# Patient Record
Sex: MALE | Race: WHITE | NOT HISPANIC OR LATINO | Employment: OTHER | URBAN - NONMETROPOLITAN AREA
[De-identification: names, ages, dates, MRNs, and addresses within clinical notes are randomized per-mention and may not be internally consistent; named-entity substitution may affect disease eponyms.]

---

## 2019-03-06 ENCOUNTER — LAB (OUTPATIENT)
Dept: LAB | Facility: HOSPITAL | Age: 64
End: 2019-03-06

## 2019-03-06 ENCOUNTER — OFFICE VISIT (OUTPATIENT)
Dept: ORTHOPEDIC SURGERY | Facility: CLINIC | Age: 64
End: 2019-03-06

## 2019-03-06 VITALS — WEIGHT: 293 LBS | HEIGHT: 65 IN | BODY MASS INDEX: 48.82 KG/M2

## 2019-03-06 DIAGNOSIS — Z96.659 PAINFUL TOTAL KNEE REPLACEMENT, INITIAL ENCOUNTER (HCC): ICD-10-CM

## 2019-03-06 DIAGNOSIS — M00.9 PYOGENIC ARTHRITIS OF RIGHT KNEE JOINT, DUE TO UNSPECIFIED ORGANISM (HCC): ICD-10-CM

## 2019-03-06 DIAGNOSIS — M25.561 RIGHT KNEE PAIN, UNSPECIFIED CHRONICITY: Primary | ICD-10-CM

## 2019-03-06 DIAGNOSIS — T84.84XA PAINFUL TOTAL KNEE REPLACEMENT, INITIAL ENCOUNTER (HCC): ICD-10-CM

## 2019-03-06 DIAGNOSIS — M25.561 RIGHT KNEE PAIN, UNSPECIFIED CHRONICITY: ICD-10-CM

## 2019-03-06 LAB
BASOPHILS # BLD AUTO: 0.01 10*3/MM3 (ref 0–0.2)
BASOPHILS NFR BLD AUTO: 0.1 % (ref 0–1.5)
CRP SERPL-MCNC: 5.3 MG/DL (ref 0–1)
DEPRECATED RDW RBC AUTO: 43.7 FL (ref 37–54)
EOSINOPHIL # BLD AUTO: 0.09 10*3/MM3 (ref 0–0.4)
EOSINOPHIL NFR BLD AUTO: 1.2 % (ref 0.3–6.2)
ERYTHROCYTE [DISTWIDTH] IN BLOOD BY AUTOMATED COUNT: 13.7 % (ref 12.3–15.4)
ERYTHROCYTE [SEDIMENTATION RATE] IN BLOOD: 35 MM/HR (ref 0–15)
HCT VFR BLD AUTO: 43.4 % (ref 37.5–51)
HGB BLD-MCNC: 13.9 G/DL (ref 13–17.7)
IMM GRANULOCYTES # BLD AUTO: 0.03 10*3/MM3 (ref 0–0.05)
IMM GRANULOCYTES NFR BLD AUTO: 0.4 % (ref 0–0.5)
LYMPHOCYTES # BLD AUTO: 1.59 10*3/MM3 (ref 0.7–3.1)
LYMPHOCYTES NFR BLD AUTO: 21.8 % (ref 19.6–45.3)
MCH RBC QN AUTO: 27.9 PG (ref 26.6–33)
MCHC RBC AUTO-ENTMCNC: 32 G/DL (ref 31.5–35.7)
MCV RBC AUTO: 87.1 FL (ref 79–97)
MONOCYTES # BLD AUTO: 0.62 10*3/MM3 (ref 0.1–0.9)
MONOCYTES NFR BLD AUTO: 8.5 % (ref 5–12)
NEUTROPHILS # BLD AUTO: 4.97 10*3/MM3 (ref 1.4–7)
NEUTROPHILS NFR BLD AUTO: 68 % (ref 42.7–76)
NRBC BLD AUTO-RTO: 0 /100 WBC (ref 0–0)
PLATELET # BLD AUTO: 175 10*3/MM3 (ref 140–450)
PMV BLD AUTO: 9.7 FL (ref 6–12)
RBC # BLD AUTO: 4.98 10*6/MM3 (ref 4.14–5.8)
WBC NRBC COR # BLD: 7.31 10*3/MM3 (ref 3.4–10.8)

## 2019-03-06 PROCEDURE — 86140 C-REACTIVE PROTEIN: CPT

## 2019-03-06 PROCEDURE — 87205 SMEAR GRAM STAIN: CPT

## 2019-03-06 PROCEDURE — 20610 DRAIN/INJ JOINT/BURSA W/O US: CPT | Performed by: ORTHOPAEDIC SURGERY

## 2019-03-06 PROCEDURE — 36415 COLL VENOUS BLD VENIPUNCTURE: CPT

## 2019-03-06 PROCEDURE — 85025 COMPLETE CBC W/AUTO DIFF WBC: CPT

## 2019-03-06 PROCEDURE — 87070 CULTURE OTHR SPECIMN AEROBIC: CPT

## 2019-03-06 PROCEDURE — 85651 RBC SED RATE NONAUTOMATED: CPT

## 2019-03-06 PROCEDURE — 99203 OFFICE O/P NEW LOW 30 MIN: CPT | Performed by: ORTHOPAEDIC SURGERY

## 2019-03-06 RX ORDER — SULFAMETHOXAZOLE AND TRIMETHOPRIM 800; 160 MG/1; MG/1
1 TABLET ORAL 2 TIMES DAILY
COMMUNITY
End: 2019-06-07

## 2019-03-06 RX ORDER — BUSPIRONE HYDROCHLORIDE 5 MG/1
5 TABLET ORAL EVERY 12 HOURS SCHEDULED
COMMUNITY
End: 2019-11-01

## 2019-03-06 RX ORDER — AMPICILLIN 500 MG/1
500 CAPSULE ORAL 4 TIMES DAILY
COMMUNITY
End: 2019-06-07

## 2019-03-06 RX ORDER — CARVEDILOL 3.12 MG/1
3.12 TABLET ORAL EVERY 12 HOURS SCHEDULED
COMMUNITY
End: 2019-11-01

## 2019-03-06 RX ORDER — DICLOFENAC SODIUM 75 MG/1
TABLET, DELAYED RELEASE ORAL
COMMUNITY
End: 2019-06-07

## 2019-03-06 RX ORDER — CYCLOBENZAPRINE HCL 10 MG
10 TABLET ORAL 3 TIMES DAILY PRN
Status: ON HOLD | COMMUNITY
End: 2019-08-08

## 2019-03-06 RX ORDER — TRAMADOL HYDROCHLORIDE 50 MG/1
TABLET ORAL
COMMUNITY
End: 2019-06-07

## 2019-03-06 NOTE — PROGRESS NOTES
Michael Sorto is a 63 y.o. male   Primary provider:  Rossy Arias APRN       Chief Complaint   Patient presents with   • Right Knee - Pain       HISTORY OF PRESENT ILLNESS: Patient is here today for right knee pain. Patient brought disc with xrays. Patient states that his knee pain is 5/10. Patient has had infection in his right knee for the last 8 months. He is currently taking antibiotics for the infection. Patient is seeing wound care currently for his knee.   His knee was replaced in 2011 on the right side.  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  Shortly after his initial operation 2011 he developed a problem with his knee He tells me and he had an operation done by Dr. Rubin to repair the kneecap.  At that operation he had an infection and he tells me he did not have components removed was on IV antibiotics to the vein for quite some time.  He tells me that after this he did well until July of last year.  At that time he saw Dr. Frey at [facility].  He tells me shortly after that he got into legal issues and was in penitentiary and was unable to have treatment.  He tells me he was placed on steroid while in penitentiary for treatment.  He saw his doctor in [place] that referred him here for treatment is going to be treated closer to home.  He has not had any current treatment but tells me he is on Augmentin at this point.  According to his chart he is on Bactrim.  He is in a wheelchair now he tells me that he has been walking on it does have a fair amount of pain.  He has not had any recent chills or fever    History of Present Illness     CONCURRENT MEDICAL HISTORY:    No past medical history on file.    No Known Allergies      Current Outpatient Medications:   •  ampicillin (PRINCIPEN) 500 MG capsule, Take 500 mg by mouth 4 (Four) Times a Day., Disp: , Rfl:   •  busPIRone (BUSPAR) 5 MG tablet, Every 12 (Twelve) Hours., Disp: , Rfl:   •  carvedilol (COREG) 3.125 MG  tablet, Every 12 (Twelve) Hours., Disp: , Rfl:   •  cyclobenzaprine (FLEXERIL) 10 MG tablet, Take 10 mg by mouth 3 (Three) Times a Day As Needed for Muscle Spasms., Disp: , Rfl:   •  diclofenac (VOLTAREN) 75 MG EC tablet, diclofenac sodium 75 mg tablet,delayed release  TAKE ONE TABLET BY MOUTH TWICE A DAY, Disp: , Rfl:   •  sulfamethoxazole-trimethoprim (BACTRIM DS,SEPTRA DS) 800-160 MG per tablet, Take 1 tablet by mouth 2 (Two) Times a Day., Disp: , Rfl:   •  traMADol (ULTRAM) 50 MG tablet, tramadol 50 mg tablet  TAKE ONE TABLET BY MOUTH EVERY 6 HOURS AS NEEDED, Disp: , Rfl:     No past surgical history on file.    No family history on file.    Social History     Socioeconomic History   • Marital status: Single     Spouse name: Not on file   • Number of children: Not on file   • Years of education: Not on file   • Highest education level: Not on file   Social Needs   • Financial resource strain: Not on file   • Food insecurity - worry: Not on file   • Food insecurity - inability: Not on file   • Transportation needs - medical: Not on file   • Transportation needs - non-medical: Not on file   Occupational History   • Not on file   Tobacco Use   • Smoking status: Never Smoker   Substance and Sexual Activity   • Alcohol use: Not on file   • Drug use: Not on file   • Sexual activity: Not on file   Other Topics Concern   • Not on file   Social History Narrative   • Not on file        Review of Systems   Constitutional: Positive for activity change. Negative for chills and fever.   HENT: Negative for facial swelling.    Eyes: Negative for photophobia.   Respiratory: Negative for apnea and shortness of breath.    Cardiovascular: Negative for chest pain and leg swelling.   Gastrointestinal: Negative for abdominal pain, nausea and vomiting.   Genitourinary: Negative for dysuria.   Musculoskeletal: Positive for arthralgias, gait problem and joint swelling.   Skin: Negative for color change and rash.   Neurological: Positive  "for weakness. Negative for seizures and syncope.   Psychiatric/Behavioral: Negative for behavioral problems and dysphoric mood.         PHYSICAL EXAMINATION:       Ht 165.1 cm (65\")   Wt 133 kg (293 lb)   BMI 48.76 kg/m²     Physical Exam   Constitutional: He is oriented to person, place, and time. He appears well-developed. No distress.   HENT:   Head: Normocephalic and atraumatic.   Eyes: Right eye exhibits no discharge. Left eye exhibits no discharge.   Neck: Neck supple. No tracheal deviation present.   Pulmonary/Chest: Effort normal.   Musculoskeletal: He exhibits edema, tenderness and deformity.   Neurological: He is alert and oriented to person, place, and time.   Skin: Skin is warm and dry. He is not diaphoretic. No erythema.   Psychiatric: He has a normal mood and affect.       GAIT:     []  Normal  []  Antalgic    Assistive device: []  None  []  Walker     []  Crutches  []  Cane     [x]  Wheelchair  []  Stretcher    Ortho Exam  Below-knee amputation on the left.  Marked swelling with 2 mildly draining sinus tracts over a markedly swollen right knee.  The wound is well-healed.  It is warm there appears to be a little lateral subluxation of the tibia on the femur.  There is mild tenderness here.  Cannot not straighten the knee out past about 40 degrees.  We will flex down to about 90 degrees at the most.  Some venous stasis changes over the calf with 2+ edema.  The calf itself is nontender.  It appears neurovascular intact.    I reviewed x-rays he brings with him of the knee that shows a markedly loosened tibial component where the stem is posteriorly displaced with marked anterior slope where it has been moved there is fracture of the anterior tibial cortex as well as posterior perforation of the stem.  There is marked lucency around it likewise there is lucency around the femoral component.  The AP shows marked destruction radiolucency around the femoral component which is shifted on the femur and there " is subluxation laterally of the tibia on the femur with all prosthetic is loose.  There is a very diminutive patella with some fragmentation I do not see definite component here.    No results found.    Large Joint Arthrocentesis: R knee  Date/Time: 3/6/2019 10:45 AM  Consent given by: patient  Site marked: site marked  Timeout: Immediately prior to procedure a time out was called to verify the correct patient, procedure, equipment, support staff and site/side marked as required   Supporting Documentation  Indications: pain   Procedure Details  Location: knee - R knee  Preparation: Patient was prepped and draped in the usual sterile fashion  Needle size: 22 G  Approach: anteromedial  Medication group details: aspiration only no lidocain or kenalog used.  Aspirate amount: 1 mL  Patient tolerance: patient tolerated the procedure well with no immediate complications          ASSESSMENT:    Diagnoses and all orders for this visit:    Right knee pain, unspecified chronicity  -     CBC & Differential; Future  -     Sedimentation Rate; Future  -     C-reactive Protein; Future  -     Large Joint Arthrocentesis: R knee  -     Cancel: Wound Culture - Wound, Knee, Right; Future  -     Wound Culture - Wound, Knee, Right  -     Wound Culture - Wound, Knee, Right; Future    Pyogenic arthritis of right knee joint, due to unspecified organism (CMS/[facility])    Painful total knee replacement, initial encounter (CMS/[facility])    Other orders  -     traMADol (ULTRAM) 50 MG tablet; tramadol 50 mg tablet   TAKE ONE TABLET BY MOUTH EVERY 6 HOURS AS NEEDED  -     carvedilol (COREG) 3.125 MG tablet; Every 12 (Twelve) Hours.  -     diclofenac (VOLTAREN) 75 MG EC tablet; diclofenac sodium 75 mg tablet,delayed release   TAKE ONE TABLET BY MOUTH TWICE A DAY  -     busPIRone (BUSPAR) 5 MG tablet; Every 12 (Twelve) Hours.  -     cyclobenzaprine (FLEXERIL) 10 MG tablet; Take 10 mg by mouth 3 (Three) Times a Day As Needed for Muscle Spasms.  -      ampicillin (PRINCIPEN) 500 MG capsule; Take 500 mg by mouth 4 (Four) Times a Day.  -     sulfamethoxazole-trimethoprim (BACTRIM DS,SEPTRA DS) 800-160 MG per tablet; Take 1 tablet by mouth 2 (Two) Times a Day.          PLAN spent quite a bit of time talking the patient and family member today.  After which I looked on care everywhere and ran through some of the notes at [facility] from actually the end of June 2018.  He did have an aspiration of 10 cc of fluid and according to the notes of that it did not find a culture it grew a Streptococcus and a enterococcus.  Plans were made at that point to try to treat this.  He was told at that point about the potential for above-knee amputation especially with the history of prior extensor mechanism dysfunction.  The patient says he did well after that.  There is also record on here for the patient has had a stroke in the past and hypertensive disorder as well as depressive disorder.  The patient really denies any medical problems chronically with the exception of a heart issue.  He had a valve replaced which I am not sure of the history but this may have been for infection.  There is also records on his previous chart where he had a cardiac event during surgery which is certainly of some concern.  Attempts today an aspiration in 2 different areas with a 1-1/2 inch 18-gauge needle grew very little fluid.  He has 2 sinus tracts.  He is apparently been in wound management which I think will do nothing for that.  Were going to send him for lab work as above and talk about this again.  We will likely stop his antibiotics prior to that and attempt to get a good organism.  I think this is will be a very difficult procedure that is fraught with multiple complications.  I think in terms of the big operation he probably had to be evaluated by cardiology prior to this.  Off of antibiotics with cultures at the time of surgery with hardware removal cement spacers that a knee  immobilizer.  We will also inspect the extensor mechanism at that point.  I think our goals here would be to get his knee straight, eradicate the infection, and get him back to being a household ambulator.  I think will be very difficult for him to get all of his motion back.  There is a very real possibility that he will end up with an above-knee amputation I have explained to them very carefully today.  We will discuss this further with see him back.  I did send a culture off we will go over his studies.  He had a sed rate of54 and a C-reactive protein level at 84 while at [place] last summer.  It is also apparent from reading the report of the x-rays that that is progressed as well over the past 8 months as there is no mention of the pathologic fractures we certainly saw today's films that were done at [facility].  No Follow-up on file.      This document has been electronically signed by Rufino Lu MD on March 6, 2019 12:24 PM

## 2019-03-18 ENCOUNTER — OFFICE VISIT (OUTPATIENT)
Dept: ORTHOPEDIC SURGERY | Facility: CLINIC | Age: 64
End: 2019-03-18

## 2019-03-18 VITALS — HEIGHT: 65 IN | WEIGHT: 293 LBS | BODY MASS INDEX: 48.82 KG/M2

## 2019-03-18 DIAGNOSIS — Z96.659 PAINFUL TOTAL KNEE REPLACEMENT, INITIAL ENCOUNTER (HCC): Primary | ICD-10-CM

## 2019-03-18 DIAGNOSIS — T84.84XA PAINFUL TOTAL KNEE REPLACEMENT, INITIAL ENCOUNTER (HCC): Primary | ICD-10-CM

## 2019-03-18 PROCEDURE — 99213 OFFICE O/P EST LOW 20 MIN: CPT | Performed by: ORTHOPAEDIC SURGERY

## 2019-03-18 NOTE — PROGRESS NOTES
"Michael Sorto is a 63 y.o. male returns for     Chief Complaint   Patient presents with   • Right Knee - Follow-up       HISTORY OF PRESENT ILLNESS: Patient is here today for recheck of right knee. Patient states that he is not currently having any knee pain today.  Does have swelling and tenderness the culture we did 10 days ago really did not grow anything.  We looked at the old records that grew a enterococcus as well as a strep type germ.  And we got another fax from an outlying source that grew a culture likely from his treating the sinus tracts.       CONCURRENT MEDICAL HISTORY:    No past medical history on file.    No Known Allergies      Current Outpatient Medications:   •  ampicillin (PRINCIPEN) 500 MG capsule, Take 500 mg by mouth 4 (Four) Times a Day., Disp: , Rfl:   •  busPIRone (BUSPAR) 5 MG tablet, Every 12 (Twelve) Hours., Disp: , Rfl:   •  carvedilol (COREG) 3.125 MG tablet, Every 12 (Twelve) Hours., Disp: , Rfl:   •  cyclobenzaprine (FLEXERIL) 10 MG tablet, Take 10 mg by mouth 3 (Three) Times a Day As Needed for Muscle Spasms., Disp: , Rfl:   •  diclofenac (VOLTAREN) 75 MG EC tablet, diclofenac sodium 75 mg tablet,delayed release  TAKE ONE TABLET BY MOUTH TWICE A DAY, Disp: , Rfl:   •  sulfamethoxazole-trimethoprim (BACTRIM DS,SEPTRA DS) 800-160 MG per tablet, Take 1 tablet by mouth 2 (Two) Times a Day., Disp: , Rfl:   •  traMADol (ULTRAM) 50 MG tablet, tramadol 50 mg tablet  TAKE ONE TABLET BY MOUTH EVERY 6 HOURS AS NEEDED, Disp: , Rfl:     No past surgical history on file.        ROS: Review of systems has been updated as of today's date.  All other systems are negative except as noted previously.    PHYSICAL EXAMINATION:       Ht 165.1 cm (65\")   Wt 133 kg (293 lb)   BMI 48.76 kg/m²     Physical Exam   Constitutional: He is oriented to person, place, and time. He appears well-developed.   HENT:   Head: Normocephalic and atraumatic.   Eyes: EOM are normal. Pupils are equal, round, and reactive " to light.   Neck: Neck supple. No tracheal deviation present.   Pulmonary/Chest: Effort normal.   Musculoskeletal: Normal range of motion. He exhibits no edema or deformity.   Neurological: He is alert and oriented to person, place, and time.   Skin: Skin is warm and dry. No erythema.   Psychiatric: He has a normal mood and affect.       GAIT:     [x]  Normal  []  Antalgic    Assistive device: []  None  []  Walker     []  Crutches  []  Cane     []  Wheelchair  []  Stretcher    Ortho Exam  Range of motion is unchanged.  The knee is swollen wounds as before.  Neurologically unchanged.    No results found.          ASSESSMENT:    Diagnoses and all orders for this visit:    Painful total knee replacement, initial encounter (CMS/[facility])          PLAN I spent about 15-20 minutes talking with the patient his family regarding his options.  In reading through the notes he did apparently code during surgery.  He says this was because of his valve and that was replaced but has not seen a cardiologist in a while.  He will need to see the cardiology for medical clearance from a Cardiologic standpoint.  Also see Rossy Arias who takes care of his medical issues.  He will need to be cleared by that he has a very large road ahead in terms of options.  I think phase 1 will be removal of the hardware followed by cultures and a cement spacer we will keep him off antibiotics.  He has the issue is extensor mechanism and certainly 1 of the things I discussed with him today just likely did not Zak is above-knee amputation.  Certainly that is an option at this point as well as a potential outcome that may happen regardless of this treatment.  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  Given that the benefit of the doubt we can remove culture put him on IV antibiotics.  This may require nursing home placement.   After that replant certainly may fail and worried about his extensor mechanism.  Certainly agree with the scan as well as other things.  Very real risks and benefits of this operation including but not limited to bleeding, blood clot, amputation, recurrent infection, fracture which she already has, need for further surgery which will be a necessity, medical anesthetic complications, neurovascular injury, etc. this is been discussed very frankly with him he wants to proceed his plan which is very risky operation for a orthopedic perspective which is complicated by his multiple comorbidities  appt with Rossy Arias on 3/25/2019 @ 10:15am and Dr. Nguyễn on 3/28/2019 @ 1:20pm for surgery clearance.       No Follow-up on file.      This document has been electronically signed by Rufino Lu MD on March 18, 2019 10:42 AM

## 2019-03-20 LAB
BACTERIA SPEC AEROBE CULT: NORMAL
GRAM STN SPEC: NORMAL
GRAM STN SPEC: NORMAL

## 2019-03-26 ENCOUNTER — TELEPHONE (OUTPATIENT)
Dept: ORTHOPEDIC SURGERY | Facility: CLINIC | Age: 64
End: 2019-03-26

## 2019-03-26 NOTE — TELEPHONE ENCOUNTER
DR BARBA.  THIS PATIENT HAS AN APPOINTMENT FOR CARDIAC CLEARANCE FOR SURGERY 04/30/19 WITH [facility]..  THEY ARE ASKING IF YOU CAN SEND AN ORDER FOR AN ECHO COMPLETE?  HE HAS HAD AN AORTIC VALVE REPLACEMENT AND THEY NEED TO DO THE ECHO BEFORE THE APPOINTMENT TO CHECK ON THE CLEARANCE.    PHONE # [phone]  FAX # [phone]

## 2019-04-01 ENCOUNTER — TELEPHONE (OUTPATIENT)
Dept: ORTHOPEDIC SURGERY | Facility: CLINIC | Age: 64
End: 2019-04-01

## 2019-04-01 NOTE — TELEPHONE ENCOUNTER
We will call them and let them know that this is not something we order and that they will have to place the order.

## 2019-04-11 ENCOUNTER — OUTSIDE FACILITY SERVICE (OUTPATIENT)
Dept: CARDIOLOGY | Facility: CLINIC | Age: 64
End: 2019-04-11

## 2019-04-11 PROCEDURE — 93306 TTE W/DOPPLER COMPLETE: CPT | Performed by: INTERNAL MEDICINE

## 2019-05-10 ENCOUNTER — TELEPHONE (OUTPATIENT)
Dept: ORTHOPEDIC SURGERY | Facility: CLINIC | Age: 64
End: 2019-05-10

## 2019-05-10 ENCOUNTER — PREP FOR SURGERY (OUTPATIENT)
Dept: OTHER | Facility: HOSPITAL | Age: 64
End: 2019-05-10

## 2019-05-10 DIAGNOSIS — M00.9 PYOGENIC ARTHRITIS OF RIGHT KNEE JOINT, DUE TO UNSPECIFIED ORGANISM (HCC): Primary | ICD-10-CM

## 2019-05-10 RX ORDER — BUPIVACAINE HCL/0.9 % NACL/PF 0.1 %
2 PLASTIC BAG, INJECTION (ML) EPIDURAL ONCE
Status: CANCELLED | OUTPATIENT
Start: 2019-06-18 | End: 2019-05-10

## 2019-05-10 NOTE — TELEPHONE ENCOUNTER
Called patient and let him know once the surgery is approved we will contact him to get it scheduled.

## 2019-05-13 ENCOUNTER — TELEPHONE (OUTPATIENT)
Dept: ORTHOPEDIC SURGERY | Facility: CLINIC | Age: 64
End: 2019-05-13

## 2019-06-07 ENCOUNTER — APPOINTMENT (OUTPATIENT)
Dept: PREADMISSION TESTING | Facility: HOSPITAL | Age: 64
End: 2019-06-07

## 2019-06-07 ENCOUNTER — OFFICE VISIT (OUTPATIENT)
Dept: ORTHOPEDIC SURGERY | Facility: CLINIC | Age: 64
End: 2019-06-07

## 2019-06-07 ENCOUNTER — TELEPHONE (OUTPATIENT)
Dept: ORTHOPEDIC SURGERY | Facility: CLINIC | Age: 64
End: 2019-06-07

## 2019-06-07 VITALS — WEIGHT: 295 LBS | BODY MASS INDEX: 49.15 KG/M2 | HEIGHT: 65 IN

## 2019-06-07 VITALS
WEIGHT: 295 LBS | DIASTOLIC BLOOD PRESSURE: 99 MMHG | HEART RATE: 73 BPM | SYSTOLIC BLOOD PRESSURE: 170 MMHG | OXYGEN SATURATION: 98 % | HEIGHT: 65 IN | BODY MASS INDEX: 49.15 KG/M2 | RESPIRATION RATE: 16 BRPM

## 2019-06-07 DIAGNOSIS — G89.29 CHRONIC PAIN OF RIGHT KNEE: Primary | ICD-10-CM

## 2019-06-07 DIAGNOSIS — M00.9 PYOGENIC ARTHRITIS OF RIGHT KNEE JOINT, DUE TO UNSPECIFIED ORGANISM (HCC): ICD-10-CM

## 2019-06-07 DIAGNOSIS — M25.561 CHRONIC PAIN OF RIGHT KNEE: Primary | ICD-10-CM

## 2019-06-07 DIAGNOSIS — Z96.659 PAINFUL TOTAL KNEE REPLACEMENT, INITIAL ENCOUNTER (HCC): ICD-10-CM

## 2019-06-07 DIAGNOSIS — T84.84XA PAINFUL TOTAL KNEE REPLACEMENT, INITIAL ENCOUNTER (HCC): ICD-10-CM

## 2019-06-07 LAB
ABO GROUP BLD: NORMAL
BLD GP AB SCN SERPL QL: NEGATIVE
DEPRECATED RDW RBC AUTO: 41.6 FL (ref 37–54)
ERYTHROCYTE [DISTWIDTH] IN BLOOD BY AUTOMATED COUNT: 13.2 % (ref 12.3–15.4)
HCT VFR BLD AUTO: 44.1 % (ref 37.5–51)
HGB BLD-MCNC: 14.3 G/DL (ref 13–17.7)
Lab: NORMAL
MCH RBC QN AUTO: 28.1 PG (ref 26.6–33)
MCHC RBC AUTO-ENTMCNC: 32.4 G/DL (ref 31.5–35.7)
MCV RBC AUTO: 86.8 FL (ref 79–97)
PLATELET # BLD AUTO: 143 10*3/MM3 (ref 140–450)
PMV BLD AUTO: 9.5 FL (ref 6–12)
RBC # BLD AUTO: 5.08 10*6/MM3 (ref 4.14–5.8)
RH BLD: POSITIVE
T&S EXPIRATION DATE: NORMAL
WBC NRBC COR # BLD: 6.78 10*3/MM3 (ref 3.4–10.8)

## 2019-06-07 PROCEDURE — 86850 RBC ANTIBODY SCREEN: CPT | Performed by: ANESTHESIOLOGY

## 2019-06-07 PROCEDURE — 36415 COLL VENOUS BLD VENIPUNCTURE: CPT

## 2019-06-07 PROCEDURE — 85027 COMPLETE CBC AUTOMATED: CPT | Performed by: ANESTHESIOLOGY

## 2019-06-07 PROCEDURE — 86901 BLOOD TYPING SEROLOGIC RH(D): CPT | Performed by: ANESTHESIOLOGY

## 2019-06-07 PROCEDURE — 93010 ELECTROCARDIOGRAM REPORT: CPT | Performed by: INTERNAL MEDICINE

## 2019-06-07 PROCEDURE — 86900 BLOOD TYPING SEROLOGIC ABO: CPT | Performed by: ANESTHESIOLOGY

## 2019-06-07 PROCEDURE — 99214 OFFICE O/P EST MOD 30 MIN: CPT | Performed by: NURSE PRACTITIONER

## 2019-06-07 PROCEDURE — 93005 ELECTROCARDIOGRAM TRACING: CPT

## 2019-06-07 RX ORDER — TRAMADOL HYDROCHLORIDE 50 MG/1
50 TABLET ORAL EVERY 6 HOURS PRN
COMMUNITY

## 2019-06-07 RX ORDER — SODIUM CHLORIDE, SODIUM GLUCONATE, SODIUM ACETATE, POTASSIUM CHLORIDE, AND MAGNESIUM CHLORIDE 526; 502; 368; 37; 30 MG/100ML; MG/100ML; MG/100ML; MG/100ML; MG/100ML
1000 INJECTION, SOLUTION INTRAVENOUS CONTINUOUS
Status: CANCELLED | OUTPATIENT
Start: 2019-06-18

## 2019-06-07 RX ORDER — DICLOFENAC SODIUM 75 MG/1
75 TABLET, DELAYED RELEASE ORAL 2 TIMES DAILY
COMMUNITY
End: 2019-08-10 | Stop reason: HOSPADM

## 2019-06-07 NOTE — PROGRESS NOTES
"Michael Sorto is a 64 y.o. male returns for     Chief Complaint   Patient presents with   • Right Knee - Pre-op Exam       HISTORY OF PRESENT ILLNESS: Patient presents to office for follow-up of chronic right knee pain and preoperative exam.  Patient is scheduled for removal of his right knee implant with insertion of spacer and antibiotic cement per Dr. Lu on 6/18/2019.  Since last office visit, patient has been evaluated by his cardiologist, Dr. Danielle Nguyễn, with [facility] on 4/30/2019 and cleared for surgery.  Patient also had a new echocardiogram performed at that time.  Patient is wearing his brace to the right knee for added support.  Patient has a history of left BKA and is wearing his left leg prosthesis.  Patient states his mobility has been quite limited in the past year and he does not walk much due to the pain in his right knee.  Patient is ready to proceed with surgical treatment as planned and previously discussed in detail with Dr. Lu in an effort to improve his chronic right knee pain, mobility and overall quality of life.     CONCURRENT MEDICAL HISTORY:    The following portions of the patient's history were reviewed and updated as appropriate: allergies, current medications, past family history, past medical history, past social history, past surgical history and problem list.     ROS  No fevers or chills.  No chest pain or shortness of air.  No GI or  disturbances. Right knee pain.     PHYSICAL EXAMINATION:       Ht 165.1 cm (65\")   Wt 134 kg (295 lb)   BMI 49.09 kg/m²     Physical Exam   Constitutional: He is oriented to person, place, and time. Vital signs are normal. He appears well-developed and well-nourished. He is cooperative. He does not appear ill. No distress.   HENT:   Head: Normocephalic.   Cardiovascular: Regular rhythm and normal heart sounds.   Pulmonary/Chest: Effort normal and breath sounds normal. No respiratory distress.   Abdominal: Soft. " He exhibits no distension.   Musculoskeletal: He exhibits edema (Right knee), tenderness (Right knee) and deformity (Left BKA (chronic)).        Right knee: He exhibits effusion.   Neurological: He is alert and oriented to person, place, and time. GCS eye subscore is 4. GCS verbal subscore is 5. GCS motor subscore is 6.   Skin: Skin is warm, dry and intact. Capillary refill takes less than 2 seconds. No erythema.   Psychiatric: He has a normal mood and affect. His speech is normal and behavior is normal. Judgment and thought content normal. Cognition and memory are normal.   Vitals reviewed.      GAIT:     []  Normal  []  Antalgic    Assistive device: []  None  []  Walker     []  Crutches  []  Cane     [x]  Wheelchair  []  Stretcher    Right Knee Exam     Tenderness   Right knee tenderness location: Diffuse.    Range of Motion   Extension: 20   Flexion: 90     Other   Erythema: absent  Scars: present (well-healed surgical scars to anterior knee)  Sensation: normal  Pulse: present  Swelling: moderate  Effusion: effusion present    Comments:  Pain and significant limitations with range of motion.  Very limited extension of the knee joint.  Patient is able to flex the knee to about 90 degrees.  Patient has some well-healed surgical scars overlying the anterior knee.  There are 2 chronic wounds noted that appear to be sinus tracts.  No active drainage at this time.  No purulence.  No surrounding erythema.  No signs of cellulitis.  Patient states the wound is drain intermittently.  Moderate swelling/effusion present.  Moderate, +2 edema noted in the lower leg, which appears to be chronic.  There are chronic vascular changes noted in the lower leg also.  Calf is soft and nontender.      Left Knee Exam     Other   Erythema: absent    Comments:  Left BKA with prosthesis in place.               ASSESSMENT:    Diagnoses and all orders for this visit:    Chronic pain of right knee    Painful total knee replacement, initial  encounter (CMS/[facility])    Pyogenic arthritis of right knee joint, due to unspecified organism (CMS/[facility])    PLAN    The patient is here today for updated history and physical and preoperative examination for his upcoming knee surgery with Dr. Lu.  He has no new complaints or concerns noted.  He denies any change in his medical conditions since last office visit.  No recent illness.  Patient has been evaluated by his cardiologist and had a new echocardiogram since his last office visit. He has been cleared for surgery.  Copies of the the cardiology note and echo report are scanned into the chart.  The patient is high risk for surgery and decision making is more complex due to his multiple comorbid medical conditions including previous aortic valve replacement done in 2011, hypertension and previous CVA.  The patient also has an ankle monitor in place to the right lower extremity (ankle) as he is on house arrest.  He is instructed that he will need this moved to a different location on his body prior to his surgery.  Patient states he plans to call and have this taken care of.  Patient is also instructed to stop his Voltaren 5 days prior to surgery.  Patient verbalized understanding.    The patient voiced understanding of the risks, benefits, and alternative forms of treatment that were discussed and the patient consents to proceed with surgery.  All risks, benefits and alternatives were discussed.  Risks including but not exclusive to anesthetic complications, including death, MI, CVA, infection, bleeding DVT, fracture, residual pain and need for future surgery.    This discussion was previously held with the patient by Rufino Lu MD and all questions were answered.  The patient expresses no new questions or concerns today and is ready to proceed.    Scheduled for removal of right knee implant with insertion of spacer and antibiotic cement per Dr. Lu on 6/18/2019.    Return for follow up  postoperatively.        This document has been electronically signed by BRAN Lu on June 11, 2019 12:50 PM      BRAN Lu

## 2019-06-07 NOTE — DISCHARGE INSTRUCTIONS
[facility]  Pre-op Information and Guidelines    You will be called after 2 p.m. the day before your surgery (Friday for Monday surgery) and notified of your time for arrival and approximate surgery time.  If you have not received a call by 4P.M., please contact Same Day Surgery at [phone] of if outside [facility] call [phone].    Please Follow these Important Safety Guidelines:    • The morning of your procedure, take only the medications listed below with   A sip of water:_____________________________________________       __BUSPAR, CARVEDILOL, TRAMADOL, ROLAID___    • DO NOT eat or drink anything after 12:00 midnight the night before surgery  Specific instructions concerning drinking clear liquids will be discussed during  the pre-surgery instruction call the day before your surgery.    • If you take a blood thinner (ex. Plavix, Coumadin, aspirin), ask your doctor when to stop it before surgery  STOP DATE: _________________    • Only 2 visitors are allowed in patient rooms at a time  Your visitors will be asked to wait in the lobby until the admission process is complete with the exception of a parent with a child and patients in need of special assistance.    • YOU CANNOT DRIVE YOURSELF HOME  You must be accompanied by someone who will be responsible for driving you home after surgery and for your care at home.    • DO NOT chew gum, use breath mints, hard candy, or smoke the day of surgery  • DO NOT drink alcohol for at least 24 hours before your surgery  • DO NOT wear any jewelry and remove all body piercing before coming to the hospital  • DO NOT wear make-up to the hospital  • If you are having surgery on an extremity (arm/leg/foot) remove nail polish/artificial nails on the surgical side  • Clothing, glasses, contacts, dentures, and hairpieces must be removed before surgery  • Bathe the night before or the morning of your surgery and do not use powders/lotions on  skin.

## 2019-06-07 NOTE — TELEPHONE ENCOUNTER
Need to speak with patient in regards to surgery and his ankle monitor that needs to be removed before surgery.

## 2019-06-17 ENCOUNTER — ANESTHESIA EVENT (OUTPATIENT)
Dept: PERIOP | Facility: HOSPITAL | Age: 64
End: 2019-06-17

## 2019-06-17 DIAGNOSIS — M25.561 RIGHT KNEE PAIN, UNSPECIFIED CHRONICITY: ICD-10-CM

## 2019-06-17 DIAGNOSIS — Z96.659 PAINFUL TOTAL KNEE REPLACEMENT, INITIAL ENCOUNTER (HCC): ICD-10-CM

## 2019-06-17 DIAGNOSIS — T84.84XA PAINFUL TOTAL KNEE REPLACEMENT, INITIAL ENCOUNTER (HCC): ICD-10-CM

## 2019-06-17 DIAGNOSIS — M25.561 CHRONIC PAIN OF RIGHT KNEE: Primary | ICD-10-CM

## 2019-06-17 DIAGNOSIS — G89.29 CHRONIC PAIN OF RIGHT KNEE: Primary | ICD-10-CM

## 2019-06-17 DIAGNOSIS — M00.9 PYOGENIC ARTHRITIS OF RIGHT KNEE JOINT, DUE TO UNSPECIFIED ORGANISM (HCC): ICD-10-CM

## 2019-06-18 ENCOUNTER — HOSPITAL ENCOUNTER (INPATIENT)
Facility: HOSPITAL | Age: 64
LOS: 10 days | Discharge: NURSING, REHAB OR LONG-TERM CARE | End: 2019-06-28
Attending: ORTHOPAEDIC SURGERY | Admitting: ORTHOPAEDIC SURGERY

## 2019-06-18 ENCOUNTER — APPOINTMENT (OUTPATIENT)
Dept: GENERAL RADIOLOGY | Facility: HOSPITAL | Age: 64
End: 2019-06-18

## 2019-06-18 ENCOUNTER — ANESTHESIA (OUTPATIENT)
Dept: PERIOP | Facility: HOSPITAL | Age: 64
End: 2019-06-18

## 2019-06-18 DIAGNOSIS — Z78.9 IMPAIRED MOBILITY AND ADLS: ICD-10-CM

## 2019-06-18 DIAGNOSIS — M00.9 PYOGENIC ARTHRITIS OF RIGHT KNEE JOINT, DUE TO UNSPECIFIED ORGANISM (HCC): ICD-10-CM

## 2019-06-18 DIAGNOSIS — Z74.09 IMPAIRED PHYSICAL MOBILITY: ICD-10-CM

## 2019-06-18 DIAGNOSIS — Z74.09 IMPAIRED MOBILITY AND ADLS: ICD-10-CM

## 2019-06-18 PROCEDURE — 87186 SC STD MICRODIL/AGAR DIL: CPT | Performed by: ORTHOPAEDIC SURGERY

## 2019-06-18 PROCEDURE — 25010000002 PROPOFOL 10 MG/ML EMULSION: Performed by: NURSE ANESTHETIST, CERTIFIED REGISTERED

## 2019-06-18 PROCEDURE — 88305 TISSUE EXAM BY PATHOLOGIST: CPT | Performed by: PATHOLOGY

## 2019-06-18 PROCEDURE — 25010000002 ONDANSETRON PER 1 MG: Performed by: NURSE ANESTHETIST, CERTIFIED REGISTERED

## 2019-06-18 PROCEDURE — 88304 TISSUE EXAM BY PATHOLOGIST: CPT | Performed by: ORTHOPAEDIC SURGERY

## 2019-06-18 PROCEDURE — 88311 DECALCIFY TISSUE: CPT | Performed by: ORTHOPAEDIC SURGERY

## 2019-06-18 PROCEDURE — 87077 CULTURE AEROBIC IDENTIFY: CPT | Performed by: ORTHOPAEDIC SURGERY

## 2019-06-18 PROCEDURE — 0SRC0EZ REPLACEMENT OF RIGHT KNEE JOINT WITH ARTICULATING SPACER, OPEN APPROACH: ICD-10-PCS | Performed by: ORTHOPAEDIC SURGERY

## 2019-06-18 PROCEDURE — 73560 X-RAY EXAM OF KNEE 1 OR 2: CPT

## 2019-06-18 PROCEDURE — 25010000002 VANCOMYCIN 1 G RECONSTITUTED SOLUTION: Performed by: ORTHOPAEDIC SURGERY

## 2019-06-18 PROCEDURE — A9270 NON-COVERED ITEM OR SERVICE: HCPCS | Performed by: ORTHOPAEDIC SURGERY

## 2019-06-18 PROCEDURE — 25010000002 FENTANYL CITRATE (PF) 100 MCG/2ML SOLUTION: Performed by: NURSE ANESTHETIST, CERTIFIED REGISTERED

## 2019-06-18 PROCEDURE — 88300 SURGICAL PATH GROSS: CPT | Performed by: ORTHOPAEDIC SURGERY

## 2019-06-18 PROCEDURE — 87070 CULTURE OTHR SPECIMN AEROBIC: CPT | Performed by: ORTHOPAEDIC SURGERY

## 2019-06-18 PROCEDURE — 63710000001 OXYCODONE 10 MG TABLET EXTENDED-RELEASE 12 HOUR: Performed by: ORTHOPAEDIC SURGERY

## 2019-06-18 PROCEDURE — 88305 TISSUE EXAM BY PATHOLOGIST: CPT | Performed by: ORTHOPAEDIC SURGERY

## 2019-06-18 PROCEDURE — 87176 TISSUE HOMOGENIZATION CULTR: CPT | Performed by: ORTHOPAEDIC SURGERY

## 2019-06-18 PROCEDURE — 25010000002 CEFAZOLIN PER 500 MG: Performed by: ORTHOPAEDIC SURGERY

## 2019-06-18 PROCEDURE — 63710000001 SENNA-DOCUSATE 8.6-50 MG TABLET: Performed by: ORTHOPAEDIC SURGERY

## 2019-06-18 PROCEDURE — 94760 N-INVAS EAR/PLS OXIMETRY 1: CPT

## 2019-06-18 PROCEDURE — 87205 SMEAR GRAM STAIN: CPT | Performed by: ORTHOPAEDIC SURGERY

## 2019-06-18 PROCEDURE — 63710000001 BUSPIRONE 5 MG TABLET: Performed by: ORTHOPAEDIC SURGERY

## 2019-06-18 PROCEDURE — 25010000002 SUCCINYLCHOLINE PER 20 MG: Performed by: NURSE ANESTHETIST, CERTIFIED REGISTERED

## 2019-06-18 PROCEDURE — 25010000002 HYDROMORPHONE 1 MG/ML SOLUTION: Performed by: NURSE ANESTHETIST, CERTIFIED REGISTERED

## 2019-06-18 PROCEDURE — 25010000002 MIDAZOLAM PER 1 MG: Performed by: NURSE ANESTHETIST, CERTIFIED REGISTERED

## 2019-06-18 PROCEDURE — C1713 ANCHOR/SCREW BN/BN,TIS/BN: HCPCS | Performed by: ORTHOPAEDIC SURGERY

## 2019-06-18 PROCEDURE — 94799 UNLISTED PULMONARY SVC/PX: CPT

## 2019-06-18 PROCEDURE — 25010000002 NEOSTIGMINE 4 MG/4ML SOLUTION PREFILLED SYRINGE: Performed by: NURSE ANESTHETIST, CERTIFIED REGISTERED

## 2019-06-18 PROCEDURE — 25010000002 PHENYLEPHRINE PER 1 ML: Performed by: NURSE ANESTHETIST, CERTIFIED REGISTERED

## 2019-06-18 PROCEDURE — 63710000001 FAMOTIDINE 40 MG TABLET: Performed by: ORTHOPAEDIC SURGERY

## 2019-06-18 PROCEDURE — 0SPC0JZ REMOVAL OF SYNTHETIC SUBSTITUTE FROM RIGHT KNEE JOINT, OPEN APPROACH: ICD-10-PCS | Performed by: ORTHOPAEDIC SURGERY

## 2019-06-18 PROCEDURE — 63710000001 CARVEDILOL 3.125 MG TABLET: Performed by: ORTHOPAEDIC SURGERY

## 2019-06-18 PROCEDURE — C1776 JOINT DEVICE (IMPLANTABLE): HCPCS | Performed by: ORTHOPAEDIC SURGERY

## 2019-06-18 PROCEDURE — 88311 DECALCIFY TISSUE: CPT | Performed by: PATHOLOGY

## 2019-06-18 PROCEDURE — 27488 REMOVAL OF KNEE PROSTHESIS: CPT | Performed by: ORTHOPAEDIC SURGERY

## 2019-06-18 PROCEDURE — 25010000002 DEXAMETHASONE PER 1 MG: Performed by: NURSE ANESTHETIST, CERTIFIED REGISTERED

## 2019-06-18 PROCEDURE — 63710000001 ACETAMINOPHEN 500 MG TABLET: Performed by: ORTHOPAEDIC SURGERY

## 2019-06-18 PROCEDURE — 25010000002 GENTAMICIN PER 80 MG: Performed by: ORTHOPAEDIC SURGERY

## 2019-06-18 DEVICE — MEDIUM 44A/P X 67M/L FEMORAL SPACER MOLD
Type: IMPLANTABLE DEVICE | Site: KNEE | Status: FUNCTIONAL
Brand: KASM

## 2019-06-18 DEVICE — MEDIUM 45A/P X 70M/L TIBIAL SPACER MOLD
Type: IMPLANTABLE DEVICE | Site: KNEE | Status: FUNCTIONAL
Brand: KASM

## 2019-06-18 DEVICE — SMARTSET GMV HIGH PERFORMANCE GENTAMICIN MEDIUM VISCOSITY BONE CEMENT 40G
Type: IMPLANTABLE DEVICE | Site: KNEE | Status: FUNCTIONAL
Brand: SMARTSET

## 2019-06-18 DEVICE — STIMULAN® RAPID CURE PROVIDED STERILE FOR SINGLE PATIENT USE. STIMULAN® RAPID CURE CONTAINS CALCIUM SULFATE POWDER AND MIXING SOLUTION IN PRE-MEASURED QUANTITIES SO THAT WHEN MIXED TOGETHER IN A STERILE MIXING BOWL, THE RESULTANT PASTE IS TO BE DIGITALLY PACKED INTO OPEN BONE VOID/GAP TO SET INSITU OR PLACED INTO THE MOULD PROVIDED, THE MIXTURE SETS TO FORM BEADS. THE BIODEGRADABLE, RADIOPAQUE BEADS ARE RESORBED IN APPROXIMATELY 30 – 60 DAYS WHEN USED IN ACCORDANCE WITH THE DEVICE LABELLING. STIMULAN® RAPID CURE IS MANUFACTURED FROM SYNTHETIC IMPLANT GRADE CALCIUM SULFATE DIHYDRATE(CASO4.2H2O) THAT RESORBS AND IS REPLACED WITH BONE DURING THE HEALING PROCESS. ALSO, AS THE BONE VOID FILLER BEADS ARE BIODEGRADABLE AND BIOCOMPATIBLE, THEY MAY BE USED AT AN INFECTED SITE.
Type: IMPLANTABLE DEVICE | Site: KNEE | Status: FUNCTIONAL
Brand: STIMULAN® RAPID CURE

## 2019-06-18 RX ORDER — 0.9 % SODIUM CHLORIDE 0.9 %
VIAL (ML) INJECTION AS NEEDED
Status: DISCONTINUED | OUTPATIENT
Start: 2019-06-18 | End: 2019-06-28 | Stop reason: HOSPADM

## 2019-06-18 RX ORDER — SODIUM CHLORIDE 9 MG/ML
75 INJECTION, SOLUTION INTRAVENOUS CONTINUOUS
Status: DISCONTINUED | OUTPATIENT
Start: 2019-06-18 | End: 2019-06-28 | Stop reason: HOSPADM

## 2019-06-18 RX ORDER — OXYCODONE HCL 10 MG/1
10 TABLET, FILM COATED, EXTENDED RELEASE ORAL EVERY 12 HOURS SCHEDULED
Status: COMPLETED | OUTPATIENT
Start: 2019-06-18 | End: 2019-06-23

## 2019-06-18 RX ORDER — ONDANSETRON 2 MG/ML
4 INJECTION INTRAMUSCULAR; INTRAVENOUS ONCE AS NEEDED
Status: DISCONTINUED | OUTPATIENT
Start: 2019-06-18 | End: 2019-06-18 | Stop reason: HOSPADM

## 2019-06-18 RX ORDER — BUPIVACAINE HCL/0.9 % NACL/PF 0.1 %
2 PLASTIC BAG, INJECTION (ML) EPIDURAL ONCE
Status: COMPLETED | OUTPATIENT
Start: 2019-06-18 | End: 2019-06-18

## 2019-06-18 RX ORDER — ONDANSETRON 4 MG/1
4 TABLET, FILM COATED ORAL EVERY 6 HOURS PRN
Status: DISCONTINUED | OUTPATIENT
Start: 2019-06-18 | End: 2019-06-28 | Stop reason: HOSPADM

## 2019-06-18 RX ORDER — BUSPIRONE HYDROCHLORIDE 5 MG/1
5 TABLET ORAL EVERY 12 HOURS SCHEDULED
Status: DISCONTINUED | OUTPATIENT
Start: 2019-06-18 | End: 2019-06-28 | Stop reason: HOSPADM

## 2019-06-18 RX ORDER — SODIUM CHLORIDE 0.9 % (FLUSH) 0.9 %
3-10 SYRINGE (ML) INJECTION AS NEEDED
Status: DISCONTINUED | OUTPATIENT
Start: 2019-06-18 | End: 2019-06-28 | Stop reason: HOSPADM

## 2019-06-18 RX ORDER — ACETAMINOPHEN 500 MG
1000 TABLET ORAL 4 TIMES DAILY
Status: DISCONTINUED | OUTPATIENT
Start: 2019-06-18 | End: 2019-06-28 | Stop reason: HOSPADM

## 2019-06-18 RX ORDER — PROPOFOL 10 MG/ML
VIAL (ML) INTRAVENOUS AS NEEDED
Status: DISCONTINUED | OUTPATIENT
Start: 2019-06-18 | End: 2019-06-18 | Stop reason: SURG

## 2019-06-18 RX ORDER — PROMETHAZINE HYDROCHLORIDE 25 MG/1
25 TABLET ORAL ONCE AS NEEDED
Status: DISCONTINUED | OUTPATIENT
Start: 2019-06-18 | End: 2019-06-18 | Stop reason: HOSPADM

## 2019-06-18 RX ORDER — SENNA AND DOCUSATE SODIUM 50; 8.6 MG/1; MG/1
2 TABLET, FILM COATED ORAL NIGHTLY
Status: DISCONTINUED | OUTPATIENT
Start: 2019-06-18 | End: 2019-06-28 | Stop reason: HOSPADM

## 2019-06-18 RX ORDER — EPHEDRINE SULFATE 50 MG/ML
INJECTION, SOLUTION INTRAVENOUS AS NEEDED
Status: DISCONTINUED | OUTPATIENT
Start: 2019-06-18 | End: 2019-06-18 | Stop reason: SURG

## 2019-06-18 RX ORDER — PROMETHAZINE HYDROCHLORIDE 25 MG/ML
12.5 INJECTION, SOLUTION INTRAMUSCULAR; INTRAVENOUS ONCE AS NEEDED
Status: DISCONTINUED | OUTPATIENT
Start: 2019-06-18 | End: 2019-06-18 | Stop reason: HOSPADM

## 2019-06-18 RX ORDER — ONDANSETRON 2 MG/ML
INJECTION INTRAMUSCULAR; INTRAVENOUS AS NEEDED
Status: DISCONTINUED | OUTPATIENT
Start: 2019-06-18 | End: 2019-06-18 | Stop reason: SURG

## 2019-06-18 RX ORDER — DEXAMETHASONE SODIUM PHOSPHATE 4 MG/ML
INJECTION, SOLUTION INTRA-ARTICULAR; INTRALESIONAL; INTRAMUSCULAR; INTRAVENOUS; SOFT TISSUE AS NEEDED
Status: DISCONTINUED | OUTPATIENT
Start: 2019-06-18 | End: 2019-06-18 | Stop reason: SURG

## 2019-06-18 RX ORDER — ACETAMINOPHEN 325 MG/1
650 TABLET ORAL ONCE AS NEEDED
Status: DISCONTINUED | OUTPATIENT
Start: 2019-06-18 | End: 2019-06-18 | Stop reason: HOSPADM

## 2019-06-18 RX ORDER — FENTANYL CITRATE 50 UG/ML
INJECTION, SOLUTION INTRAMUSCULAR; INTRAVENOUS AS NEEDED
Status: DISCONTINUED | OUTPATIENT
Start: 2019-06-18 | End: 2019-06-18 | Stop reason: SURG

## 2019-06-18 RX ORDER — NALOXONE HCL 0.4 MG/ML
0.1 VIAL (ML) INJECTION
Status: DISCONTINUED | OUTPATIENT
Start: 2019-06-18 | End: 2019-06-22

## 2019-06-18 RX ORDER — VANCOMYCIN HYDROCHLORIDE 1 G/20ML
INJECTION, POWDER, LYOPHILIZED, FOR SOLUTION INTRAVENOUS AS NEEDED
Status: DISCONTINUED | OUTPATIENT
Start: 2019-06-18 | End: 2019-06-28 | Stop reason: HOSPADM

## 2019-06-18 RX ORDER — ROCURONIUM BROMIDE 10 MG/ML
INJECTION, SOLUTION INTRAVENOUS AS NEEDED
Status: DISCONTINUED | OUTPATIENT
Start: 2019-06-18 | End: 2019-06-18 | Stop reason: SURG

## 2019-06-18 RX ORDER — BUPIVACAINE HCL/0.9 % NACL/PF 0.1 %
2 PLASTIC BAG, INJECTION (ML) EPIDURAL EVERY 8 HOURS
Status: COMPLETED | OUTPATIENT
Start: 2019-06-18 | End: 2019-06-19

## 2019-06-18 RX ORDER — SODIUM CHLORIDE, SODIUM GLUCONATE, SODIUM ACETATE, POTASSIUM CHLORIDE, AND MAGNESIUM CHLORIDE 526; 502; 368; 37; 30 MG/100ML; MG/100ML; MG/100ML; MG/100ML; MG/100ML
1000 INJECTION, SOLUTION INTRAVENOUS CONTINUOUS
Status: ACTIVE | OUTPATIENT
Start: 2019-06-18 | End: 2019-06-20

## 2019-06-18 RX ORDER — PROMETHAZINE HYDROCHLORIDE 25 MG/1
25 SUPPOSITORY RECTAL ONCE AS NEEDED
Status: DISCONTINUED | OUTPATIENT
Start: 2019-06-18 | End: 2019-06-18 | Stop reason: HOSPADM

## 2019-06-18 RX ORDER — FERROUS SULFATE TAB EC 324 MG (65 MG FE EQUIVALENT) 324 (65 FE) MG
324 TABLET DELAYED RESPONSE ORAL
Status: DISCONTINUED | OUTPATIENT
Start: 2019-06-19 | End: 2019-06-28 | Stop reason: HOSPADM

## 2019-06-18 RX ORDER — KETAMINE HYDROCHLORIDE 100 MG/ML
INJECTION INTRAMUSCULAR; INTRAVENOUS AS NEEDED
Status: DISCONTINUED | OUTPATIENT
Start: 2019-06-18 | End: 2019-06-18 | Stop reason: SURG

## 2019-06-18 RX ORDER — ACETAMINOPHEN 650 MG/1
650 SUPPOSITORY RECTAL ONCE AS NEEDED
Status: DISCONTINUED | OUTPATIENT
Start: 2019-06-18 | End: 2019-06-18 | Stop reason: HOSPADM

## 2019-06-18 RX ORDER — NEOSTIGMINE METHYLSULFATE 4 MG/4 ML
SYRINGE (ML) INTRAVENOUS AS NEEDED
Status: DISCONTINUED | OUTPATIENT
Start: 2019-06-18 | End: 2019-06-18 | Stop reason: SURG

## 2019-06-18 RX ORDER — MIDAZOLAM HYDROCHLORIDE 1 MG/ML
INJECTION INTRAMUSCULAR; INTRAVENOUS AS NEEDED
Status: DISCONTINUED | OUTPATIENT
Start: 2019-06-18 | End: 2019-06-18 | Stop reason: SURG

## 2019-06-18 RX ORDER — ONDANSETRON 2 MG/ML
4 INJECTION INTRAMUSCULAR; INTRAVENOUS EVERY 6 HOURS PRN
Status: DISCONTINUED | OUTPATIENT
Start: 2019-06-18 | End: 2019-06-28 | Stop reason: HOSPADM

## 2019-06-18 RX ORDER — SUCCINYLCHOLINE CHLORIDE 20 MG/ML
INJECTION INTRAMUSCULAR; INTRAVENOUS AS NEEDED
Status: DISCONTINUED | OUTPATIENT
Start: 2019-06-18 | End: 2019-06-18 | Stop reason: SURG

## 2019-06-18 RX ORDER — CARVEDILOL 3.12 MG/1
3.12 TABLET ORAL EVERY 12 HOURS SCHEDULED
Status: DISCONTINUED | OUTPATIENT
Start: 2019-06-18 | End: 2019-06-28 | Stop reason: HOSPADM

## 2019-06-18 RX ORDER — FAMOTIDINE 40 MG/1
40 TABLET, FILM COATED ORAL DAILY
Status: DISCONTINUED | OUTPATIENT
Start: 2019-06-18 | End: 2019-06-28 | Stop reason: HOSPADM

## 2019-06-18 RX ORDER — DOCUSATE SODIUM 100 MG/1
100 CAPSULE, LIQUID FILLED ORAL 2 TIMES DAILY PRN
Status: DISCONTINUED | OUTPATIENT
Start: 2019-06-18 | End: 2019-06-28 | Stop reason: HOSPADM

## 2019-06-18 RX ORDER — CYCLOBENZAPRINE HCL 10 MG
10 TABLET ORAL 3 TIMES DAILY PRN
Status: DISCONTINUED | OUTPATIENT
Start: 2019-06-18 | End: 2019-06-28 | Stop reason: HOSPADM

## 2019-06-18 RX ORDER — DEXAMETHASONE SODIUM PHOSPHATE 4 MG/ML
8 INJECTION, SOLUTION INTRA-ARTICULAR; INTRALESIONAL; INTRAMUSCULAR; INTRAVENOUS; SOFT TISSUE ONCE AS NEEDED
Status: DISCONTINUED | OUTPATIENT
Start: 2019-06-18 | End: 2019-06-18 | Stop reason: SDUPTHER

## 2019-06-18 RX ORDER — TRAMADOL HYDROCHLORIDE 50 MG/1
50 TABLET ORAL EVERY 6 HOURS PRN
Status: DISCONTINUED | OUTPATIENT
Start: 2019-06-18 | End: 2019-06-18 | Stop reason: SDUPTHER

## 2019-06-18 RX ORDER — BACITRACIN 50000 [IU]/1
INJECTION, POWDER, FOR SOLUTION INTRAMUSCULAR AS NEEDED
Status: DISCONTINUED | OUTPATIENT
Start: 2019-06-18 | End: 2019-06-28 | Stop reason: HOSPADM

## 2019-06-18 RX ORDER — GENTAMICIN SULFATE 40 MG/ML
INJECTION, SOLUTION INTRAMUSCULAR; INTRAVENOUS AS NEEDED
Status: DISCONTINUED | OUTPATIENT
Start: 2019-06-18 | End: 2019-06-28 | Stop reason: HOSPADM

## 2019-06-18 RX ORDER — OXYCODONE HYDROCHLORIDE 5 MG/1
5 TABLET ORAL EVERY 4 HOURS PRN
Status: DISCONTINUED | OUTPATIENT
Start: 2019-06-18 | End: 2019-06-28 | Stop reason: HOSPADM

## 2019-06-18 RX ORDER — FLUMAZENIL 0.1 MG/ML
0.2 INJECTION INTRAVENOUS AS NEEDED
Status: DISCONTINUED | OUTPATIENT
Start: 2019-06-18 | End: 2019-06-18 | Stop reason: HOSPADM

## 2019-06-18 RX ORDER — NALOXONE HCL 0.4 MG/ML
0.4 VIAL (ML) INJECTION AS NEEDED
Status: DISCONTINUED | OUTPATIENT
Start: 2019-06-18 | End: 2019-06-18 | Stop reason: HOSPADM

## 2019-06-18 RX ORDER — EPHEDRINE SULFATE 50 MG/ML
5 INJECTION, SOLUTION INTRAVENOUS ONCE AS NEEDED
Status: DISCONTINUED | OUTPATIENT
Start: 2019-06-18 | End: 2019-06-18 | Stop reason: HOSPADM

## 2019-06-18 RX ORDER — SODIUM CHLORIDE 0.9 % (FLUSH) 0.9 %
3 SYRINGE (ML) INJECTION EVERY 12 HOURS SCHEDULED
Status: DISCONTINUED | OUTPATIENT
Start: 2019-06-18 | End: 2019-06-28 | Stop reason: HOSPADM

## 2019-06-18 RX ORDER — DIPHENHYDRAMINE HYDROCHLORIDE 50 MG/ML
12.5 INJECTION INTRAMUSCULAR; INTRAVENOUS
Status: DISCONTINUED | OUTPATIENT
Start: 2019-06-18 | End: 2019-06-18 | Stop reason: HOSPADM

## 2019-06-18 RX ORDER — LIDOCAINE HYDROCHLORIDE 20 MG/ML
INJECTION, SOLUTION INFILTRATION; PERINEURAL AS NEEDED
Status: DISCONTINUED | OUTPATIENT
Start: 2019-06-18 | End: 2019-06-18 | Stop reason: SURG

## 2019-06-18 RX ORDER — LABETALOL HYDROCHLORIDE 5 MG/ML
5 INJECTION, SOLUTION INTRAVENOUS
Status: DISCONTINUED | OUTPATIENT
Start: 2019-06-18 | End: 2019-06-18 | Stop reason: HOSPADM

## 2019-06-18 RX ADMIN — PROPOFOL 120 MG: 10 INJECTION, EMULSION INTRAVENOUS at 14:20

## 2019-06-18 RX ADMIN — KETAMINE HYDROCHLORIDE 10 MG: 100 INJECTION INTRAMUSCULAR; INTRAVENOUS at 14:40

## 2019-06-18 RX ADMIN — ROCURONIUM BROMIDE 15 MG: 10 INJECTION INTRAVENOUS at 15:55

## 2019-06-18 RX ADMIN — EPHEDRINE SULFATE 10 MG: 50 INJECTION INTRAVENOUS at 14:49

## 2019-06-18 RX ADMIN — LIDOCAINE HYDROCHLORIDE 100 MG: 20 INJECTION, SOLUTION INFILTRATION; PERINEURAL at 14:20

## 2019-06-18 RX ADMIN — VANCOMYCIN HYDROCHLORIDE 2250 MG: 1 INJECTION, POWDER, LYOPHILIZED, FOR SOLUTION INTRAVENOUS at 22:56

## 2019-06-18 RX ADMIN — CARVEDILOL 3.12 MG: 3.12 TABLET, FILM COATED ORAL at 22:03

## 2019-06-18 RX ADMIN — SODIUM CHLORIDE, SODIUM GLUCONATE, SODIUM ACETATE, POTASSIUM CHLORIDE, AND MAGNESIUM CHLORIDE: 526; 502; 368; 37; 30 INJECTION, SOLUTION INTRAVENOUS at 15:48

## 2019-06-18 RX ADMIN — PHENYLEPHRINE HYDROCHLORIDE 100 MCG: 10 INJECTION INTRAVENOUS at 16:58

## 2019-06-18 RX ADMIN — SENNOSIDES AND DOCUSATE SODIUM 2 TABLET: 8.6; 5 TABLET ORAL at 22:01

## 2019-06-18 RX ADMIN — MIDAZOLAM HYDROCHLORIDE 1 MG: 2 INJECTION, SOLUTION INTRAMUSCULAR; INTRAVENOUS at 14:18

## 2019-06-18 RX ADMIN — PHENYLEPHRINE HYDROCHLORIDE 100 MCG: 10 INJECTION INTRAVENOUS at 16:45

## 2019-06-18 RX ADMIN — BUSPIRONE HYDROCHLORIDE 5 MG: 5 TABLET ORAL at 22:02

## 2019-06-18 RX ADMIN — MIDAZOLAM HYDROCHLORIDE 1 MG: 2 INJECTION, SOLUTION INTRAMUSCULAR; INTRAVENOUS at 14:14

## 2019-06-18 RX ADMIN — FENTANYL CITRATE 50 MCG: 50 INJECTION, SOLUTION INTRAMUSCULAR; INTRAVENOUS at 14:57

## 2019-06-18 RX ADMIN — EPHEDRINE SULFATE 10 MG: 50 INJECTION INTRAVENOUS at 14:46

## 2019-06-18 RX ADMIN — SODIUM CHLORIDE 75 ML/HR: 900 INJECTION INTRAVENOUS at 21:59

## 2019-06-18 RX ADMIN — Medication 3 MG: at 17:03

## 2019-06-18 RX ADMIN — SODIUM CHLORIDE 2 G: 9 INJECTION, SOLUTION INTRAVENOUS at 22:00

## 2019-06-18 RX ADMIN — FENTANYL CITRATE 50 MCG: 50 INJECTION, SOLUTION INTRAMUSCULAR; INTRAVENOUS at 15:26

## 2019-06-18 RX ADMIN — KETAMINE HYDROCHLORIDE 25 MG: 100 INJECTION INTRAMUSCULAR; INTRAVENOUS at 14:20

## 2019-06-18 RX ADMIN — Medication 2 G: at 14:26

## 2019-06-18 RX ADMIN — FENTANYL CITRATE 50 MCG: 50 INJECTION, SOLUTION INTRAMUSCULAR; INTRAVENOUS at 14:20

## 2019-06-18 RX ADMIN — EPHEDRINE SULFATE 10 MG: 50 INJECTION INTRAVENOUS at 14:33

## 2019-06-18 RX ADMIN — SODIUM CHLORIDE, PRESERVATIVE FREE 3 ML: 5 INJECTION INTRAVENOUS at 22:03

## 2019-06-18 RX ADMIN — PHENYLEPHRINE HYDROCHLORIDE 100 MCG: 10 INJECTION INTRAVENOUS at 16:33

## 2019-06-18 RX ADMIN — SODIUM CHLORIDE, SODIUM GLUCONATE, SODIUM ACETATE, POTASSIUM CHLORIDE, AND MAGNESIUM CHLORIDE 1000 ML: 526; 502; 368; 37; 30 INJECTION, SOLUTION INTRAVENOUS at 10:00

## 2019-06-18 RX ADMIN — DEXAMETHASONE SODIUM PHOSPHATE 4 MG: 4 INJECTION, SOLUTION INTRAMUSCULAR; INTRAVENOUS at 14:36

## 2019-06-18 RX ADMIN — PROPOFOL 20 MG: 10 INJECTION, EMULSION INTRAVENOUS at 15:21

## 2019-06-18 RX ADMIN — PHENYLEPHRINE HYDROCHLORIDE 100 MCG: 10 INJECTION INTRAVENOUS at 16:12

## 2019-06-18 RX ADMIN — HYDROMORPHONE HYDROCHLORIDE 0.5 MG: 1 INJECTION, SOLUTION INTRAMUSCULAR; INTRAVENOUS; SUBCUTANEOUS at 18:01

## 2019-06-18 RX ADMIN — OXYCODONE HYDROCHLORIDE 10 MG: 10 TABLET, FILM COATED, EXTENDED RELEASE ORAL at 22:01

## 2019-06-18 RX ADMIN — ONDANSETRON 4 MG: 2 INJECTION INTRAMUSCULAR; INTRAVENOUS at 16:50

## 2019-06-18 RX ADMIN — KETAMINE HYDROCHLORIDE 10 MG: 100 INJECTION INTRAMUSCULAR; INTRAVENOUS at 15:15

## 2019-06-18 RX ADMIN — ROCURONIUM BROMIDE 50 MG: 10 INJECTION INTRAVENOUS at 14:51

## 2019-06-18 RX ADMIN — HYDROMORPHONE HYDROCHLORIDE 0.5 MG: 1 INJECTION, SOLUTION INTRAMUSCULAR; INTRAVENOUS; SUBCUTANEOUS at 17:45

## 2019-06-18 RX ADMIN — SUCCINYLCHOLINE CHLORIDE 200 MG: 20 INJECTION, SOLUTION INTRAMUSCULAR; INTRAVENOUS at 14:20

## 2019-06-18 RX ADMIN — ROCURONIUM BROMIDE 5 MG: 10 INJECTION INTRAVENOUS at 14:20

## 2019-06-18 RX ADMIN — PHENYLEPHRINE HYDROCHLORIDE 100 MCG: 10 INJECTION INTRAVENOUS at 16:43

## 2019-06-18 RX ADMIN — GLYCOPYRROLATE 0.4 MG: 0.2 INJECTION, SOLUTION INTRAMUSCULAR; INTRAVITREAL at 17:03

## 2019-06-18 RX ADMIN — ACETAMINOPHEN 1000 MG: 500 TABLET ORAL at 22:02

## 2019-06-18 RX ADMIN — FAMOTIDINE 40 MG: 40 TABLET ORAL at 22:01

## 2019-06-18 NOTE — ANESTHESIA POSTPROCEDURE EVALUATION
Patient: Michael Sorto    Procedure Summary     Date:  06/18/19 Room / Location:  [facility] OR [address] [place] [address] OR    Anesthesia Start:  1416 Anesthesia Stop:  1731    Procedure:  REMOVAL OF TOTAL KNEE COMPONENTS RIGHT KNEE WITH INSERTION OF CEMENT ANTIBIOTIC SPACER (Right Knee) Diagnosis:       Pyogenic arthritis of right knee joint, due to unspecified organism (CMS/HCC)      (Pyogenic arthritis of right knee joint, due to unspecified organism (CMS/HCC) [M00.9])    Surgeon:  Rufino Lu MD Provider:  Virgil Avalos MD    Anesthesia Type:  general ASA Status:  4          Anesthesia Type: general  Last vitals  BP   172/96 (06/18/19 0949)   Temp   97.5 °F (36.4 °C) (06/18/19 0933)   Pulse   89 (06/18/19 0933)   Resp   18 (06/18/19 0933)     SpO2   96 % (06/18/19 0933)     Post Anesthesia Care and Evaluation    Patient location during evaluation: PACU  Patient participation: complete - patient participated  Level of consciousness: awake and alert  Pain score: 0  Pain management: adequate  Airway patency: patent  Anesthetic complications: No anesthetic complications  PONV Status: none  Cardiovascular status: acceptable and hemodynamically stable  Respiratory status: acceptable, face mask and spontaneous ventilation  Hydration status: acceptable

## 2019-06-18 NOTE — ANESTHESIA PROCEDURE NOTES
Airway  Urgency: elective    Airway not difficult    General Information and Staff    Patient location during procedure: OR  CRNA: Jing Salas CRNA    Indications and Patient Condition  Indications for airway management: airway protection    Preoxygenated: yes  Mask difficulty assessment: 0 - not attempted    Final Airway Details  Final airway type: endotracheal airway      Successful airway: ETT  Cuffed: yes   Successful intubation technique: RSI and video laryngoscopy  Facilitating devices/methods: intubating stylet  Endotracheal tube insertion site: oral  Blade: Thuna  Blade size: 4  ETT size (mm): 7.5  Cormack-Lehane Classification: grade I - full view of glottis  Placement verified by: chest auscultation and capnometry   Cuff volume (mL): 10  Measured from: teeth  ETT to teeth (cm): 21  Number of attempts at approach: 1

## 2019-06-18 NOTE — ANESTHESIA PREPROCEDURE EVALUATION
Anesthesia Evaluation     no history of anesthetic complications:  NPO Solid Status: > 8 hours  NPO Liquid Status: > 2 hours           Airway   Mallampati: III  TM distance: >3 FB  Neck ROM: limited  Difficult intubation highly probable  Dental    (+) poor dentition        Pulmonary     breath sounds clear to auscultation  (-) COPD, asthma, sleep apnea, not a smoker    ROS comment: Seasonal allergies  Cardiovascular   Exercise tolerance: poor (<4 METS)    ECG reviewed  Patient on routine beta blocker and Beta blocker given within 24 hours of surgery  Rhythm: regular  Rate: normal    (+) hypertension poorly controlled, valvular problems/murmurs (Bovine Aortic valve),   (-) dysrhythmias, angina, cardiac stents, DVT, hyperlipidemia    ROS comment:   Normal sinus rhythm  Left axis deviation  Right bundle branch block  Abnormal ECG  No previous ECGs available  [facility]      Neuro/Psych  (+) CVA, psychiatric history Anxiety and Depression,     (-) seizures, TIA, headaches  GI/Hepatic/Renal/Endo    (+) morbid obesity, GERD well controlled,  renal disease (hx of stones) stones,   (-) hepatitis, liver disease, diabetes, hypothyroidism    Musculoskeletal     (+) arthralgias,   Abdominal   (+) obese,    Substance History - negative use  (-) alcohol use, drug use     OB/GYN          Other   (+) arthritis     (-) history of cancer      Phys Exam Other: House arrest band on left arm                Anesthesia Plan    ASA 4     general   ([facility] in room)  intravenous induction   Anesthetic plan, all risks, benefits, and alternatives have been provided, discussed and informed consent has been obtained with: patient.

## 2019-06-19 ENCOUNTER — APPOINTMENT (OUTPATIENT)
Dept: INTERVENTIONAL RADIOLOGY/VASCULAR | Facility: HOSPITAL | Age: 64
End: 2019-06-19

## 2019-06-19 ENCOUNTER — APPOINTMENT (OUTPATIENT)
Dept: GENERAL RADIOLOGY | Facility: HOSPITAL | Age: 64
End: 2019-06-19

## 2019-06-19 ENCOUNTER — APPOINTMENT (OUTPATIENT)
Dept: ULTRASOUND IMAGING | Facility: HOSPITAL | Age: 64
End: 2019-06-19

## 2019-06-19 LAB
ANION GAP SERPL CALCULATED.3IONS-SCNC: 12 MMOL/L
BUN BLD-MCNC: 19 MG/DL (ref 8–23)
BUN/CREAT SERPL: 16.7 (ref 7–25)
CALCIUM SPEC-SCNC: 8.4 MG/DL (ref 8.6–10.5)
CHLORIDE SERPL-SCNC: 101 MMOL/L (ref 98–107)
CO2 SERPL-SCNC: 25 MMOL/L (ref 22–29)
CREAT BLD-MCNC: 1.14 MG/DL (ref 0.76–1.27)
CRP SERPL-MCNC: 3.56 MG/DL (ref 0–0.5)
DEPRECATED RDW RBC AUTO: 43.2 FL (ref 37–54)
ERYTHROCYTE [DISTWIDTH] IN BLOOD BY AUTOMATED COUNT: 13.7 % (ref 12.3–15.4)
ERYTHROCYTE [SEDIMENTATION RATE] IN BLOOD: 28 MM/HR (ref 0–15)
GFR SERPL CREATININE-BSD FRML MDRD: 65 ML/MIN/1.73
GLUCOSE BLD-MCNC: 138 MG/DL (ref 65–99)
HCT VFR BLD AUTO: 38.5 % (ref 37.5–51)
HGB BLD-MCNC: 12.8 G/DL (ref 13–17.7)
MCH RBC QN AUTO: 28.9 PG (ref 26.6–33)
MCHC RBC AUTO-ENTMCNC: 33.2 G/DL (ref 31.5–35.7)
MCV RBC AUTO: 86.9 FL (ref 79–97)
PLATELET # BLD AUTO: 141 10*3/MM3 (ref 140–450)
PMV BLD AUTO: 10.2 FL (ref 6–12)
POTASSIUM BLD-SCNC: 4.6 MMOL/L (ref 3.5–5.2)
RBC # BLD AUTO: 4.43 10*6/MM3 (ref 4.14–5.8)
SODIUM BLD-SCNC: 138 MMOL/L (ref 136–145)
WBC NRBC COR # BLD: 9.72 10*3/MM3 (ref 3.4–10.8)

## 2019-06-19 PROCEDURE — A9270 NON-COVERED ITEM OR SERVICE: HCPCS | Performed by: ORTHOPAEDIC SURGERY

## 2019-06-19 PROCEDURE — 63710000001 OXYCODONE 5 MG TABLET: Performed by: ORTHOPAEDIC SURGERY

## 2019-06-19 PROCEDURE — 86140 C-REACTIVE PROTEIN: CPT | Performed by: ORTHOPAEDIC SURGERY

## 2019-06-19 PROCEDURE — 25010000002 CEFAZOLIN PER 500 MG: Performed by: ORTHOPAEDIC SURGERY

## 2019-06-19 PROCEDURE — 99024 POSTOP FOLLOW-UP VISIT: CPT | Performed by: ORTHOPAEDIC SURGERY

## 2019-06-19 PROCEDURE — 63710000001 FAMOTIDINE 40 MG TABLET: Performed by: ORTHOPAEDIC SURGERY

## 2019-06-19 PROCEDURE — 63710000001 BUSPIRONE 5 MG TABLET: Performed by: ORTHOPAEDIC SURGERY

## 2019-06-19 PROCEDURE — 97166 OT EVAL MOD COMPLEX 45 MIN: CPT

## 2019-06-19 PROCEDURE — 80048 BASIC METABOLIC PNL TOTAL CA: CPT | Performed by: ORTHOPAEDIC SURGERY

## 2019-06-19 PROCEDURE — 85651 RBC SED RATE NONAUTOMATED: CPT | Performed by: ORTHOPAEDIC SURGERY

## 2019-06-19 PROCEDURE — 63710000001 TAMSULOSIN 0.4 MG CAPSULE: Performed by: ORTHOPAEDIC SURGERY

## 2019-06-19 PROCEDURE — 63710000001 OXYCODONE 10 MG TABLET EXTENDED-RELEASE 12 HOUR: Performed by: ORTHOPAEDIC SURGERY

## 2019-06-19 PROCEDURE — C1751 CATH, INF, PER/CENT/MIDLINE: HCPCS

## 2019-06-19 PROCEDURE — 85027 COMPLETE CBC AUTOMATED: CPT | Performed by: ORTHOPAEDIC SURGERY

## 2019-06-19 PROCEDURE — 63710000001 ACETAMINOPHEN 500 MG TABLET: Performed by: ORTHOPAEDIC SURGERY

## 2019-06-19 PROCEDURE — 63710000001 FERROUS SULFATE 324 (65 FE) MG TABLET DELAYED-RELEASE: Performed by: ORTHOPAEDIC SURGERY

## 2019-06-19 PROCEDURE — 63710000001 ASPIRIN EC 325 MG TABLET DELAYED-RELEASE: Performed by: ORTHOPAEDIC SURGERY

## 2019-06-19 PROCEDURE — 63710000001 CARVEDILOL 3.125 MG TABLET: Performed by: ORTHOPAEDIC SURGERY

## 2019-06-19 PROCEDURE — 25010000002 VANCOMYCIN 5 G RECONSTITUTED SOLUTION: Performed by: ORTHOPAEDIC SURGERY

## 2019-06-19 PROCEDURE — 97162 PT EVAL MOD COMPLEX 30 MIN: CPT

## 2019-06-19 RX ORDER — TAMSULOSIN HYDROCHLORIDE 0.4 MG/1
0.4 CAPSULE ORAL DAILY
Status: DISCONTINUED | OUTPATIENT
Start: 2019-06-19 | End: 2019-06-28 | Stop reason: HOSPADM

## 2019-06-19 RX ADMIN — REGULAR STRENGTH 325 MG: 325 TABLET ORAL at 20:11

## 2019-06-19 RX ADMIN — TAMSULOSIN HYDROCHLORIDE 0.4 MG: 0.4 CAPSULE ORAL at 11:42

## 2019-06-19 RX ADMIN — FAMOTIDINE 40 MG: 40 TABLET ORAL at 11:30

## 2019-06-19 RX ADMIN — REGULAR STRENGTH 325 MG: 325 TABLET ORAL at 11:30

## 2019-06-19 RX ADMIN — OXYCODONE HYDROCHLORIDE 10 MG: 10 TABLET, FILM COATED, EXTENDED RELEASE ORAL at 20:11

## 2019-06-19 RX ADMIN — VANCOMYCIN HYDROCHLORIDE 1750 MG: 5 INJECTION, POWDER, LYOPHILIZED, FOR SOLUTION INTRAVENOUS at 23:59

## 2019-06-19 RX ADMIN — ACETAMINOPHEN 1000 MG: 500 TABLET ORAL at 17:45

## 2019-06-19 RX ADMIN — SODIUM CHLORIDE 75 ML/HR: 900 INJECTION INTRAVENOUS at 11:31

## 2019-06-19 RX ADMIN — SODIUM CHLORIDE 2 G: 9 INJECTION, SOLUTION INTRAVENOUS at 05:33

## 2019-06-19 RX ADMIN — OXYCODONE HYDROCHLORIDE 5 MG: 5 TABLET ORAL at 05:39

## 2019-06-19 RX ADMIN — CARVEDILOL 3.12 MG: 3.12 TABLET, FILM COATED ORAL at 20:10

## 2019-06-19 RX ADMIN — BUSPIRONE HYDROCHLORIDE 5 MG: 5 TABLET ORAL at 20:10

## 2019-06-19 RX ADMIN — BUSPIRONE HYDROCHLORIDE 5 MG: 5 TABLET ORAL at 11:30

## 2019-06-19 RX ADMIN — CARVEDILOL 3.12 MG: 3.12 TABLET, FILM COATED ORAL at 11:29

## 2019-06-19 RX ADMIN — FERROUS SULFATE TAB EC 324 MG (65 MG FE EQUIVALENT) 324 MG: 324 (65 FE) TABLET DELAYED RESPONSE at 11:30

## 2019-06-19 RX ADMIN — ACETAMINOPHEN 1000 MG: 500 TABLET ORAL at 20:11

## 2019-06-19 RX ADMIN — ACETAMINOPHEN 1000 MG: 500 TABLET ORAL at 11:29

## 2019-06-19 RX ADMIN — SODIUM CHLORIDE, PRESERVATIVE FREE 3 ML: 5 INJECTION INTRAVENOUS at 20:12

## 2019-06-19 RX ADMIN — SENNOSIDES AND DOCUSATE SODIUM 2 TABLET: 8.6; 5 TABLET ORAL at 20:11

## 2019-06-19 RX ADMIN — OXYCODONE HYDROCHLORIDE 10 MG: 10 TABLET, FILM COATED, EXTENDED RELEASE ORAL at 11:30

## 2019-06-19 RX ADMIN — SODIUM CHLORIDE, PRESERVATIVE FREE 10 ML: 5 INJECTION INTRAVENOUS at 11:33

## 2019-06-19 RX ADMIN — VANCOMYCIN HYDROCHLORIDE 1750 MG: 5 INJECTION, POWDER, LYOPHILIZED, FOR SOLUTION INTRAVENOUS at 11:41

## 2019-06-20 LAB
LAB AP CASE REPORT: NORMAL
PATH REPORT.FINAL DX SPEC: NORMAL
PATH REPORT.GROSS SPEC: NORMAL
VANCOMYCIN PEAK SERPL-MCNC: 40.7 MCG/ML (ref 20–40)
VANCOMYCIN TROUGH SERPL-MCNC: 29.6 MCG/ML (ref 5–20)

## 2019-06-20 PROCEDURE — 80202 ASSAY OF VANCOMYCIN: CPT | Performed by: ORTHOPAEDIC SURGERY

## 2019-06-20 PROCEDURE — 94760 N-INVAS EAR/PLS OXIMETRY 1: CPT

## 2019-06-20 PROCEDURE — 97110 THERAPEUTIC EXERCISES: CPT

## 2019-06-20 PROCEDURE — 25010000002 VANCOMYCIN 5 G RECONSTITUTED SOLUTION: Performed by: ORTHOPAEDIC SURGERY

## 2019-06-20 PROCEDURE — 97530 THERAPEUTIC ACTIVITIES: CPT

## 2019-06-20 RX ADMIN — ACETAMINOPHEN 1000 MG: 500 TABLET ORAL at 17:23

## 2019-06-20 RX ADMIN — CARVEDILOL 3.12 MG: 3.12 TABLET, FILM COATED ORAL at 21:44

## 2019-06-20 RX ADMIN — BUSPIRONE HYDROCHLORIDE 5 MG: 5 TABLET ORAL at 08:50

## 2019-06-20 RX ADMIN — SENNOSIDES AND DOCUSATE SODIUM 2 TABLET: 8.6; 5 TABLET ORAL at 21:44

## 2019-06-20 RX ADMIN — OXYCODONE HYDROCHLORIDE 5 MG: 5 TABLET ORAL at 04:52

## 2019-06-20 RX ADMIN — OXYCODONE HYDROCHLORIDE 10 MG: 10 TABLET, FILM COATED, EXTENDED RELEASE ORAL at 21:43

## 2019-06-20 RX ADMIN — BUSPIRONE HYDROCHLORIDE 5 MG: 5 TABLET ORAL at 21:43

## 2019-06-20 RX ADMIN — OXYCODONE HYDROCHLORIDE 10 MG: 10 TABLET, FILM COATED, EXTENDED RELEASE ORAL at 08:50

## 2019-06-20 RX ADMIN — REGULAR STRENGTH 325 MG: 325 TABLET ORAL at 21:44

## 2019-06-20 RX ADMIN — FERROUS SULFATE TAB EC 324 MG (65 MG FE EQUIVALENT) 324 MG: 324 (65 FE) TABLET DELAYED RESPONSE at 08:50

## 2019-06-20 RX ADMIN — ACETAMINOPHEN 1000 MG: 500 TABLET ORAL at 21:43

## 2019-06-20 RX ADMIN — CARVEDILOL 3.12 MG: 3.12 TABLET, FILM COATED ORAL at 08:50

## 2019-06-20 RX ADMIN — ACETAMINOPHEN 1000 MG: 500 TABLET ORAL at 08:50

## 2019-06-20 RX ADMIN — SODIUM CHLORIDE 75 ML/HR: 900 INJECTION INTRAVENOUS at 17:26

## 2019-06-20 RX ADMIN — TAMSULOSIN HYDROCHLORIDE 0.4 MG: 0.4 CAPSULE ORAL at 08:50

## 2019-06-20 RX ADMIN — REGULAR STRENGTH 325 MG: 325 TABLET ORAL at 08:50

## 2019-06-20 RX ADMIN — FAMOTIDINE 40 MG: 40 TABLET ORAL at 08:50

## 2019-06-20 RX ADMIN — VANCOMYCIN HYDROCHLORIDE 1250 MG: 5 INJECTION, POWDER, LYOPHILIZED, FOR SOLUTION INTRAVENOUS at 21:43

## 2019-06-20 RX ADMIN — ACETAMINOPHEN 1000 MG: 500 TABLET ORAL at 12:48

## 2019-06-21 LAB
BACTERIA SPEC AEROBE CULT: NORMAL
BACTERIA SPEC AEROBE CULT: NORMAL
GRAM STN SPEC: NORMAL

## 2019-06-21 PROCEDURE — 87205 SMEAR GRAM STAIN: CPT | Performed by: ORTHOPAEDIC SURGERY

## 2019-06-21 PROCEDURE — 99024 POSTOP FOLLOW-UP VISIT: CPT | Performed by: ORTHOPAEDIC SURGERY

## 2019-06-21 PROCEDURE — 97535 SELF CARE MNGMENT TRAINING: CPT

## 2019-06-21 PROCEDURE — 97110 THERAPEUTIC EXERCISES: CPT

## 2019-06-21 PROCEDURE — 25010000002 VANCOMYCIN 1 G RECONSTITUTED SOLUTION: Performed by: ORTHOPAEDIC SURGERY

## 2019-06-21 PROCEDURE — 25010000002 VANCOMYCIN 5 G RECONSTITUTED SOLUTION: Performed by: ORTHOPAEDIC SURGERY

## 2019-06-21 PROCEDURE — 87075 CULTR BACTERIA EXCEPT BLOOD: CPT | Performed by: ORTHOPAEDIC SURGERY

## 2019-06-21 RX ADMIN — REGULAR STRENGTH 325 MG: 325 TABLET ORAL at 08:56

## 2019-06-21 RX ADMIN — BUSPIRONE HYDROCHLORIDE 5 MG: 5 TABLET ORAL at 08:55

## 2019-06-21 RX ADMIN — SODIUM CHLORIDE 75 ML/HR: 900 INJECTION INTRAVENOUS at 08:56

## 2019-06-21 RX ADMIN — OXYCODONE HYDROCHLORIDE 10 MG: 10 TABLET, FILM COATED, EXTENDED RELEASE ORAL at 08:55

## 2019-06-21 RX ADMIN — FERROUS SULFATE TAB EC 324 MG (65 MG FE EQUIVALENT) 324 MG: 324 (65 FE) TABLET DELAYED RESPONSE at 08:55

## 2019-06-21 RX ADMIN — REGULAR STRENGTH 325 MG: 325 TABLET ORAL at 21:16

## 2019-06-21 RX ADMIN — ACETAMINOPHEN 1000 MG: 500 TABLET ORAL at 11:23

## 2019-06-21 RX ADMIN — FAMOTIDINE 40 MG: 40 TABLET ORAL at 08:55

## 2019-06-21 RX ADMIN — ACETAMINOPHEN 1000 MG: 500 TABLET ORAL at 08:55

## 2019-06-21 RX ADMIN — VANCOMYCIN HYDROCHLORIDE 1250 MG: 5 INJECTION, POWDER, LYOPHILIZED, FOR SOLUTION INTRAVENOUS at 11:02

## 2019-06-21 RX ADMIN — CARVEDILOL 3.12 MG: 3.12 TABLET, FILM COATED ORAL at 08:55

## 2019-06-21 RX ADMIN — TAMSULOSIN HYDROCHLORIDE 0.4 MG: 0.4 CAPSULE ORAL at 08:55

## 2019-06-21 RX ADMIN — VANCOMYCIN HYDROCHLORIDE 1250 MG: 5 INJECTION, POWDER, LYOPHILIZED, FOR SOLUTION INTRAVENOUS at 23:55

## 2019-06-21 RX ADMIN — SENNOSIDES AND DOCUSATE SODIUM 2 TABLET: 8.6; 5 TABLET ORAL at 20:53

## 2019-06-21 RX ADMIN — ACETAMINOPHEN 1000 MG: 500 TABLET ORAL at 17:40

## 2019-06-21 RX ADMIN — OXYCODONE HYDROCHLORIDE 10 MG: 10 TABLET, FILM COATED, EXTENDED RELEASE ORAL at 20:53

## 2019-06-21 RX ADMIN — CARVEDILOL 3.12 MG: 3.12 TABLET, FILM COATED ORAL at 20:54

## 2019-06-21 RX ADMIN — BUSPIRONE HYDROCHLORIDE 5 MG: 5 TABLET ORAL at 20:54

## 2019-06-22 LAB
ANION GAP SERPL CALCULATED.3IONS-SCNC: 9 MMOL/L
BACTERIA SPEC AEROBE CULT: ABNORMAL
BUN BLD-MCNC: 19 MG/DL (ref 8–23)
BUN/CREAT SERPL: 17.8 (ref 7–25)
CALCIUM SPEC-SCNC: 8.5 MG/DL (ref 8.6–10.5)
CHLORIDE SERPL-SCNC: 108 MMOL/L (ref 98–107)
CO2 SERPL-SCNC: 25 MMOL/L (ref 22–29)
CREAT BLD-MCNC: 1.07 MG/DL (ref 0.76–1.27)
GFR SERPL CREATININE-BSD FRML MDRD: 70 ML/MIN/1.73
GLUCOSE BLD-MCNC: 114 MG/DL (ref 65–99)
GRAM STN SPEC: ABNORMAL
GRAM STN SPEC: ABNORMAL
POTASSIUM BLD-SCNC: 4.7 MMOL/L (ref 3.5–5.2)
SODIUM BLD-SCNC: 142 MMOL/L (ref 136–145)
VANCOMYCIN PEAK SERPL-MCNC: 40 MCG/ML (ref 20–40)
VANCOMYCIN TROUGH SERPL-MCNC: 26.8 MCG/ML (ref 5–20)
WHOLE BLOOD HOLD SPECIMEN: NORMAL

## 2019-06-22 PROCEDURE — 97530 THERAPEUTIC ACTIVITIES: CPT | Performed by: PHYSICAL THERAPIST

## 2019-06-22 PROCEDURE — 80202 ASSAY OF VANCOMYCIN: CPT | Performed by: ORTHOPAEDIC SURGERY

## 2019-06-22 PROCEDURE — 80048 BASIC METABOLIC PNL TOTAL CA: CPT | Performed by: ORTHOPAEDIC SURGERY

## 2019-06-22 PROCEDURE — 97110 THERAPEUTIC EXERCISES: CPT

## 2019-06-22 PROCEDURE — 97110 THERAPEUTIC EXERCISES: CPT | Performed by: PHYSICAL THERAPIST

## 2019-06-22 PROCEDURE — 99024 POSTOP FOLLOW-UP VISIT: CPT | Performed by: ORTHOPAEDIC SURGERY

## 2019-06-22 PROCEDURE — 25010000002 AMPICILLIN PER 500 MG: Performed by: ORTHOPAEDIC SURGERY

## 2019-06-22 RX ADMIN — ACETAMINOPHEN 1000 MG: 500 TABLET ORAL at 21:25

## 2019-06-22 RX ADMIN — TAMSULOSIN HYDROCHLORIDE 0.4 MG: 0.4 CAPSULE ORAL at 09:04

## 2019-06-22 RX ADMIN — SENNOSIDES AND DOCUSATE SODIUM 2 TABLET: 8.6; 5 TABLET ORAL at 21:25

## 2019-06-22 RX ADMIN — OXYCODONE HYDROCHLORIDE 10 MG: 10 TABLET, FILM COATED, EXTENDED RELEASE ORAL at 09:04

## 2019-06-22 RX ADMIN — OXYCODONE HYDROCHLORIDE 10 MG: 10 TABLET, FILM COATED, EXTENDED RELEASE ORAL at 21:25

## 2019-06-22 RX ADMIN — BUSPIRONE HYDROCHLORIDE 5 MG: 5 TABLET ORAL at 21:25

## 2019-06-22 RX ADMIN — ACETAMINOPHEN 1000 MG: 500 TABLET ORAL at 11:09

## 2019-06-22 RX ADMIN — BUSPIRONE HYDROCHLORIDE 5 MG: 5 TABLET ORAL at 09:04

## 2019-06-22 RX ADMIN — CARVEDILOL 3.12 MG: 3.12 TABLET, FILM COATED ORAL at 09:04

## 2019-06-22 RX ADMIN — FAMOTIDINE 40 MG: 40 TABLET ORAL at 09:04

## 2019-06-22 RX ADMIN — FERROUS SULFATE TAB EC 324 MG (65 MG FE EQUIVALENT) 324 MG: 324 (65 FE) TABLET DELAYED RESPONSE at 09:04

## 2019-06-22 RX ADMIN — Medication: at 05:10

## 2019-06-22 RX ADMIN — REGULAR STRENGTH 325 MG: 325 TABLET ORAL at 21:25

## 2019-06-22 RX ADMIN — SODIUM CHLORIDE 75 ML/HR: 900 INJECTION INTRAVENOUS at 14:01

## 2019-06-22 RX ADMIN — REGULAR STRENGTH 325 MG: 325 TABLET ORAL at 09:04

## 2019-06-22 RX ADMIN — ACETAMINOPHEN 1000 MG: 500 TABLET ORAL at 09:04

## 2019-06-22 RX ADMIN — AMPICILLIN SODIUM 2 G: 2 INJECTION, POWDER, FOR SOLUTION INTRAVENOUS at 21:26

## 2019-06-22 RX ADMIN — AMPICILLIN SODIUM 2 G: 2 INJECTION, POWDER, FOR SOLUTION INTRAVENOUS at 11:09

## 2019-06-22 RX ADMIN — AMPICILLIN SODIUM 2 G: 2 INJECTION, POWDER, FOR SOLUTION INTRAVENOUS at 17:37

## 2019-06-22 RX ADMIN — ACETAMINOPHEN 1000 MG: 500 TABLET ORAL at 17:37

## 2019-06-22 RX ADMIN — CARVEDILOL 3.12 MG: 3.12 TABLET, FILM COATED ORAL at 21:26

## 2019-06-23 LAB
BACTERIA SPEC AEROBE CULT: ABNORMAL
GRAM STN SPEC: ABNORMAL
GRAM STN SPEC: ABNORMAL

## 2019-06-23 PROCEDURE — 25010000002 AMPICILLIN PER 500 MG: Performed by: ORTHOPAEDIC SURGERY

## 2019-06-23 PROCEDURE — 97110 THERAPEUTIC EXERCISES: CPT

## 2019-06-23 PROCEDURE — 99024 POSTOP FOLLOW-UP VISIT: CPT | Performed by: ORTHOPAEDIC SURGERY

## 2019-06-23 PROCEDURE — 97530 THERAPEUTIC ACTIVITIES: CPT

## 2019-06-23 RX ADMIN — REGULAR STRENGTH 325 MG: 325 TABLET ORAL at 09:08

## 2019-06-23 RX ADMIN — AMPICILLIN SODIUM 2 G: 2 INJECTION, POWDER, FOR SOLUTION INTRAVENOUS at 04:18

## 2019-06-23 RX ADMIN — ACETAMINOPHEN 1000 MG: 500 TABLET ORAL at 09:08

## 2019-06-23 RX ADMIN — TAMSULOSIN HYDROCHLORIDE 0.4 MG: 0.4 CAPSULE ORAL at 09:08

## 2019-06-23 RX ADMIN — AMPICILLIN SODIUM 2 G: 2 INJECTION, POWDER, FOR SOLUTION INTRAVENOUS at 20:44

## 2019-06-23 RX ADMIN — BUSPIRONE HYDROCHLORIDE 5 MG: 5 TABLET ORAL at 09:08

## 2019-06-23 RX ADMIN — ACETAMINOPHEN 1000 MG: 500 TABLET ORAL at 11:18

## 2019-06-23 RX ADMIN — CYCLOBENZAPRINE HYDROCHLORIDE 10 MG: 10 TABLET, FILM COATED ORAL at 20:44

## 2019-06-23 RX ADMIN — SODIUM CHLORIDE 75 ML/HR: 900 INJECTION INTRAVENOUS at 17:23

## 2019-06-23 RX ADMIN — AMPICILLIN SODIUM 2 G: 2 INJECTION, POWDER, FOR SOLUTION INTRAVENOUS at 17:23

## 2019-06-23 RX ADMIN — FAMOTIDINE 40 MG: 40 TABLET ORAL at 09:08

## 2019-06-23 RX ADMIN — SODIUM CHLORIDE 75 ML/HR: 900 INJECTION INTRAVENOUS at 05:27

## 2019-06-23 RX ADMIN — AMPICILLIN SODIUM 2 G: 2 INJECTION, POWDER, FOR SOLUTION INTRAVENOUS at 11:18

## 2019-06-23 RX ADMIN — CARVEDILOL 3.12 MG: 3.12 TABLET, FILM COATED ORAL at 20:43

## 2019-06-23 RX ADMIN — ACETAMINOPHEN 1000 MG: 500 TABLET ORAL at 18:02

## 2019-06-23 RX ADMIN — OXYCODONE HYDROCHLORIDE 10 MG: 10 TABLET, FILM COATED, EXTENDED RELEASE ORAL at 09:08

## 2019-06-23 RX ADMIN — FERROUS SULFATE TAB EC 324 MG (65 MG FE EQUIVALENT) 324 MG: 324 (65 FE) TABLET DELAYED RESPONSE at 09:08

## 2019-06-23 RX ADMIN — REGULAR STRENGTH 325 MG: 325 TABLET ORAL at 20:44

## 2019-06-23 RX ADMIN — SENNOSIDES AND DOCUSATE SODIUM 2 TABLET: 8.6; 5 TABLET ORAL at 20:43

## 2019-06-23 RX ADMIN — CARVEDILOL 3.12 MG: 3.12 TABLET, FILM COATED ORAL at 09:08

## 2019-06-23 RX ADMIN — SODIUM CHLORIDE, PRESERVATIVE FREE 3 ML: 5 INJECTION INTRAVENOUS at 20:44

## 2019-06-23 RX ADMIN — BUSPIRONE HYDROCHLORIDE 5 MG: 5 TABLET ORAL at 20:43

## 2019-06-24 LAB
BACTERIA SPEC AEROBE CULT: ABNORMAL
BACTERIA SPEC AEROBE CULT: ABNORMAL
GRAM STN SPEC: ABNORMAL
GRAM STN SPEC: ABNORMAL

## 2019-06-24 PROCEDURE — 97110 THERAPEUTIC EXERCISES: CPT

## 2019-06-24 PROCEDURE — 97535 SELF CARE MNGMENT TRAINING: CPT

## 2019-06-24 PROCEDURE — 25010000002 AMPICILLIN PER 500 MG: Performed by: ORTHOPAEDIC SURGERY

## 2019-06-24 PROCEDURE — 97530 THERAPEUTIC ACTIVITIES: CPT

## 2019-06-24 RX ADMIN — CARVEDILOL 3.12 MG: 3.12 TABLET, FILM COATED ORAL at 09:07

## 2019-06-24 RX ADMIN — BUSPIRONE HYDROCHLORIDE 5 MG: 5 TABLET ORAL at 09:07

## 2019-06-24 RX ADMIN — FERROUS SULFATE TAB EC 324 MG (65 MG FE EQUIVALENT) 324 MG: 324 (65 FE) TABLET DELAYED RESPONSE at 09:06

## 2019-06-24 RX ADMIN — TAMSULOSIN HYDROCHLORIDE 0.4 MG: 0.4 CAPSULE ORAL at 09:06

## 2019-06-24 RX ADMIN — SODIUM CHLORIDE 75 ML/HR: 900 INJECTION INTRAVENOUS at 09:06

## 2019-06-24 RX ADMIN — SENNOSIDES AND DOCUSATE SODIUM 2 TABLET: 8.6; 5 TABLET ORAL at 20:34

## 2019-06-24 RX ADMIN — AMPICILLIN SODIUM 2 G: 2 INJECTION, POWDER, FOR SOLUTION INTRAVENOUS at 18:27

## 2019-06-24 RX ADMIN — BUSPIRONE HYDROCHLORIDE 5 MG: 5 TABLET ORAL at 20:35

## 2019-06-24 RX ADMIN — AMPICILLIN SODIUM 2 G: 2 INJECTION, POWDER, FOR SOLUTION INTRAVENOUS at 12:54

## 2019-06-24 RX ADMIN — ACETAMINOPHEN 1000 MG: 500 TABLET ORAL at 18:27

## 2019-06-24 RX ADMIN — FAMOTIDINE 40 MG: 40 TABLET ORAL at 09:06

## 2019-06-24 RX ADMIN — ACETAMINOPHEN 1000 MG: 500 TABLET ORAL at 09:06

## 2019-06-24 RX ADMIN — AMPICILLIN SODIUM 2 G: 2 INJECTION, POWDER, FOR SOLUTION INTRAVENOUS at 04:10

## 2019-06-24 RX ADMIN — ACETAMINOPHEN 1000 MG: 500 TABLET ORAL at 20:34

## 2019-06-24 RX ADMIN — CARVEDILOL 3.12 MG: 3.12 TABLET, FILM COATED ORAL at 20:35

## 2019-06-24 RX ADMIN — ACETAMINOPHEN 1000 MG: 500 TABLET ORAL at 12:54

## 2019-06-24 RX ADMIN — REGULAR STRENGTH 325 MG: 325 TABLET ORAL at 09:07

## 2019-06-24 RX ADMIN — REGULAR STRENGTH 325 MG: 325 TABLET ORAL at 20:35

## 2019-06-25 LAB
BASOPHILS # BLD AUTO: 0.01 10*3/MM3 (ref 0–0.2)
BASOPHILS NFR BLD AUTO: 0.2 % (ref 0–1.5)
DEPRECATED RDW RBC AUTO: 46.5 FL (ref 37–54)
EOSINOPHIL # BLD AUTO: 0.13 10*3/MM3 (ref 0–0.4)
EOSINOPHIL NFR BLD AUTO: 2.2 % (ref 0.3–6.2)
ERYTHROCYTE [DISTWIDTH] IN BLOOD BY AUTOMATED COUNT: 14.7 % (ref 12.3–15.4)
HCT VFR BLD AUTO: 29.8 % (ref 37.5–51)
HGB BLD-MCNC: 9.8 G/DL (ref 13–17.7)
IMM GRANULOCYTES # BLD AUTO: 0.02 10*3/MM3 (ref 0–0.05)
IMM GRANULOCYTES NFR BLD AUTO: 0.3 % (ref 0–0.5)
LYMPHOCYTES # BLD AUTO: 1.27 10*3/MM3 (ref 0.7–3.1)
LYMPHOCYTES NFR BLD AUTO: 21.9 % (ref 19.6–45.3)
MCH RBC QN AUTO: 29.1 PG (ref 26.6–33)
MCHC RBC AUTO-ENTMCNC: 32.9 G/DL (ref 31.5–35.7)
MCV RBC AUTO: 88.4 FL (ref 79–97)
MONOCYTES # BLD AUTO: 0.55 10*3/MM3 (ref 0.1–0.9)
MONOCYTES NFR BLD AUTO: 9.5 % (ref 5–12)
NEUTROPHILS # BLD AUTO: 3.83 10*3/MM3 (ref 1.7–7)
NEUTROPHILS NFR BLD AUTO: 65.9 % (ref 42.7–76)
NRBC BLD AUTO-RTO: 0 /100 WBC (ref 0–0.2)
PLATELET # BLD AUTO: 124 10*3/MM3 (ref 140–450)
PMV BLD AUTO: 9.8 FL (ref 6–12)
RBC # BLD AUTO: 3.37 10*6/MM3 (ref 4.14–5.8)
WBC NRBC COR # BLD: 5.81 10*3/MM3 (ref 3.4–10.8)

## 2019-06-25 PROCEDURE — 85025 COMPLETE CBC W/AUTO DIFF WBC: CPT | Performed by: ORTHOPAEDIC SURGERY

## 2019-06-25 PROCEDURE — 99024 POSTOP FOLLOW-UP VISIT: CPT | Performed by: ORTHOPAEDIC SURGERY

## 2019-06-25 PROCEDURE — 97535 SELF CARE MNGMENT TRAINING: CPT

## 2019-06-25 PROCEDURE — 25010000002 AMPICILLIN PER 500 MG: Performed by: ORTHOPAEDIC SURGERY

## 2019-06-25 PROCEDURE — 97110 THERAPEUTIC EXERCISES: CPT

## 2019-06-25 PROCEDURE — 97530 THERAPEUTIC ACTIVITIES: CPT

## 2019-06-25 RX ADMIN — SODIUM CHLORIDE 75 ML/HR: 900 INJECTION INTRAVENOUS at 16:59

## 2019-06-25 RX ADMIN — BUSPIRONE HYDROCHLORIDE 5 MG: 5 TABLET ORAL at 09:16

## 2019-06-25 RX ADMIN — SODIUM CHLORIDE 75 ML/HR: 900 INJECTION INTRAVENOUS at 09:16

## 2019-06-25 RX ADMIN — AMPICILLIN SODIUM 2 G: 2 INJECTION, POWDER, FOR SOLUTION INTRAVENOUS at 17:01

## 2019-06-25 RX ADMIN — AMPICILLIN SODIUM 2 G: 2 INJECTION, POWDER, FOR SOLUTION INTRAVENOUS at 10:50

## 2019-06-25 RX ADMIN — ACETAMINOPHEN 1000 MG: 500 TABLET ORAL at 09:16

## 2019-06-25 RX ADMIN — ACETAMINOPHEN 1000 MG: 500 TABLET ORAL at 22:30

## 2019-06-25 RX ADMIN — TAMSULOSIN HYDROCHLORIDE 0.4 MG: 0.4 CAPSULE ORAL at 09:16

## 2019-06-25 RX ADMIN — SODIUM CHLORIDE, PRESERVATIVE FREE 10 ML: 5 INJECTION INTRAVENOUS at 22:28

## 2019-06-25 RX ADMIN — REGULAR STRENGTH 325 MG: 325 TABLET ORAL at 09:16

## 2019-06-25 RX ADMIN — AMPICILLIN SODIUM 2 G: 2 INJECTION, POWDER, FOR SOLUTION INTRAVENOUS at 22:39

## 2019-06-25 RX ADMIN — FAMOTIDINE 40 MG: 40 TABLET ORAL at 09:16

## 2019-06-25 RX ADMIN — SODIUM CHLORIDE 75 ML/HR: 900 INJECTION INTRAVENOUS at 17:03

## 2019-06-25 RX ADMIN — FERROUS SULFATE TAB EC 324 MG (65 MG FE EQUIVALENT) 324 MG: 324 (65 FE) TABLET DELAYED RESPONSE at 09:16

## 2019-06-25 RX ADMIN — CARVEDILOL 3.12 MG: 3.12 TABLET, FILM COATED ORAL at 09:16

## 2019-06-25 RX ADMIN — ACETAMINOPHEN 1000 MG: 500 TABLET ORAL at 13:04

## 2019-06-25 RX ADMIN — AMPICILLIN SODIUM 2 G: 2 INJECTION, POWDER, FOR SOLUTION INTRAVENOUS at 06:43

## 2019-06-25 RX ADMIN — OXYCODONE HYDROCHLORIDE 5 MG: 5 TABLET ORAL at 11:07

## 2019-06-25 RX ADMIN — SENNOSIDES AND DOCUSATE SODIUM 2 TABLET: 8.6; 5 TABLET ORAL at 22:30

## 2019-06-25 RX ADMIN — REGULAR STRENGTH 325 MG: 325 TABLET ORAL at 22:31

## 2019-06-25 RX ADMIN — BUSPIRONE HYDROCHLORIDE 5 MG: 5 TABLET ORAL at 22:30

## 2019-06-25 RX ADMIN — AMPICILLIN SODIUM 2 G: 2 INJECTION, POWDER, FOR SOLUTION INTRAVENOUS at 00:08

## 2019-06-25 RX ADMIN — ACETAMINOPHEN 1000 MG: 500 TABLET ORAL at 17:00

## 2019-06-26 LAB
BACTERIA SPEC ANAEROBE CULT: NORMAL
GRAM STN SPEC: NORMAL
GRAM STN SPEC: NORMAL

## 2019-06-26 PROCEDURE — 25010000002 AMPICILLIN PER 500 MG: Performed by: ORTHOPAEDIC SURGERY

## 2019-06-26 PROCEDURE — 99024 POSTOP FOLLOW-UP VISIT: CPT | Performed by: ORTHOPAEDIC SURGERY

## 2019-06-26 PROCEDURE — 97535 SELF CARE MNGMENT TRAINING: CPT

## 2019-06-26 PROCEDURE — 97110 THERAPEUTIC EXERCISES: CPT

## 2019-06-26 RX ADMIN — SODIUM CHLORIDE 75 ML/HR: 900 INJECTION INTRAVENOUS at 08:19

## 2019-06-26 RX ADMIN — FAMOTIDINE 40 MG: 40 TABLET ORAL at 08:20

## 2019-06-26 RX ADMIN — AMPICILLIN SODIUM 2 G: 2 INJECTION, POWDER, FOR SOLUTION INTRAVENOUS at 21:18

## 2019-06-26 RX ADMIN — FERROUS SULFATE TAB EC 324 MG (65 MG FE EQUIVALENT) 324 MG: 324 (65 FE) TABLET DELAYED RESPONSE at 08:19

## 2019-06-26 RX ADMIN — BUSPIRONE HYDROCHLORIDE 5 MG: 5 TABLET ORAL at 21:18

## 2019-06-26 RX ADMIN — ACETAMINOPHEN 1000 MG: 500 TABLET ORAL at 11:11

## 2019-06-26 RX ADMIN — AMPICILLIN SODIUM 2 G: 2 INJECTION, POWDER, FOR SOLUTION INTRAVENOUS at 04:22

## 2019-06-26 RX ADMIN — SODIUM CHLORIDE 75 ML/HR: 900 INJECTION INTRAVENOUS at 21:17

## 2019-06-26 RX ADMIN — ACETAMINOPHEN 1000 MG: 500 TABLET ORAL at 17:10

## 2019-06-26 RX ADMIN — CARVEDILOL 3.12 MG: 3.12 TABLET, FILM COATED ORAL at 21:18

## 2019-06-26 RX ADMIN — AMPICILLIN SODIUM 2 G: 2 INJECTION, POWDER, FOR SOLUTION INTRAVENOUS at 10:15

## 2019-06-26 RX ADMIN — TAMSULOSIN HYDROCHLORIDE 0.4 MG: 0.4 CAPSULE ORAL at 08:20

## 2019-06-26 RX ADMIN — BUSPIRONE HYDROCHLORIDE 5 MG: 5 TABLET ORAL at 08:19

## 2019-06-26 RX ADMIN — CARVEDILOL 3.12 MG: 3.12 TABLET, FILM COATED ORAL at 08:20

## 2019-06-26 RX ADMIN — REGULAR STRENGTH 325 MG: 325 TABLET ORAL at 08:19

## 2019-06-26 RX ADMIN — SENNOSIDES AND DOCUSATE SODIUM 2 TABLET: 8.6; 5 TABLET ORAL at 21:18

## 2019-06-26 RX ADMIN — AMPICILLIN SODIUM 2 G: 2 INJECTION, POWDER, FOR SOLUTION INTRAVENOUS at 16:07

## 2019-06-26 RX ADMIN — ACETAMINOPHEN 1000 MG: 500 TABLET ORAL at 21:18

## 2019-06-27 PROCEDURE — 97535 SELF CARE MNGMENT TRAINING: CPT

## 2019-06-27 PROCEDURE — 97110 THERAPEUTIC EXERCISES: CPT

## 2019-06-27 PROCEDURE — 25010000002 AMPICILLIN PER 500 MG: Performed by: ORTHOPAEDIC SURGERY

## 2019-06-27 PROCEDURE — 97530 THERAPEUTIC ACTIVITIES: CPT

## 2019-06-27 RX ADMIN — CARVEDILOL 3.12 MG: 3.12 TABLET, FILM COATED ORAL at 10:01

## 2019-06-27 RX ADMIN — AMPICILLIN SODIUM 2 G: 2 INJECTION, POWDER, FOR SOLUTION INTRAVENOUS at 20:30

## 2019-06-27 RX ADMIN — FERROUS SULFATE TAB EC 324 MG (65 MG FE EQUIVALENT) 324 MG: 324 (65 FE) TABLET DELAYED RESPONSE at 10:00

## 2019-06-27 RX ADMIN — REGULAR STRENGTH 325 MG: 325 TABLET ORAL at 20:30

## 2019-06-27 RX ADMIN — ACETAMINOPHEN 1000 MG: 500 TABLET ORAL at 20:31

## 2019-06-27 RX ADMIN — AMPICILLIN SODIUM 2 G: 2 INJECTION, POWDER, FOR SOLUTION INTRAVENOUS at 17:20

## 2019-06-27 RX ADMIN — ACETAMINOPHEN 1000 MG: 500 TABLET ORAL at 10:01

## 2019-06-27 RX ADMIN — TAMSULOSIN HYDROCHLORIDE 0.4 MG: 0.4 CAPSULE ORAL at 10:01

## 2019-06-27 RX ADMIN — REGULAR STRENGTH 325 MG: 325 TABLET ORAL at 10:01

## 2019-06-27 RX ADMIN — CARVEDILOL 3.12 MG: 3.12 TABLET, FILM COATED ORAL at 20:30

## 2019-06-27 RX ADMIN — BUSPIRONE HYDROCHLORIDE 5 MG: 5 TABLET ORAL at 10:02

## 2019-06-27 RX ADMIN — BUSPIRONE HYDROCHLORIDE 5 MG: 5 TABLET ORAL at 20:30

## 2019-06-27 RX ADMIN — SODIUM CHLORIDE 75 ML/HR: 900 INJECTION INTRAVENOUS at 12:05

## 2019-06-27 RX ADMIN — FAMOTIDINE 40 MG: 40 TABLET ORAL at 10:00

## 2019-06-27 RX ADMIN — AMPICILLIN SODIUM 2 G: 2 INJECTION, POWDER, FOR SOLUTION INTRAVENOUS at 10:00

## 2019-06-27 RX ADMIN — ACETAMINOPHEN 1000 MG: 500 TABLET ORAL at 17:20

## 2019-06-27 RX ADMIN — AMPICILLIN SODIUM 2 G: 2 INJECTION, POWDER, FOR SOLUTION INTRAVENOUS at 05:49

## 2019-06-28 VITALS
WEIGHT: 300.05 LBS | HEART RATE: 69 BPM | DIASTOLIC BLOOD PRESSURE: 84 MMHG | HEIGHT: 65 IN | RESPIRATION RATE: 18 BRPM | TEMPERATURE: 96.4 F | SYSTOLIC BLOOD PRESSURE: 169 MMHG | BODY MASS INDEX: 49.99 KG/M2 | OXYGEN SATURATION: 97 %

## 2019-06-28 LAB
ANION GAP SERPL CALCULATED.3IONS-SCNC: 9 MMOL/L (ref 5–15)
BASOPHILS # BLD AUTO: 0.01 10*3/MM3 (ref 0–0.2)
BASOPHILS NFR BLD AUTO: 0.2 % (ref 0–1.5)
BUN BLD-MCNC: 12 MG/DL (ref 8–23)
BUN/CREAT SERPL: 12.5 (ref 7–25)
CALCIUM SPEC-SCNC: 8.1 MG/DL (ref 8.6–10.5)
CHLORIDE SERPL-SCNC: 108 MMOL/L (ref 98–107)
CO2 SERPL-SCNC: 26 MMOL/L (ref 22–29)
CREAT BLD-MCNC: 0.96 MG/DL (ref 0.76–1.27)
CRP SERPL-MCNC: 3.59 MG/DL (ref 0–0.5)
DEPRECATED RDW RBC AUTO: 48.3 FL (ref 37–54)
EOSINOPHIL # BLD AUTO: 0.14 10*3/MM3 (ref 0–0.4)
EOSINOPHIL NFR BLD AUTO: 2.4 % (ref 0.3–6.2)
ERYTHROCYTE [DISTWIDTH] IN BLOOD BY AUTOMATED COUNT: 15.4 % (ref 12.3–15.4)
GFR SERPL CREATININE-BSD FRML MDRD: 79 ML/MIN/1.73
GLUCOSE BLD-MCNC: 125 MG/DL (ref 65–99)
HCT VFR BLD AUTO: 30.1 % (ref 37.5–51)
HGB BLD-MCNC: 9.8 G/DL (ref 13–17.7)
IMM GRANULOCYTES # BLD AUTO: 0.03 10*3/MM3 (ref 0–0.05)
IMM GRANULOCYTES NFR BLD AUTO: 0.5 % (ref 0–0.5)
LYMPHOCYTES # BLD AUTO: 1.24 10*3/MM3 (ref 0.7–3.1)
LYMPHOCYTES NFR BLD AUTO: 21.1 % (ref 19.6–45.3)
MCH RBC QN AUTO: 29 PG (ref 26.6–33)
MCHC RBC AUTO-ENTMCNC: 32.6 G/DL (ref 31.5–35.7)
MCV RBC AUTO: 89.1 FL (ref 79–97)
MONOCYTES # BLD AUTO: 0.37 10*3/MM3 (ref 0.1–0.9)
MONOCYTES NFR BLD AUTO: 6.3 % (ref 5–12)
NEUTROPHILS # BLD AUTO: 4.08 10*3/MM3 (ref 1.7–7)
NEUTROPHILS NFR BLD AUTO: 69.5 % (ref 42.7–76)
NRBC BLD AUTO-RTO: 0 /100 WBC (ref 0–0.2)
PLATELET # BLD AUTO: 121 10*3/MM3 (ref 140–450)
PMV BLD AUTO: 9.4 FL (ref 6–12)
POTASSIUM BLD-SCNC: 4.1 MMOL/L (ref 3.5–5.2)
RBC # BLD AUTO: 3.38 10*6/MM3 (ref 4.14–5.8)
SODIUM BLD-SCNC: 143 MMOL/L (ref 136–145)
WBC NRBC COR # BLD: 5.87 10*3/MM3 (ref 3.4–10.8)

## 2019-06-28 PROCEDURE — 25010000002 AMPICILLIN PER 500 MG: Performed by: ORTHOPAEDIC SURGERY

## 2019-06-28 PROCEDURE — 85025 COMPLETE CBC W/AUTO DIFF WBC: CPT | Performed by: ORTHOPAEDIC SURGERY

## 2019-06-28 PROCEDURE — 80048 BASIC METABOLIC PNL TOTAL CA: CPT | Performed by: ORTHOPAEDIC SURGERY

## 2019-06-28 PROCEDURE — 86140 C-REACTIVE PROTEIN: CPT | Performed by: ORTHOPAEDIC SURGERY

## 2019-06-28 PROCEDURE — 99024 POSTOP FOLLOW-UP VISIT: CPT | Performed by: ORTHOPAEDIC SURGERY

## 2019-06-28 PROCEDURE — 97110 THERAPEUTIC EXERCISES: CPT

## 2019-06-28 PROCEDURE — 97530 THERAPEUTIC ACTIVITIES: CPT

## 2019-06-28 RX ORDER — OXYCODONE HYDROCHLORIDE 5 MG/1
5 TABLET ORAL EVERY 4 HOURS PRN
Qty: 30 TABLET | Refills: 0 | Status: ON HOLD | OUTPATIENT
Start: 2019-06-28 | End: 2019-08-08

## 2019-06-28 RX ADMIN — ACETAMINOPHEN 1000 MG: 500 TABLET ORAL at 13:57

## 2019-06-28 RX ADMIN — TAMSULOSIN HYDROCHLORIDE 0.4 MG: 0.4 CAPSULE ORAL at 10:17

## 2019-06-28 RX ADMIN — AMPICILLIN SODIUM 2 G: 2 INJECTION, POWDER, FOR SOLUTION INTRAVENOUS at 15:50

## 2019-06-28 RX ADMIN — AMPICILLIN SODIUM 2 G: 2 INJECTION, POWDER, FOR SOLUTION INTRAVENOUS at 10:22

## 2019-06-28 RX ADMIN — AMPICILLIN SODIUM 2 G: 2 INJECTION, POWDER, FOR SOLUTION INTRAVENOUS at 05:55

## 2019-06-28 RX ADMIN — BUSPIRONE HYDROCHLORIDE 5 MG: 5 TABLET ORAL at 10:16

## 2019-06-28 RX ADMIN — FERROUS SULFATE TAB EC 324 MG (65 MG FE EQUIVALENT) 324 MG: 324 (65 FE) TABLET DELAYED RESPONSE at 10:17

## 2019-06-28 RX ADMIN — REGULAR STRENGTH 325 MG: 325 TABLET ORAL at 10:17

## 2019-06-28 RX ADMIN — ACETAMINOPHEN 1000 MG: 500 TABLET ORAL at 10:17

## 2019-06-28 RX ADMIN — CARVEDILOL 3.12 MG: 3.12 TABLET, FILM COATED ORAL at 10:17

## 2019-06-28 RX ADMIN — FAMOTIDINE 40 MG: 40 TABLET ORAL at 10:17

## 2019-07-02 NOTE — DISCHARGE SUMMARY
ORTHOPEDIC DISCHARGE SUMMARY    NAME: Michael Sorto   PHYSICIAN: Rufino Lu MD  : 1955  MRN: [id number]    ADMITTED: 2019   DISCHARGED: 2019    ADMISSION DIAGNOSES:    Pyogenic arthritis of right knee joint (CMS/HCC)      DISCHARGE DIAGNOSES:     Pyogenic arthritis of right knee joint (CMS/formerly [facility])      SERVICE: ORTHOPEDIC: Attending, Rufino Lu MD    CONSULTS:     PROCEDURES: Procedure(s):  REMOVAL OF TOTAL KNEE COMPONENTS RIGHT KNEE WITH INSERTION OF CEMENT ANTIBIOTIC SPACER    LABS:   Lab Results (last 24 hours)     Procedure Component Value Units Date/Time    Basic Metabolic Panel [[id number]]  (Abnormal) Collected:  19 124    Specimen:  Blood Updated:  19 1310     Glucose 125 mg/dL      BUN 12 mg/dL      Creatinine 0.96 mg/dL      Sodium 143 mmol/L      Potassium 4.1 mmol/L      Chloride 108 mmol/L      CO2 26.0 mmol/L      Calcium 8.1 mg/dL      eGFR Non African Amer 79 mL/min/1.73      BUN/Creatinine Ratio 12.5     Anion Gap 9.0 mmol/L     Narrative:       GFR Normal >60  Chronic Kidney Disease <60  Kidney Failure <15    C-reactive Protein [[id number]]  (Abnormal) Collected:  19 124    Specimen:  Blood Updated:  19 1309     C-Reactive Protein 3.59 mg/dL     CBC & Differential [[id number]] Collected:  19    Specimen:  Blood Updated:  19 124    Narrative:       The following orders were created for panel order CBC & Differential.  Procedure                               Abnormality         Status                     ---------                               -----------         ------                     CBC Auto Differential[[id number]]        Abnormal            Final result                 Please view results for these tests on the individual orders.    CBC Auto Differential [[id number]]  (Abnormal) Collected:  19 124    Specimen:  Blood Updated:  19     WBC 5.87 10*3/mm3      RBC 3.38 10*6/mm3      Hemoglobin 9.8 g/dL       Hematocrit 30.1 %      MCV 89.1 fL      MCH 29.0 pg      MCHC 32.6 g/dL      RDW 15.4 %      RDW-SD 48.3 fl      MPV 9.4 fL      Platelets 121 10*3/mm3      Neutrophil % 69.5 %      Lymphocyte % 21.1 %      Monocyte % 6.3 %      Eosinophil % 2.4 %      Basophil % 0.2 %      Immature Grans % 0.5 %      Neutrophils, Absolute 4.08 10*3/mm3      Lymphocytes, Absolute 1.24 10*3/mm3      Monocytes, Absolute 0.37 10*3/mm3      Eosinophils, Absolute 0.14 10*3/mm3      Basophils, Absolute 0.01 10*3/mm3      Immature Grans, Absolute 0.03 10*3/mm3      nRBC 0.0 /100 WBC             SUMMARY: 64-year-old with 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  He is for admission to the hospital after having the knee removed and packed with antibiotic spacers antibiotic beads over a drain.  He was given a PICC line and cultures were awaited that grew out Enterococcus faecalis.  It was sensitive to ampicillin and the antibiotics was changed and this is much less toxic.  DVT prophylaxis was done with aspirin.  The drain was removed at 3 days.  Arrangements were made to discharge planning for him to go to a nursing home for antibiotic treatment to plan for 6 weeks.  His blood work was monitored in the hospital.  Star will stay in for approximately 2 weeks stay in a knee immobilizer specific instructions not to weight-bear on the right leg he is an amputee on the other leg he will essentially be transferred.  Would like to see him in the interim to check his progress.    He was in the hospital somewhat of an extended time is to have difficulty finding placement as the patient is currently awaiting a potential legal action in October as it is my understanding from the       DISPOSITION: Skilled facility      DISCHARGE MEDICATIONS     Discharge Medications      New Medications      Instructions Start Date   ampicillin   2 g, Intravenous, Every 6 Hours      aspirin 325 MG EC tablet   325 mg, Oral, Every 12 Hours Scheduled       oxyCODONE 5 MG immediate release tablet  Commonly known as:  ROXICODONE   5 mg, Oral, Every 4 Hours PRN         Continue These Medications      Instructions Start Date   busPIRone 5 MG tablet  Commonly known as:  BUSPAR   5 mg, Oral, Every 12 Hours Scheduled      carvedilol 3.125 MG tablet  Commonly known as:  COREG   3.125 mg, Oral, Every 12 Hours Scheduled      cyclobenzaprine 10 MG tablet  Commonly known as:  FLEXERIL   10 mg, Oral, 3 Times Daily PRN      diclofenac 75 MG EC tablet  Commonly known as:  VOLTAREN   75 mg, Oral, 2 Times Daily      ROLAIDS PO   Oral, As Needed      traMADol 50 MG tablet  Commonly known as:  ULTRAM   50 mg, Oral, Every 6 Hours PRN             INSTRUCTIONS:  Activity: I physical therapy for transfers  Diet: Regular  Condition: Stable  Special instructions: Patient instructed to call office or call physician through hospital  [phone].    FOLLOW UP:    Contact information for follow-up providers     Rossy Arias APRN .    Specialty:  Certified Clinical Nurse Specialist  Contact information:  [address] [address]  [phone]                   Contact information for after-discharge care     [place]     [facility] [place] [facility] [place] [facility] .    Service:  Skilled Nursing  Contact information:  [address] [place] [address]  [phone]                                 Rufino Lu MD  07/02/19  10:54 AM    Time: 30 minutes

## 2019-07-09 ENCOUNTER — TELEPHONE (OUTPATIENT)
Dept: ORTHOPEDIC SURGERY | Facility: CLINIC | Age: 64
End: 2019-07-09

## 2019-07-09 NOTE — TELEPHONE ENCOUNTER
CARMELO        I WANTED TO LET YOU KNOW THAT THE Pt HAD HIS APPT CANCELED BECAUSE THEY HAD GOTTEN HIM IN WITH A DR CLOSER TO HOME TO BE TREATED...

## 2019-07-16 ENCOUNTER — TELEPHONE (OUTPATIENT)
Dept: ORTHOPEDIC SURGERY | Facility: CLINIC | Age: 64
End: 2019-07-16

## 2019-07-16 NOTE — TELEPHONE ENCOUNTER
Shell Espinal with [facility] called about this patient ([phone])    Patient's PICC line came out and she wants to know if patient can be changed to something by mouth instead of re-inserting the PICC line.     She states patient goes home Friday and will need something by mouth by then anyway.      -Cathleen

## 2019-07-17 NOTE — TELEPHONE ENCOUNTER
Spoke with Shell at [facility] and let her know that the PICC line needs to be placed today and that the antibiotics need to be resumed as soon as possible. Also told her that this is critical to the patient's health that he have 6 weeks of antibiotics IV so he does not lose the leg as well. I told her that his date of surgery was on 6/18/2019 so he needs 6 full weeks of the IV therapy. She states that he will not be able to be discharged with a PICC line and I stated that he needed to stay until he had the full 6 weeks. She states that he does not know where home will be once he is discharged and she laughed. I just restated the importance of the PICC line placement and that it is done ASAP per Dr. Lu. I also tried to call the patient himself to let him know that we have called and spoke with the nursing staff but there was no answer.

## 2019-08-07 ENCOUNTER — APPOINTMENT (OUTPATIENT)
Dept: CT IMAGING | Facility: HOSPITAL | Age: 64
End: 2019-08-07

## 2019-08-07 ENCOUNTER — HOSPITAL ENCOUNTER (INPATIENT)
Facility: HOSPITAL | Age: 64
LOS: 3 days | Discharge: HOME | End: 2019-08-10
Attending: NEUROLOGICAL SURGERY | Admitting: NEUROLOGICAL SURGERY

## 2019-08-07 DIAGNOSIS — Z74.09 DECREASED FUNCTIONAL MOBILITY AND ENDURANCE: ICD-10-CM

## 2019-08-07 DIAGNOSIS — I61.0 NONTRAUMATIC SUBCORTICAL HEMORRHAGE OF RIGHT CEREBRAL HEMISPHERE (HCC): ICD-10-CM

## 2019-08-07 DIAGNOSIS — Z78.9 DECREASED ACTIVITIES OF DAILY LIVING (ADL): ICD-10-CM

## 2019-08-07 DIAGNOSIS — R41.89 IMPAIRED COGNITION: Primary | ICD-10-CM

## 2019-08-07 PROBLEM — E66.01 MORBID OBESITY (HCC): Status: ACTIVE | Noted: 2019-08-07

## 2019-08-07 PROBLEM — I61.9 NONTRAUMATIC INTRACEREBRAL HEMORRHAGE (HCC): Status: ACTIVE | Noted: 2019-08-07

## 2019-08-07 PROBLEM — I10 REACTIVE HYPERTENSION: Status: ACTIVE | Noted: 2019-08-07

## 2019-08-07 PROBLEM — R11.0 NAUSEA: Status: ACTIVE | Noted: 2019-08-07

## 2019-08-07 PROBLEM — L03.116 CELLULITIS OF LEFT LOWER EXTREMITY: Status: ACTIVE | Noted: 2019-08-07

## 2019-08-07 PROBLEM — I61.9 INTRACEREBRAL HEMORRHAGE (HCC): Status: ACTIVE | Noted: 2019-08-07

## 2019-08-07 PROBLEM — L03.90 CELLULITIS: Status: ACTIVE | Noted: 2019-08-07

## 2019-08-07 PROBLEM — R51.9 HEADACHE: Status: ACTIVE | Noted: 2019-08-07

## 2019-08-07 LAB
ALBUMIN SERPL-MCNC: 4.4 G/DL (ref 3.5–5)
ALBUMIN/GLOB SERPL: 1.5 G/DL (ref 1.1–2.5)
ALP SERPL-CCNC: 160 U/L (ref 24–120)
ALT SERPL W P-5'-P-CCNC: 23 U/L (ref 0–54)
ANION GAP SERPL CALCULATED.3IONS-SCNC: 7 MMOL/L (ref 4–13)
APTT PPP: 31.6 SECONDS (ref 24.1–35)
AST SERPL-CCNC: 21 U/L (ref 7–45)
BASOPHILS # BLD AUTO: 0.01 10*3/MM3 (ref 0–0.2)
BASOPHILS NFR BLD AUTO: 0.2 % (ref 0–1.5)
BILIRUB SERPL-MCNC: 1.3 MG/DL (ref 0.1–1)
BUN BLD-MCNC: 13 MG/DL (ref 5–21)
BUN/CREAT SERPL: 16.5 (ref 7–25)
CALCIUM SPEC-SCNC: 9.4 MG/DL (ref 8.4–10.4)
CHLORIDE SERPL-SCNC: 103 MMOL/L (ref 98–110)
CO2 SERPL-SCNC: 29 MMOL/L (ref 24–31)
CREAT BLD-MCNC: 0.79 MG/DL (ref 0.5–1.4)
DEPRECATED RDW RBC AUTO: 49.2 FL (ref 37–54)
EOSINOPHIL # BLD AUTO: 0.02 10*3/MM3 (ref 0–0.4)
EOSINOPHIL NFR BLD AUTO: 0.3 % (ref 0.3–6.2)
ERYTHROCYTE [DISTWIDTH] IN BLOOD BY AUTOMATED COUNT: 15 % (ref 12.3–15.4)
GFR SERPL CREATININE-BSD FRML MDRD: 99 ML/MIN/1.73
GLOBULIN UR ELPH-MCNC: 2.9 GM/DL
GLUCOSE BLD-MCNC: 112 MG/DL (ref 70–100)
HCT VFR BLD AUTO: 41 % (ref 37.5–51)
HGB BLD-MCNC: 13.5 G/DL (ref 13–17.7)
INR PPP: 1.08 (ref 0.91–1.09)
LYMPHOCYTES # BLD AUTO: 0.65 10*3/MM3 (ref 0.7–3.1)
LYMPHOCYTES NFR BLD AUTO: 10 % (ref 19.6–45.3)
MCH RBC QN AUTO: 29.6 PG (ref 26.6–33)
MCHC RBC AUTO-ENTMCNC: 32.9 G/DL (ref 31.5–35.7)
MCV RBC AUTO: 89.9 FL (ref 79–97)
MONOCYTES # BLD AUTO: 0.38 10*3/MM3 (ref 0.1–0.9)
MONOCYTES NFR BLD AUTO: 5.9 % (ref 5–12)
NEUTROPHILS # BLD AUTO: 5.4 10*3/MM3 (ref 1.7–7)
NEUTROPHILS NFR BLD AUTO: 83.3 % (ref 42.7–76)
OSMOLALITY SERPL: 289 MOSM/KG (ref 289–308)
PLATELET # BLD AUTO: 131 10*3/MM3 (ref 140–450)
PMV BLD AUTO: 10 FL (ref 6–12)
POTASSIUM BLD-SCNC: 4 MMOL/L (ref 3.5–5.3)
POTASSIUM BLD-SCNC: 4 MMOL/L (ref 3.5–5.3)
PROT SERPL-MCNC: 7.3 G/DL (ref 6.3–8.7)
PROTHROMBIN TIME: 14.3 SECONDS (ref 11.9–14.6)
RBC # BLD AUTO: 4.56 10*6/MM3 (ref 4.14–5.8)
SODIUM BLD-SCNC: 139 MMOL/L (ref 135–145)
WBC NRBC COR # BLD: 6.48 10*3/MM3 (ref 3.4–10.8)

## 2019-08-07 PROCEDURE — C1751 CATH, INF, PER/CENT/MIDLINE: HCPCS

## 2019-08-07 PROCEDURE — 82962 GLUCOSE BLOOD TEST: CPT

## 2019-08-07 PROCEDURE — 70496 CT ANGIOGRAPHY HEAD: CPT

## 2019-08-07 PROCEDURE — 0 IOPAMIDOL PER 1 ML: Performed by: NEUROLOGICAL SURGERY

## 2019-08-07 PROCEDURE — 80053 COMPREHEN METABOLIC PANEL: CPT | Performed by: NURSE PRACTITIONER

## 2019-08-07 PROCEDURE — 84132 ASSAY OF SERUM POTASSIUM: CPT | Performed by: NURSE PRACTITIONER

## 2019-08-07 PROCEDURE — 85610 PROTHROMBIN TIME: CPT | Performed by: NURSE PRACTITIONER

## 2019-08-07 PROCEDURE — 02HV33Z INSERTION OF INFUSION DEVICE INTO SUPERIOR VENA CAVA, PERCUTANEOUS APPROACH: ICD-10-PCS | Performed by: NEUROLOGICAL SURGERY

## 2019-08-07 PROCEDURE — 83930 ASSAY OF BLOOD OSMOLALITY: CPT | Performed by: NURSE PRACTITIONER

## 2019-08-07 PROCEDURE — 85025 COMPLETE CBC W/AUTO DIFF WBC: CPT | Performed by: NURSE PRACTITIONER

## 2019-08-07 PROCEDURE — 99222 1ST HOSP IP/OBS MODERATE 55: CPT | Performed by: NURSE PRACTITIONER

## 2019-08-07 PROCEDURE — 94799 UNLISTED PULMONARY SVC/PX: CPT

## 2019-08-07 PROCEDURE — 70450 CT HEAD/BRAIN W/O DYE: CPT

## 2019-08-07 PROCEDURE — 85730 THROMBOPLASTIN TIME PARTIAL: CPT | Performed by: NURSE PRACTITIONER

## 2019-08-07 RX ORDER — LIDOCAINE HYDROCHLORIDE 10 MG/ML
1 INJECTION, SOLUTION EPIDURAL; INFILTRATION; INTRACAUDAL; PERINEURAL ONCE
Status: COMPLETED | OUTPATIENT
Start: 2019-08-07 | End: 2019-08-07

## 2019-08-07 RX ORDER — CYCLOBENZAPRINE HCL 10 MG
10 TABLET ORAL 3 TIMES DAILY PRN
Status: DISCONTINUED | OUTPATIENT
Start: 2019-08-07 | End: 2019-08-10 | Stop reason: HOSPADM

## 2019-08-07 RX ORDER — SODIUM CHLORIDE 0.9 % (FLUSH) 0.9 %
3 SYRINGE (ML) INJECTION EVERY 12 HOURS SCHEDULED
Status: DISCONTINUED | OUTPATIENT
Start: 2019-08-07 | End: 2019-08-10 | Stop reason: HOSPADM

## 2019-08-07 RX ORDER — ONDANSETRON 2 MG/ML
4 INJECTION INTRAMUSCULAR; INTRAVENOUS EVERY 6 HOURS PRN
Status: DISCONTINUED | OUTPATIENT
Start: 2019-08-07 | End: 2019-08-10 | Stop reason: HOSPADM

## 2019-08-07 RX ORDER — HYDRALAZINE HYDROCHLORIDE 20 MG/ML
5 INJECTION INTRAMUSCULAR; INTRAVENOUS
Status: DISCONTINUED | OUTPATIENT
Start: 2019-08-07 | End: 2019-08-10 | Stop reason: HOSPADM

## 2019-08-07 RX ORDER — ACETAMINOPHEN 325 MG/1
650 TABLET ORAL EVERY 4 HOURS PRN
Status: DISCONTINUED | OUTPATIENT
Start: 2019-08-07 | End: 2019-08-10 | Stop reason: HOSPADM

## 2019-08-07 RX ORDER — CARVEDILOL 3.12 MG/1
3.12 TABLET ORAL EVERY 12 HOURS SCHEDULED
Status: DISCONTINUED | OUTPATIENT
Start: 2019-08-07 | End: 2019-08-08

## 2019-08-07 RX ORDER — DOCUSATE SODIUM 100 MG/1
100 CAPSULE, LIQUID FILLED ORAL 2 TIMES DAILY PRN
Status: DISCONTINUED | OUTPATIENT
Start: 2019-08-07 | End: 2019-08-10 | Stop reason: HOSPADM

## 2019-08-07 RX ORDER — LABETALOL HYDROCHLORIDE 5 MG/ML
10 INJECTION, SOLUTION INTRAVENOUS ONCE
Status: DISCONTINUED | OUTPATIENT
Start: 2019-08-07 | End: 2019-08-10 | Stop reason: HOSPADM

## 2019-08-07 RX ORDER — OXYCODONE HYDROCHLORIDE 5 MG/1
5 TABLET ORAL EVERY 4 HOURS PRN
Status: DISCONTINUED | OUTPATIENT
Start: 2019-08-07 | End: 2019-08-10 | Stop reason: HOSPADM

## 2019-08-07 RX ORDER — LEVETIRACETAM 500 MG/1
500 TABLET ORAL 2 TIMES DAILY
Status: DISCONTINUED | OUTPATIENT
Start: 2019-08-07 | End: 2019-08-10 | Stop reason: HOSPADM

## 2019-08-07 RX ORDER — AMOXICILLIN 500 MG/1
500 CAPSULE ORAL EVERY 8 HOURS SCHEDULED
Status: DISCONTINUED | OUTPATIENT
Start: 2019-08-07 | End: 2019-08-10 | Stop reason: HOSPADM

## 2019-08-07 RX ORDER — BUSPIRONE HYDROCHLORIDE 5 MG/1
5 TABLET ORAL EVERY 12 HOURS SCHEDULED
Status: DISCONTINUED | OUTPATIENT
Start: 2019-08-07 | End: 2019-08-10 | Stop reason: HOSPADM

## 2019-08-07 RX ORDER — OXYCODONE HYDROCHLORIDE AND ACETAMINOPHEN 5; 325 MG/1; MG/1
1 TABLET ORAL EVERY 6 HOURS PRN
Status: DISCONTINUED | OUTPATIENT
Start: 2019-08-07 | End: 2019-08-10 | Stop reason: HOSPADM

## 2019-08-07 RX ORDER — ONDANSETRON 4 MG/1
4 TABLET, FILM COATED ORAL EVERY 6 HOURS PRN
Status: DISCONTINUED | OUTPATIENT
Start: 2019-08-07 | End: 2019-08-10 | Stop reason: HOSPADM

## 2019-08-07 RX ORDER — SODIUM CHLORIDE 0.9 % (FLUSH) 0.9 %
3-10 SYRINGE (ML) INJECTION AS NEEDED
Status: DISCONTINUED | OUTPATIENT
Start: 2019-08-07 | End: 2019-08-10 | Stop reason: HOSPADM

## 2019-08-07 RX ORDER — 3% SODIUM CHLORIDE 3 G/100ML
25 INJECTION, SOLUTION INTRAVENOUS CONTINUOUS
Status: DISPENSED | OUTPATIENT
Start: 2019-08-07 | End: 2019-08-08

## 2019-08-07 RX ORDER — ACETAMINOPHEN 650 MG/1
650 SUPPOSITORY RECTAL EVERY 4 HOURS PRN
Status: DISCONTINUED | OUTPATIENT
Start: 2019-08-07 | End: 2019-08-10 | Stop reason: HOSPADM

## 2019-08-07 RX ORDER — DEXTROSE MONOHYDRATE 50 MG/ML
6 INJECTION, SOLUTION INTRAVENOUS CONTINUOUS PRN
Status: DISCONTINUED | OUTPATIENT
Start: 2019-08-07 | End: 2019-08-09

## 2019-08-07 RX ADMIN — BUSPIRONE HYDROCHLORIDE 5 MG: 5 TABLET ORAL at 21:09

## 2019-08-07 RX ADMIN — AMOXICILLIN 500 MG: 500 CAPSULE ORAL at 21:09

## 2019-08-07 RX ADMIN — SODIUM CHLORIDE 25 ML/HR: 3 INJECTION, SOLUTION INTRAVENOUS at 18:17

## 2019-08-07 RX ADMIN — LEVETIRACETAM 500 MG: 500 TABLET, FILM COATED ORAL at 21:09

## 2019-08-07 RX ADMIN — OXYCODONE HYDROCHLORIDE AND ACETAMINOPHEN 1 TABLET: 5; 325 TABLET ORAL at 21:23

## 2019-08-07 RX ADMIN — SODIUM CHLORIDE, PRESERVATIVE FREE 3 ML: 5 INJECTION INTRAVENOUS at 21:11

## 2019-08-07 RX ADMIN — IOPAMIDOL 81.3 ML: 755 INJECTION, SOLUTION INTRAVENOUS at 18:53

## 2019-08-07 RX ADMIN — SODIUM CHLORIDE 5 MG/HR: 9 INJECTION, SOLUTION INTRAVENOUS at 13:12

## 2019-08-07 RX ADMIN — SODIUM CHLORIDE 12.5 MG/HR: 9 INJECTION, SOLUTION INTRAVENOUS at 16:13

## 2019-08-07 RX ADMIN — LIDOCAINE HYDROCHLORIDE 1 ML: 10 INJECTION, SOLUTION EPIDURAL; INFILTRATION; INTRACAUDAL; PERINEURAL at 18:18

## 2019-08-07 RX ADMIN — CARVEDILOL 3.12 MG: 3.12 TABLET, FILM COATED ORAL at 21:09

## 2019-08-07 RX ADMIN — SODIUM CHLORIDE 5 MG/HR: 9 INJECTION, SOLUTION INTRAVENOUS at 20:20

## 2019-08-07 NOTE — H&P
NEUROSURGERY INITIAL HOSPITAL ENCOUNTER    Assessment/Plan:   Michael Sorto is a 64 y.o. male.  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  He presents with a new problem of worsening headaches and nausea.  Physical exam findings of alert and oriented, no pronator drift or dysmetria, follow simple commands, GCS 15; edema, erythema, and warmth to right lower extremity.  Their imaging shows an acute parafalcine, left tentorial and left convexity subdural hemorrhage, left occipital intraparenchymal hemorrhage with surrounding edema, left subdural hemorrhage with a 4 mm left to right midline shift, and an area in the right frontal parietal region which appears to reflect chronic ischemia.    Differential Diagnosis:   Intracerebral Hemorrhage: HTN, anticoagulation, trauma, neoplasm, vascular malformation, amyloid angiopathy, idiopathic.     ICH Score    Volume: 1 Point (ICH volume =/>30 cm3)  Age: 0 Points (Age < 80 years)  GCS: 0 Points (GCS 13 to 15)  Location: 0 Points (Infratentorial Origin - No )  Intraventricular Extension: 0 Points (Intraventricular Extension Absent)  Total = 1    ICH Score Mortality Rate   0 0%   1 13%   2 26%   3 72%   4 94%   5 100%   6* 100%   90 DAY MORTALITY RATE    References:  Stroke. 2001 Apr;32(4):891-7.  Neurocrit Care. 2004;1(1):53-60.    Recommendations:  Admit to ICU for every hour neuro checks  CTA head  SBP < 140.  Cardene gtt  Repeat head CT in 6 hours  Keppra 500mg BID  Hypertonic solution with a sodium goal of 145-150 mg/dL; osmolarity < 320  Sodium, glucose, and osmolarity's every 6 hours  NPO with Speech evaluation    I discussed the patients findings and my recommendations with patient, nursing staff and consulting provider    Thank you very much for this interesting consult.     Level of Risk: Moderate  due to: acute illness with sytemic symptoms  MDM: Moderate Complexity  Mod = 12084, High=99223  ___________________________________________________________________    Reason for consult intracranial hemorrhage    Chief Complaint: Headache and nausea    HPI: Michael Sorto is a 64 y.o. 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  He presents with a new problem of worsening headaches and nausea.  Gradual and progressive onset over the past week with intermittent headaches, blurred vision, loss of depth perception, nausea, and vomiting.  For continued headaches, Mr. Sorto presented to [facility] ED this a.m. where he was found to have an acute intracranial hemorrhage.  He denies dizziness, diplopia, numbness or tingling, unilateral weakness, or seizure-like activity.  He has since been transported to [facility] for further evaluation and care of ICH per neurosurgery.    Review of Systems   Constitutional: Negative.    Eyes: Negative.    Respiratory: Negative.    Cardiovascular: Negative.    Gastrointestinal: Positive for nausea and vomiting.   Endocrine: Negative.    Genitourinary: Negative.    Musculoskeletal: Negative.    Skin: Positive for color change and wound.   Allergic/Immunologic: Negative.    Neurological: Positive for headaches. Negative for dizziness, tremors, seizures, syncope, facial asymmetry, speech difficulty and weakness.   Hematological: Negative.    Psychiatric/Behavioral: Negative.    All other systems reviewed and are negative.     Past Medical History:  has a past medical history of Cellulitis of right lower extremity, Chronic pain syndrome, CVA (cerebral vascular accident) (CMS/[facility]) (2011), Depressive disorder, GERD (gastroesophageal reflux disease), Hypertension,  Injury of lower leg, Kidney stone, Morbid obesity (CMS/HCC), and Right knee pain.    Past Surgical History:  has a past surgical history that includes Replacement total knee (Right, 2011); Amputation (Left, 2010); Aortic valve replacement (2011); Tonsillectomy; Appendectomy; Cardiac catheterization; and Total knee  prosthesis removal w/ spacer insertion (Right, 6/18/2019).    Family History: family history is not on file.    Social History:  reports that he has quit smoking. He has quit using smokeless tobacco. He reports that he does not drink alcohol or use drugs.    Allergies: Patient has no known allergies.    Home Medications:   Current Facility-Administered Medications:   •  hydrALAZINE (APRESOLINE) injection 5 mg, 5 mg, Intravenous, Q15 Min PRN, Silvestre Littlejohn MD  •  labetalol (NORMODYNE,TRANDATE) injection 10 mg, 10 mg, Intravenous, Once, Silvestre Littlejohn MD  •  niCARdipine (CARDENE) 25 mg/250 mL (0.1 mg/mL) 0.9% NS infusion, 5-15 mg/hr, Intravenous, Titrated, Silvestre Littlejohn MD, Last Rate: 100 mL/hr at 08/07/19 1502, 10 mg/hr at 08/07/19 1502    Medications: Scheduled Meds:  labetalol 10 mg Intravenous Once     Continuous Infusions:  niCARdipine 5-15 mg/hr Last Rate: 10 mg/hr (08/07/19 1502)     PRN Meds:.•  hydrALAZINE    Vital Signs  Temp:  [98.7 °F (37.1 °C)] 98.7 °F (37.1 °C)  Heart Rate:  [66-83] 72  BP: (147-194)/() 180/110    Physical Exam  Physical Exam   Constitutional: He is oriented to person, place, and time. He appears well-developed and well-nourished.  Non-toxic appearance. He does not have a sickly appearance. He does not appear ill. No distress.   BMI 49.92   HENT:   Head: Normocephalic and atraumatic.   Right Ear: Hearing normal.   Left Ear: Hearing normal.   Mouth/Throat: Mucous membranes are normal.   Eyes: Conjunctivae and EOM are normal. Pupils are equal, round, and reactive to light.   Neck: Trachea normal and full passive range of motion without pain.  Neck supple.   Cardiovascular: Normal rate and regular rhythm.   Pulmonary/Chest: Effort normal. No accessory muscle usage. No apnea, no tachypnea and no bradypnea. No respiratory distress.   Abdominal: Soft. Normal appearance.   Musculoskeletal:   BKA noted to left lower extremity.   Neurological: He is alert and oriented to person, place, and time.   Skin: Skin is warm, dry and intact.   Edema, erythema, and warmth to right lower extremity.   Psychiatric: He has a normal mood and affect. His speech is normal and behavior is normal.   Nursing note and vitals reviewed.      Neurologic Exam     Mental Status   Oriented to person, place, and time.   Attention: normal. Concentration: normal.   Speech: speech is normal   Level of consciousness: alert  Able to name object.     Cranial Nerves     CN II   Visual fields full to confrontation.     CN III, IV, VI   Pupils are equal, round, and reactive to light.  Extraocular motions are normal.     CN V   Facial sensation intact.     CN VII   Facial expression full, symmetric.     CN VIII   CN VIII normal.     CN IX, X   CN IX normal.     CN XI   CN XI normal.     Motor Exam   Muscle bulk: normal  Overall muscle tone: normal  Right arm tone: normal  Left arm tone: normal  Right arm pronator drift: absent  Left arm pronator drift: absent  Right leg tone: normal  Left leg tone: normal    Strength   Right deltoid: 5/5  Left deltoid: 5/5  Right biceps: 5/5  Left biceps: 5/5  Right triceps: 5/5  Left triceps: 5/5  Right wrist extension: 5/5  Left wrist extension: 5/5  Right iliopsoas: 4/5  Left iliopsoas: 5/5  Right quadriceps: 4/5  Right anterior tibial: 4/5  Right posterior tibial: 4/5No dysmetria     Sensory Exam   Right arm light touch: normal  Left arm light touch: normal  Right leg light touch: normal  Left leg light touch: normal    Gait, Coordination, and Reflexes     Tremor   Resting tremor: absent  Intention tremor: absent  Action tremor: absent    Reflexes   Right  plantar: normal  Right Mcbride: absent  Left Mcbride: absent  Right ankle clonus: absent  Right pendular knee jerk: absent  Left pendular knee jerk: absent      Results Review:   Independent review and interpretation of imaging  Imaging Results (last 24 hours)     Procedure Component Value Units Date/Time    CT Head Without Contrast [[id number]] Collected:  08/07/19 1441     Updated:  08/07/19 1455    Narrative:       CT HEAD WO CONTRAST- 8/7/2019 1:48 PM CDT     HISTORY: Intracranial hemorrhage       DOSE LENGTH PRODUCT: 542 mGy cm. Automated exposure control was also  utilized to decrease patient radiation dose.     Technique:   Axial CT of the brain without IV contrast. Sagittal and coronal  reformations are also provided for review. Soft tissue and bone kernels  are available for interpretation.     Comparison: None.     Findings:      Approximately 3.6 x 4.3 x 2.5 cm left occipital intraparenchymal  hematoma (series 7 image image 18 and series 3-image 17). There is a  collar of surrounding mild to moderate vasogenic edema. There is an area  of low-attenuation in the right frontoparietal region which appears to  reflect an area of subacute to chronic ischemia. Acute parafalcine, left  tentorial and left convexity subdural hemorrhage is also identified. The  left cerebral convexity components of the subdural hemorrhage measures  up to 0.6 cm in greatest thickness. Mass effect from this hemorrhage  effaces the left lateral ventricle and there is an approximately 4 mm  left-to-right midline shift. The third ventricle is also effaced. No  trapping of the right lateral ventricle. The basal cisterns are  symmetric. No subarachnoid hemorrhage identified. No calvarial fracture.     Small retention cyst in the left sphenoid sinus. The paranasal sinuses  are otherwise clear. The mastoids are clear.       Impression:       Impression:    1. Left occipital intraparenchymal hematoma measuring up to 4.3 cm.  2. Acute left  cerebral convexity, parafalcine and left tentorial  subdural hemorrhage. This measures up to 0.6 cm in thickness along the  left frontal convexity.  3. Associated 4 mm left-to-right midline shift. No trapping of the right  lateral ventricle.  This report was finalized on 08/07/2019 14:52 by Dr Clayton Samayoa, .        MRI brain:  MRI spine:   CT Head:        CT c-spine:  CT t-spine:  CT l-spine:  X-ray:    I reviewed the patient's new clinical results.  Lab Results (last 24 hours)     ** No results found for the last 24 hours. **        Sabino De León, APRN

## 2019-08-07 NOTE — CONSULTS
Pt had 5 [place] dual lumen PICC placed in right basilic vein. Pt tolerated procedure well. 43 cm of catheter internal and 1 cm external. Tip confirmation by 3cg. All lumens flush and draw well. Sterile dressing applied.

## 2019-08-07 NOTE — CONSULTS
[facility] Medicine Consult  Consults    Date of Admission: 8/7/2019  Date of Consult: 08/07/19    Primary Care Physician: Rossy Arias APRN  Referring Physician: Dr. Littlejohn  Chief Complaint/Reason for Consultation: Cellulitis of right lower extremity    Subjective   History of Present Illness  This 64-year-old white male was admitted with a chief complaint of headache and nausea.  The patient states that his symptoms began roughly 1 week ago and started with intermittent headaches, blurred vision, difficulty with depth perception as well as nausea and vomiting.  He presented to [facility] emergency department and evaluation there was evidence of acute, spontaneous intracranial hemorrhage the patient was subsequently transported to [facility] for neurosurgical care and management.The hospitalist service was asked to consult regarding a possible cellulitis of the right lower extremity inferior to the right knee.    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  In June of this year he was admitted to the hospital and had the prosthesis removed and packed with antibiotic spacers and antibiotic beads over drain.  Cultures grew out Enterococcus faecalis sensitive to ampicillin and the patient was placed on IV ampicillin therapy for 6 weeks.  The drain was removed at 3 days postop.  The patient has been in a knee immobilizer since surgery.  He completed his antibiotic course 5 days ago.  At the time of discharge from the hospital the patient was to be nonweightbearing on the right lower extremity.  At the time of admission to our facility there was concern for possible cellulitis of the right lower extremity inferior to the knee.  There was some mild erythema noted.  The patient is unaware if this erythema is chronic or has recently developed.  White blood cell count is within normal  limits.  Patient is afebrile without discomfort in the region of interest.  I suspect the area of erythema is a chronic finding.  Out of an abundance of caution the patient will be placed on amoxicillin orally for empiric treatment in the area of mild erythema will be outlined with a marker.        Review of Systems   Constitutional: Positive for activity change.   Eyes: Negative.    Respiratory: Negative.    Cardiovascular: Negative.    Gastrointestinal: Positive for nausea.   Endocrine: Negative.    Genitourinary: Negative.    Musculoskeletal: Negative.    Skin: Negative.    Allergic/Immunologic: Negative.    Neurological: Positive for headaches.   Hematological: Negative.    Psychiatric/Behavioral: Negative.       Otherwise complete ROS is negative except as mentioned above.    Past Medical History:   Past Medical History:   Diagnosis Date   • Cellulitis of right lower extremity    • Chronic pain syndrome    • CVA (cerebral vascular accident) (CMS/[facility]) 2011   • Depressive disorder    • GERD (gastroesophageal reflux disease)    • Hypertension    • Injury of lower leg    • Kidney stone    • Morbid obesity (CMS/[facility])    • Painful total knee replacement, initial encounter (CMS/HCC) 3/6/2019   • Pyogenic arthritis of right knee joint (CMS/HCC) 3/6/2019   • Right knee pain      Past Surgical History:  Past Surgical History:   Procedure Laterality Date   • AMPUTATION Left 2010   • AORTIC VALVE REPAIR/REPLACEMENT  2011   • APPENDECTOMY     • CARDIAC CATHETERIZATION     • REPLACEMENT TOTAL KNEE Right 2011   • TONSILLECTOMY     • TOTAL KNEE  PROSTHESIS REMOVAL W/ SPACER INSERTION Right 6/18/2019    Procedure: REMOVAL OF TOTAL KNEE COMPONENTS RIGHT KNEE WITH INSERTION OF CEMENT ANTIBIOTIC SPACER;  Surgeon: Rufino Lu MD;  Location: [facility];  Service: Orthopedics     Social History:  reports that he has quit smoking. He has quit using smokeless tobacco. He reports that he does not drink alcohol or use  drugs.    Family History: family history is not on file.     Allergies: No Known Allergies  Medications: Scheduled Meds:    amoxicillin 500 mg Oral Q8H   busPIRone 5 mg Oral Q12H   carvedilol 3.125 mg Oral Q12H   labetalol 10 mg Intravenous Once   levETIRAcetam 500 mg Oral BID   sodium chloride 3 mL Intravenous Q12H     Continuous Infusions:    dextrose 6 mL/kg (Ideal)    niCARdipine 5-15 mg/hr Last Rate: 15 mg/hr (08/07/19 1630)   sodium chloride 25 mL/hr      PRN Meds:.•  acetaminophen **OR** acetaminophen  •  cyclobenzaprine  •  dextrose  •  docusate sodium  •  hydrALAZINE  •  ondansetron **OR** ondansetron  •  oxyCODONE  •  oxyCODONE-acetaminophen  •  sodium chloride    Objective   Objective    Physical Exam:   Temp:  [98.7 °F (37.1 °C)] 98.7 °F (37.1 °C)  Heart Rate:  [66-83] 71  BP: ()/() 135/79  Physical Exam   Constitutional: He is oriented to person, place, and time. He appears well-developed and well-nourished. He is cooperative.   HENT:   Head: Normocephalic and atraumatic.   Right Ear: External ear normal.   Left Ear: External ear normal.   Nose: Nose normal.   Mouth/Throat: Oropharynx is clear and moist.   Eyes: Conjunctivae and EOM are normal. Pupils are equal, round, and reactive to light. No scleral icterus.   Neck: Normal range of motion. Neck supple. No JVD present. No tracheal deviation present.   Cardiovascular: Normal rate, regular rhythm and intact distal pulses.   Murmur (on right) heard.   Systolic murmur is present with a grade of 4/6.  Pulmonary/Chest: Effort normal and breath sounds normal. No respiratory distress.   Abdominal: Soft. Bowel sounds are normal. He exhibits no mass. There is no tenderness.   Musculoskeletal: Normal range of motion. He exhibits no edema or tenderness.   Left BKA.    Neurological: He is alert and oriented to person, place, and time. No cranial nerve deficit. Coordination normal.   Skin: Skin is warm and dry. There is erythema (mild area below right  knee).        There is a well healed vertical scar over the right knee.   Psychiatric: He has a normal mood and affect. His behavior is normal. Judgment and thought content normal.         Results Reviewed:  I have personally reviewed current lab, radiology, and data and agree with results.  Lab Results (last 24 hours)     Procedure Component Value Units Date/Time    Osmolality, Serum [[id number]]  (Normal) Collected:  08/07/19 1629    Specimen:  Blood Updated:  08/07/19 1716     Osmolality 289 mOsm/kg     aPTT [[id number]]  (Normal) Collected:  08/07/19 1629    Specimen:  Blood Updated:  08/07/19 1653     PTT 31.6 seconds     Protime-INR [[id number]]  (Normal) Collected:  08/07/19 1629    Specimen:  Blood Updated:  08/07/19 1653     Protime 14.3 Seconds      INR 1.08    Comprehensive Metabolic Panel [[id number]]  (Abnormal) Collected:  08/07/19 1629    Specimen:  Blood Updated:  08/07/19 1652     Glucose 112 mg/dL      BUN 13 mg/dL      Creatinine 0.79 mg/dL      Sodium 139 mmol/L      Potassium 4.0 mmol/L      Chloride 103 mmol/L      CO2 29.0 mmol/L      Calcium 9.4 mg/dL      Total Protein 7.3 g/dL      Albumin 4.40 g/dL      ALT (SGPT) 23 U/L      AST (SGOT) 21 U/L      Alkaline Phosphatase 160 U/L      Total Bilirubin 1.3 mg/dL      eGFR Non African Amer 99 mL/min/1.73      Globulin 2.9 gm/dL      A/G Ratio 1.5 g/dL      BUN/Creatinine Ratio 16.5     Anion Gap 7.0 mmol/L     Narrative:       GFR Normal >60  Chronic Kidney Disease <60  Kidney Failure <15    Potassium [[id number]]  (Normal) Collected:  08/07/19 1629    Specimen:  Blood Updated:  08/07/19 1652     Potassium 4.0 mmol/L     CBC Auto Differential [[id number]]  (Abnormal) Collected:  08/07/19 1629    Specimen:  Blood Updated:  08/07/19 1649     WBC 6.48 10*3/mm3      RBC 4.56 10*6/mm3      Hemoglobin 13.5 g/dL      Hematocrit 41.0 %      MCV 89.9 fL      MCH 29.6 pg      MCHC 32.9 g/dL      RDW 15.0 %      RDW-SD 49.2 fl      MPV 10.0 fL      Platelets 131  10*3/mm3      Neutrophil % 83.3 %      Lymphocyte % 10.0 %      Monocyte % 5.9 %      Eosinophil % 0.3 %      Basophil % 0.2 %      Neutrophils, Absolute 5.40 10*3/mm3      Lymphocytes, Absolute 0.65 10*3/mm3      Monocytes, Absolute 0.38 10*3/mm3      Eosinophils, Absolute 0.02 10*3/mm3      Basophils, Absolute 0.01 10*3/mm3         Imaging Results (last 24 hours)     Procedure Component Value Units Date/Time    CT Head Without Contrast [[id number]] Collected:  08/07/19 1441     Updated:  08/07/19 1455    Narrative:       CT HEAD WO CONTRAST- 8/7/2019 1:48 PM CDT     HISTORY: Intracranial hemorrhage       DOSE LENGTH PRODUCT: 542 mGy cm. Automated exposure control was also  utilized to decrease patient radiation dose.     Technique:   Axial CT of the brain without IV contrast. Sagittal and coronal  reformations are also provided for review. Soft tissue and bone kernels  are available for interpretation.     Comparison: None.     Findings:      Approximately 3.6 x 4.3 x 2.5 cm left occipital intraparenchymal  hematoma (series 7 image image 18 and series 3-image 17). There is a  collar of surrounding mild to moderate vasogenic edema. There is an area  of low-attenuation in the right frontoparietal region which appears to  reflect an area of subacute to chronic ischemia. Acute parafalcine, left  tentorial and left convexity subdural hemorrhage is also identified. The  left cerebral convexity components of the subdural hemorrhage measures  up to 0.6 cm in greatest thickness. Mass effect from this hemorrhage  effaces the left lateral ventricle and there is an approximately 4 mm  left-to-right midline shift. The third ventricle is also effaced. No  trapping of the right lateral ventricle. The basal cisterns are  symmetric. No subarachnoid hemorrhage identified. No calvarial fracture.     Small retention cyst in the left sphenoid sinus. The paranasal sinuses  are otherwise clear. The mastoids are clear.       Impression:        Impression:    1. Left occipital intraparenchymal hematoma measuring up to 4.3 cm.  2. Acute left cerebral convexity, parafalcine and left tentorial  subdural hemorrhage. This measures up to 0.6 cm in thickness along the  left frontal convexity.  3. Associated 4 mm left-to-right midline shift. No trapping of the right  lateral ventricle.  This report was finalized on 08/07/2019 14:52 by Dr Clayton Samayoa, .          Assessment / Plan   Assessment:     Active Hospital Problems    Diagnosis   • **Nontraumatic intracerebral hemorrhage (CMS/HCC)   • Headache   • Nausea   • Hypertension   • Morbid obesity (CMS/HCC)   • Cellulitis of left lower extremity        Plan:   Amoxicillin 500 mg p.o. every 8 hours  The area of erythema on the right lower extremity will be outlined with a marker.  Management of ICH per neurosurgery  We will continue to follow with you    I discussed the patient's findings and my recommendations with the patient and his nurse.    Bi Clemons DO  08/07/19  5:56 PM

## 2019-08-08 LAB
GLUCOSE BLDC GLUCOMTR-MCNC: 105 MG/DL (ref 70–130)
GLUCOSE BLDC GLUCOMTR-MCNC: 106 MG/DL (ref 70–130)
GLUCOSE BLDC GLUCOMTR-MCNC: 113 MG/DL (ref 70–130)
GLUCOSE BLDC GLUCOMTR-MCNC: 95 MG/DL (ref 70–130)
OSMOLALITY SERPL: 285 MOSM/KG (ref 289–308)
OSMOLALITY SERPL: 287 MOSM/KG (ref 289–308)
OSMOLALITY SERPL: 290 MOSM/KG (ref 289–308)
OSMOLALITY SERPL: 292 MOSM/KG (ref 289–308)
OSMOLALITY SERPL: 293 MOSM/KG (ref 289–308)
POTASSIUM BLD-SCNC: 3.6 MMOL/L (ref 3.5–5.3)
POTASSIUM BLD-SCNC: 3.8 MMOL/L (ref 3.5–5.3)
POTASSIUM BLD-SCNC: 3.8 MMOL/L (ref 3.5–5.3)
POTASSIUM BLD-SCNC: 3.9 MMOL/L (ref 3.5–5.3)
POTASSIUM BLD-SCNC: 3.9 MMOL/L (ref 3.5–5.3)
POTASSIUM BLD-SCNC: 4.2 MMOL/L (ref 3.5–5.3)
SODIUM BLD-SCNC: 139 MMOL/L (ref 135–145)
SODIUM BLD-SCNC: 139 MMOL/L (ref 135–145)
SODIUM BLD-SCNC: 140 MMOL/L (ref 135–145)
SODIUM BLD-SCNC: 140 MMOL/L (ref 135–145)
SODIUM BLD-SCNC: 141 MMOL/L (ref 135–145)

## 2019-08-08 PROCEDURE — 99231 SBSQ HOSP IP/OBS SF/LOW 25: CPT | Performed by: NURSE PRACTITIONER

## 2019-08-08 PROCEDURE — 83930 ASSAY OF BLOOD OSMOLALITY: CPT | Performed by: NURSE PRACTITIONER

## 2019-08-08 PROCEDURE — 82962 GLUCOSE BLOOD TEST: CPT

## 2019-08-08 PROCEDURE — 84132 ASSAY OF SERUM POTASSIUM: CPT | Performed by: NURSE PRACTITIONER

## 2019-08-08 PROCEDURE — 97166 OT EVAL MOD COMPLEX 45 MIN: CPT | Performed by: OCCUPATIONAL THERAPIST

## 2019-08-08 PROCEDURE — 83930 ASSAY OF BLOOD OSMOLALITY: CPT | Performed by: NEUROLOGICAL SURGERY

## 2019-08-08 PROCEDURE — 84295 ASSAY OF SERUM SODIUM: CPT | Performed by: NURSE PRACTITIONER

## 2019-08-08 PROCEDURE — 97162 PT EVAL MOD COMPLEX 30 MIN: CPT

## 2019-08-08 PROCEDURE — 97530 THERAPEUTIC ACTIVITIES: CPT | Performed by: OCCUPATIONAL THERAPIST

## 2019-08-08 PROCEDURE — 92523 SPEECH SOUND LANG COMPREHEN: CPT

## 2019-08-08 PROCEDURE — 97530 THERAPEUTIC ACTIVITIES: CPT

## 2019-08-08 PROCEDURE — 84295 ASSAY OF SERUM SODIUM: CPT | Performed by: NEUROLOGICAL SURGERY

## 2019-08-08 PROCEDURE — 97535 SELF CARE MNGMENT TRAINING: CPT | Performed by: OCCUPATIONAL THERAPIST

## 2019-08-08 RX ORDER — AMLODIPINE BESYLATE 5 MG/1
5 TABLET ORAL
Status: DISCONTINUED | OUTPATIENT
Start: 2019-08-08 | End: 2019-08-10 | Stop reason: HOSPADM

## 2019-08-08 RX ORDER — CITALOPRAM 10 MG/1
10 TABLET ORAL DAILY
COMMUNITY
End: 2019-11-01

## 2019-08-08 RX ADMIN — BUSPIRONE HYDROCHLORIDE 5 MG: 5 TABLET ORAL at 21:05

## 2019-08-08 RX ADMIN — LEVETIRACETAM 500 MG: 500 TABLET, FILM COATED ORAL at 21:04

## 2019-08-08 RX ADMIN — BUSPIRONE HYDROCHLORIDE 5 MG: 5 TABLET ORAL at 09:44

## 2019-08-08 RX ADMIN — OXYCODONE HYDROCHLORIDE AND ACETAMINOPHEN 1 TABLET: 5; 325 TABLET ORAL at 18:23

## 2019-08-08 RX ADMIN — OXYCODONE HYDROCHLORIDE AND ACETAMINOPHEN 1 TABLET: 5; 325 TABLET ORAL at 03:36

## 2019-08-08 RX ADMIN — SODIUM CHLORIDE 25 ML/HR: 3 INJECTION, SOLUTION INTRAVENOUS at 14:46

## 2019-08-08 RX ADMIN — LEVETIRACETAM 500 MG: 500 TABLET, FILM COATED ORAL at 09:44

## 2019-08-08 RX ADMIN — AMOXICILLIN 500 MG: 500 CAPSULE ORAL at 21:04

## 2019-08-08 RX ADMIN — SODIUM CHLORIDE 10 MG/HR: 9 INJECTION, SOLUTION INTRAVENOUS at 21:51

## 2019-08-08 RX ADMIN — SODIUM CHLORIDE 5 MG/HR: 9 INJECTION, SOLUTION INTRAVENOUS at 01:00

## 2019-08-08 RX ADMIN — AMLODIPINE BESYLATE 5 MG: 5 TABLET ORAL at 11:04

## 2019-08-08 RX ADMIN — AMOXICILLIN 500 MG: 500 CAPSULE ORAL at 06:07

## 2019-08-08 RX ADMIN — SODIUM CHLORIDE 15 MG/HR: 9 INJECTION, SOLUTION INTRAVENOUS at 19:53

## 2019-08-08 RX ADMIN — SODIUM CHLORIDE, PRESERVATIVE FREE 3 ML: 5 INJECTION INTRAVENOUS at 21:05

## 2019-08-08 RX ADMIN — AMOXICILLIN 500 MG: 500 CAPSULE ORAL at 13:57

## 2019-08-08 NOTE — PLAN OF CARE
Problem: Patient Care Overview  Goal: Plan of Care Review  Outcome: Ongoing (interventions implemented as appropriate)   08/08/19 0538   Coping/Psychosocial   Plan of Care Reviewed With patient   OTHER   Outcome Summary Pt eval complete. Pt A&Ox4. BKA LLE w/ prosthesis NWB RLE. Pt was able to navigate bed and sit EOB from supine w/ supervision/CGAx1. Sit<>stand transfer from sitting EOB to bedside chair min/modAx2. Pt showed decreased LLE strength in standing and weakened arm strength pushing through RWX. Pt will benefit from skilled PT to help improve strength and endurance for better mobility/ambulation post acute care stay. Recommend home w/ assist upon d/c from facility pending pt status/performance

## 2019-08-08 NOTE — PLAN OF CARE
Problem: Patient Care Overview  Goal: Plan of Care Review  Outcome: Ongoing (interventions implemented as appropriate)   08/08/19 3343   Coping/Psychosocial   Plan of Care Reviewed With patient   OTHER   Outcome Summary Pt. stood and transferred from chair to bed with wx and contact gaurd assist. Doffed his prosthesis independently.

## 2019-08-08 NOTE — PLAN OF CARE
Problem: Patient Care Overview  Goal: Plan of Care Review  Outcome: Ongoing (interventions implemented as appropriate)   08/08/19 1302   Coping/Psychosocial   Plan of Care Reviewed With patient   Plan of Care Review   Progress no change  (Initial evaluation)   OTHER   Outcome Summary Speech-language/cognitive evaluation complete. Pt noted to be alert, oriented x4, and able to follow all commands. No difficulty word finding or expressive/receptive language. Pt did require increased time at times to complete thoughout organization for moderate-high level reasoning and problem solving; however, this was WFL. SLP is signing off at this time as the pt does not require SLP services at this time. MD to re-consult if concerns arise.

## 2019-08-08 NOTE — THERAPY DISCHARGE NOTE
Acute Care - Speech Language Pathology Initial Eval/Discharge  [facility]     Patient Name: Michael Sorto  : 1955  MRN: [id number]  Today's Date: 2019  Onset of Illness/Injury or Date of Surgery: (P) 19     Referring Physician: [place]) Dr. Littlejohn       Admit Date: 2019   Speech-language/cognitive evaluation complete. Pt noted to be alert, oriented x4, and able to follow all commands. No difficulty word finding or expressive/receptive language. Pt did require increased time at times to complete thoughout organization for moderate-high level reasoning and problem solving; however, this was WFL. SLP is signing off at this time as the pt does not require SLP services at this time. MD to re-consult if concerns arise.  Antony Marquez, MS CCC-SLP 2019 1:09 PM    Visit Dx:    ICD-10-CM ICD-9-CM   1. Impaired cognition R41.89 294.9   2. Decreased functional mobility and endurance Z74.09 780.99   3. Decreased activities of daily living (ADL) R68.89 780.99     Patient Active Problem List   Diagnosis   • Headache   • Nausea   • Nontraumatic intracerebral hemorrhage (CMS/[facility])   • Hypertension   • Morbid obesity (CMS/[facility])   • Cellulitis of left lower extremity   • Intracerebral hemorrhage (CMS/HCC)     Past Medical History:   Diagnosis Date   • Cellulitis of right lower extremity    • Chronic pain syndrome    • CVA (cerebral vascular accident) (CMS/[facility])    • Depressive disorder    • GERD (gastroesophageal reflux disease)    • Hypertension    • Injury of lower leg    • Kidney stone    • Morbid obesity (CMS/[facility])    • Painful total knee replacement, initial encounter (CMS/[facility]) 3/6/2019   • Pyogenic arthritis of right knee joint (CMS/[facility]) 3/6/2019   • Right knee pain      Past Surgical History:   Procedure Laterality Date   • AMPUTATION Left    • AORTIC VALVE REPAIR/REPLACEMENT     • APPENDECTOMY     • CARDIAC CATHETERIZATION     • REPLACEMENT TOTAL KNEE Right    • TONSILLECTOMY     • TOTAL  KNEE  PROSTHESIS REMOVAL W/ SPACER INSERTION Right 6/18/2019    Procedure: REMOVAL OF TOTAL KNEE COMPONENTS RIGHT KNEE WITH INSERTION OF CEMENT ANTIBIOTIC SPACER;  Surgeon: Rufino Lu MD;  Location: [facility];  Service: Orthopedics          SLP EVALUATION (last 72 hours)      SLP SLC Evaluation     Row Name 08/08/19 0812                   Communication Assessment/Intervention    Document Type  evaluation  -CS        Subjective Information  no complaints  -CS        Patient Observations  alert;cooperative;agree to therapy  -CS        Patient/Family Observations  No family present  -CS        Patient Effort  good  -CS           General Information    Patient Profile Reviewed  yes  -CS        Pertinent History Of Current Problem  CT of head 8/07/2019- Left occipital intraparenchymal hematoma measuring up to 4.3 cm.  -CS        Precautions/Limitations, Vision  for purposes of eval  -CS        Precautions/Limitations, Hearing  WFL;for purposes of eval  -CS        Prior Level of Function-Communication  WFL  -CS        Plans/Goals Discussed with  patient  -CS        Barriers to Rehab  none identified  -CS        Patient's Goals for Discharge  patient did not state  -CS           Pain Assessment    Additional Documentation  Pain Scale: Numbers Pre/Post-Treatment (Group)  -CS           Pain Scale: Numbers Pre/Post-Treatment    Pain Scale: Numbers, Pretreatment  5/10  -CS        Pain Scale: Numbers, Post-Treatment  5/10  -CS           Comprehension Assessment/Intervention    Comprehension Assessment/Intervention  Auditory Comprehension  -CS           Auditory Comprehension Assessment/Intervention    Auditory Comprehension (Communication)  WNL  -CS        Able to Identify Objects/Pictures (Communication)  body part;familiar objects;WNL  -CS        Answers Questions (Communication)  yes/no;wh questions;personal;WFL  -CS        Able to Follow Commands (Communication)  1-step;2-step;WFL  -CS        Narrative Discourse   conversational level;WFL  -CS           Expression Assessment/Intervention    Expression Assessment/Intervention  verbal expression  -CS           Verbal Expression Assessment/Intervention    Verbal Expression  WFL  -CS        Automatic Speech (Communication)  months of year;WFL  -CS        Repetition  words;WFL  -CS        Phrase Completion  WFL  -CS        Confrontational Naming  WFL  -CS        Spontaneous/Functional Words  WNL  -CS        Sentence Formulation  WFL  -CS           Oral Motor Structure and Function    Oral Motor Structure and Function  WFL  -CS           Motor Speech Assessment/Intervention    Motor Speech Function  WFL  -CS           Cognitive Assessment Intervention- SLP    Cognitive Function (Cognition)  WFL  -CS        Orientation Status (Cognition)  person;place;time;situation;WNL  -CS        Memory (Cognitive)  WFL  -CS        Attention (Cognitive)  WFL  -CS        Thought Organization (Cognitive)  WFL  -CS        Reasoning (Cognitive)  WFL  -CS        Problem Solving (Cognitive)  WFL  -CS        Executive Function (Cognition)  WFL  -CS           SLP Clinical Impressions    Functional Impact  no impact on function  -CS           Recommendations    Therapy Frequency (SLP SLC)  evaluation only  -CS        Anticipated Dischage Disposition  unknown  -CS           SLP Discharge Summary    Discharge Destination  unknown  -CS          User Key  (r) = Recorded By, (t) = Taken By, (c) = Cosigned By    Initials Name Effective Dates    Antony Fajardo, MS CCC-SLP 04/03/18 -            EDUCATION  The patient has been educated in the following areas:   Cognitive Impairment.      SLP Recommendation and Plan              Anticipated Dischage Disposition: unknown          Plan of Care Reviewed With: patient  Plan of Care Review  Plan of Care Reviewed With: patient  Progress: no change(Initial evaluation)  Outcome Summary: Speech-language/cognitive evaluation complete. Pt noted to be alert, oriented x4, and  able to follow all commands. No difficulty word finding or expressive/receptive language. Pt did require increased time at times to complete thoughout organization for moderate-high level reasoning and problem solving; however, this was WFL. SLP is signing off at this time as the pt does not require SLP services at this time. MD to re-consult if concerns arise.              Time Calculation:   Time Calculation- SLP     Row Name 08/08/19 1308             Time Calculation- SLP    SLP Start Time  0812  -CS      SLP Stop Time  0900  -      SLP Time Calculation (min)  48 min  -      SLP Received On  08/08/19  -        User Key  (r) = Recorded By, (t) = Taken By, (c) = Cosigned By    Initials Name Provider Type     Antony Marquez MS CCC-SLP Speech and Language Pathologist          Therapy Charges for Today     Code Description Service Date Service Provider Modifiers Qty    [id number]  ST EVAL SPEECH AND PROD W LANG  3 8/8/2019 Antony Marquez MS CCC-SLP GN 1                   SLP Discharge Summary  Anticipated Dischage Disposition: unknown  Reason for Discharge: other (see comments)(Eval only)  Progress Toward Achieving Short/long Term Goals: other (see comments)(Eval only)  Discharge Destination: other (see comments)(Pt remains in acute care at this time.)    Antony Marquez MS CCC-SLP  8/8/2019

## 2019-08-08 NOTE — THERAPY EVALUATION
Acute Care - Physical Therapy Initial Evaluation  [facility]     Patient Name: Michael Sorto  : 1955  MRN: [id number]  Today's Date: 2019   Onset of Illness/Injury or Date of Surgery: 19  Date of Referral to PT: 19  Referring Physician: Dr. Littlejohn       Admit Date: 2019    Visit Dx:     ICD-10-CM ICD-9-CM   1. Impaired cognition R41.89 294.9   2. Decreased functional mobility and endurance Z74.09 780.99   3. Decreased activities of daily living (ADL) R68.89 780.99     Patient Active Problem List   Diagnosis   • Headache   • Nausea   • Nontraumatic intracerebral hemorrhage (CMS/[facility])   • Hypertension   • Morbid obesity (CMS/[facility])   • Cellulitis of left lower extremity   • Intracerebral hemorrhage (CMS/[facility])     Past Medical History:   Diagnosis Date   • Cellulitis of right lower extremity    • Chronic pain syndrome    • CVA (cerebral vascular accident) (CMS/[facility])    • Depressive disorder    • GERD (gastroesophageal reflux disease)    • Hypertension    • Injury of lower leg    • Kidney stone    • Morbid obesity (CMS/[facility])    • Painful total knee replacement, initial encounter (CMS/HCC) 3/6/2019   • Pyogenic arthritis of right knee joint (CMS/[facility]) 3/6/2019   • Right knee pain      Past Surgical History:   Procedure Laterality Date   • AMPUTATION Left    • AORTIC VALVE REPAIR/REPLACEMENT     • APPENDECTOMY     • CARDIAC CATHETERIZATION     • REPLACEMENT TOTAL KNEE Right    • TONSILLECTOMY     • TOTAL KNEE  PROSTHESIS REMOVAL W/ SPACER INSERTION Right 2019    Procedure: REMOVAL OF TOTAL KNEE COMPONENTS RIGHT KNEE WITH INSERTION OF CEMENT ANTIBIOTIC SPACER;  Surgeon: Rufino Lu MD;  Location: [facility];  Service: Orthopedics        PT ASSESSMENT (last 12 hours)      Physical Therapy Evaluation     Row Name 19 0756          PT Evaluation Time/Intention    Subjective Information  complains of;pain  -CHEO (r) SC (t) CHEO (c)     Document Type  evaluation  -CHEO (r) SC  (t) CHEO (c)     Mode of Treatment  concurrent therapy;physical therapy;other (see comments) See MAR   -CHEO (r) SC (t) CHEO (c)     Patient Effort  good  -CHEO (r) SC (t) CHEO (c)     Symptoms Noted During/After Treatment  none  -CHEO (r) SC (t) CHEO (c)     Row Name 08/08/19 0756          General Information    Patient Profile Reviewed?  yes  -CHEO (r) SC (t) CHEO (c)     Onset of Illness/Injury or Date of Surgery  08/07/19  -CHEO (r) SC (t) CHEO (c)     Referring Physician  Dr. Littlejohn   -CHEO (r) SC (t) CHEO (c)     Patient Observations  alert;cooperative;agree to therapy  -CHEO (r) SC (t) CHEO (c)     Patient/Family Observations  cousin in room   -CHEO (r) SC (t) CHEO (c)     General Observations of Patient  sitting up in bed, IV infused, alert   -CHEO (r) SC (t) CHEO (c)     Prior Level of Function  independent:;all household mobility;community mobility;gait;dressing;grooming;feeding  -CHEO (r) SC (t) CHEO (c)     Equipment Currently Used at Home  walker, rolling;rollator;wheelchair;bath bench;grab bar  -CHEO (r) SC (t) CHEO (c)     Pertinent History of Current Functional Problem  Pt t/f from OLF d/t nontraumatic ICH. Pt presents with worsening headache, nausea/vomiting, blurred vision and difficulty with depth preception. PMH significant for CVA in 2011, HTN, BKA of LLE in 2010, cellulitis of the R LE with recently on IV antibiotics, depression and morbid obesity. Imaging shows an acute parafalcine, L tenetorial and L convexity subdural hemorrhage, L occipital intraparenchymal hemorrhage with surrounding edema, L subdural hemorrhage with a 4 mm left to right shift and an area in the R frontal parietal region which appears to reflect chronic ischemia. Pt underwent R TKA, in June prothesis was removed and antibotic spacer was placed. Pt placed in knee immobilizer and NWB RLE. Dx: nontraumatic ICH, cellulitis of RLE, HTN  -CHEO (r) SC (t) CHEO (c)     Existing Precautions/Restrictions  fall;non-weight bearing;other (see comments)  (Significant)  NWB RLE, BKA  LLE w/ prosthesis   -CHEO (r) SC (t) CHEO (c)     Risks Reviewed  patient:;LOB;dizziness;nausea/vomiting;increased discomfort;change in vital signs;increased drainage;lines disloged  -CHEO (r) SC (t) CHEO (c)     Benefits Reviewed  patient:;improve function;increase independence;increase strength;increase balance;decrease pain;decrease risk of DVT;improve skin integrity;increase knowledge  -CHEO (r) SC (t) CHEO (c)     Barriers to Rehab  previous functional deficit  -CHEO (r) SC (t) CHEO (c)     Row Name 08/08/19 0756          Relationship/Environment    Primary Source of Support/Comfort  friend  -CHEO (r) SC (t) CHEO (c)     Lives With  alone  -CHEO (r) SC (t) CHEO (c)     Primary Roles/Responsibilities  disabled  -CHEO (r) SC (t) CHEO (c)     Row Name 08/08/19 0756          Resource/Environmental Concerns    Current Living Arrangements  home/apartment/condo  -CHEO (r) SC (t) CHEO (c)     Resource/Environmental Concerns  none  -CHEO (r) SC (t) CHEO (c)     Row Name 08/08/19 0756          Living Environment    Home Accessibility  wheelchair accessible;tub/shower is not walk in  -CHEO (r) SC (t) CHEO (c)     Row Name 08/08/19 0756          Cognitive Assessment/Interventions    Additional Documentation  Cognitive Assessment/Intervention (Group)  -CHEO (r) SC (t) CHEO (c)     Row Name 08/08/19 0756          Cognitive Assessment/Intervention- PT/OT    Affect/Mental Status (Cognitive)  WFL  -CHEO (r) SC (t) CHEO (c)     Orientation Status (Cognition)  oriented x 4  -CHEO (r) SC (t) CHEO (c)     Follows Commands (Cognition)  WFL  -CHEO (r) SC (t) CHEO (c)     Cognitive Function (Cognitive)  WFL  -CHEO (r) SC (t) CHEO (c)     Personal Safety Interventions  fall prevention program maintained;gait belt;muscle strengthening facilitated;nonskid shoes/slippers when out of bed  -CHEO (r) SC (t) CHEO (c)     Row Name 08/08/19 0756          Safety Issues, Functional Mobility    Safety Issues Affecting Function (Mobility)  insight into deficits/self awareness  -CHEO (r) SC (t) CHEO (c)      Impairments Affecting Function (Mobility)  balance;coordination;endurance/activity tolerance;pain;shortness of breath;strength  -CHEO (r) SC (t) CHEO (c)     Row Name 08/08/19 0756          Mobility Assessment/Treatment    Extremity Weight-bearing Status  right lower extremity  (Significant)   -CHEO (r)  CHEO (c)     Right Lower Extremity (Weight-bearing Status)  non weight-bearing (NWB)  (Significant)   -CHEO (r)  CHEO (c)     Row Name 08/08/19 0756          Bed Mobility Assessment/Treatment    Bed Mobility Assessment/Treatment  supine-sit;sit-supine  -CHEO (r) SC (t) CHEO (c)     Supine-Sit [place] (Bed Mobility)  supervision  -CHEO (r) SC (t) CHEO (c)     Sit-Supine [place] (Bed Mobility)  supervision  -CHEO (r) SC (t) CHEO (c)     Bed Mobility, Safety Issues  decreased use of legs for bridging/pushing  -CHEO (r) SC (t) CHEO (c)     Assistive Device (Bed Mobility)  bed rails;head of bed elevated  -CHEO (r) SC (t) CHEO (c)     Row Name 08/08/19 0756          Transfer Assessment/Treatment    Transfer Assessment/Treatment  sit-stand transfer;stand-sit transfer  -CHEO (r) SC (t) CHEO (c)     Maintains Weight-bearing Status (Transfers)  physical assist to maintain;verbal cues to maintain  -CHEO (r) SC (t) CHEO (c)     Sit-Stand [place] (Transfers)  minimum assist (75% patient effort);2 person assist  -CHEO (r) SC (t) CHEO (c)     Stand-Sit [place] (Transfers)  minimum assist (75% patient effort);2 person assist  -CHEO (r) SC (t) CHEO (c)     Row Name 08/08/19 0756          Sit-Stand Transfer    Assistive Device (Sit-Stand Transfers)  walker, front-wheeled  -CHEO (r) SC (t) CHEO (c)     Row Name 08/08/19 0756          Stand-Sit Transfer    Assistive Device (Stand-Sit Transfers)  walker, front-wheeled  -CHEO (r) SC (t) CHEO (c)     Row Name 08/08/19 0756          Gait/Stairs Assessment/Training    Gait/Stairs Assessment/Training  gait/ambulation assistive device  -CHEO (r) SC (t) CHEO (c)     [place] Level (Gait)  minimum assist (75% patient effort);2  person assist  -CHEO (r) SC (t) CHEO (c)     Assistive Device (Gait)  walker, front-wheeled  -CHEO (r) SC (t) CHEO (c)     Distance in Feet (Gait)  5, bed to chair   -CHEO (r) SC (t) CHEO (c)     Pattern (Gait)  step-to  -CHEO (r) SC (t) CHEO (c)     Comment (Gait/Stairs)  RLE NWB, BKA LLE w/ prothesis  (Significant)   -CHEO (r) SC (t) CHEO (c)     Row Name 08/08/19 0756          General ROM    GENERAL ROM COMMENTS  RLE in knee immobilizer, LLE hip and knee AROM WFL   -CHEO (r) SC (t) CHEO (c)     Row Name 08/08/19 0756          MMT (Manual Muscle Testing)    General MMT Comments  functionally 4/5 w/ mobility and gait training   -CHEO (r) SC (t) CHEO (c)     Row Name 08/08/19 0756          Motor Assessment/Intervention    Additional Documentation  Balance (Group)  -CHEO (r) SC (t) CHEO (c)     Row Name 08/08/19 0756          Balance    Balance  static sitting balance;static standing balance  -CHEO (r) SC (t) CHEO (c)     Row Name 08/08/19 0756          Static Sitting Balance    Level of [place] (Unsupported Sitting, Static Balance)  supervision  -CHEO (r) SC (t) CHEO (c)     Sitting Position (Unsupported Sitting, Static Balance)  sitting on edge of bed  -CHEO (r) SC (t) CHEO (c)     Time Able to Maintain Position (Unsupported Sitting, Static Balance)  more than 5 minutes  -CHEO (r) SC (t) CHEO (c)     Row Name 08/08/19 0756          Static Standing Balance    Level of [place] (Supported Standing, Static Balance)  minimal assist, 75% patient effort;2 person assist  -CHEO (r) SC (t) CHEO (c)     Time Able to Maintain Position (Supported Standing, Static Balance)  45 to 60 seconds  -CHEO (r) SC (t) CHEO (c)     Assistive Device Utilized (Supported Standing, Static Balance)  prosthesis  -CHEO (r) SC (t) CHEO (c)     Row Name 08/08/19 0756          Sensory Assessment/Intervention    Sensory General Assessment  no sensation deficits identified  -CHEO (r) SC (t) CHEO (c)     Row Name 08/08/19 0756          Pain Assessment    Additional Documentation  Pain Scale: Numbers  Pre/Post-Treatment (Group)  -CHEO (r) SC (t) CHEO (c)     Row Name 08/08/19 0756          Pain Scale: Numbers Pre/Post-Treatment    Pain Scale: Numbers, Pretreatment  5/10  -CHEO (r) SC (t) CHEO (c)     Pain Scale: Numbers, Post-Treatment  5/10  -CHEO (r) SC (t) CHEO (c)     Pain Location - Orientation  posterior  -CHEO (r) SC (t) CHEO (c)     Pain Location  head  -CHEO (r) SC (t) CHEO (c)     Pain Intervention(s)  Medication (See MAR);Repositioned;Ambulation/increased activity  -CHEO (r) SC (t) CHEO (c)     Row Name 08/08/19 0756          Coping    Observed Emotional State  attention-seeking behavior;overstimulated/overactive  -CHEO (r) SC (t) CHEO (c)     Verbalized Emotional State  acceptance  -CHEO (r) SC (t) CHEO (c)     Row Name 08/08/19 0756          Plan of Care Review    Plan of Care Reviewed With  patient  -CHEO (r) SC (t) CHEO (c)     Row Name 08/08/19 0756          Physical Therapy Clinical Impression    Date of Referral to PT  08/07/19  -CHEO (r) SC (t) CHEO (c)     PT Diagnosis (PT Clinical Impression)  decreased mobility   -CHEO (r) SC (t) CHEO (c)     Prognosis (PT Clinical Impression)  fair   -CHEO (r) SC (t) CHEO (c)     Functional Level at Time of Evaluation (PT Clinical Impression)  assist w/ OOB mobility  -CHEO (r) SC (t) CHEO (c)     Patient/Family Goals Statement (PT Clinical Impression)  return to previous living situation   -CHEO (r) SC (t) CHEO (c)     Criteria for Skilled Interventions Met (PT Clinical Impression)  yes;treatment indicated  -CHEO (r) SC (t) CHEO (c)     Impairments Found (describe specific impairments)  aerobic capacity/endurance;gait, locomotion, and balance  -CHEO (r) SC (t) CHEO (c)     Rehab Potential (PT Clinical Summary)  good, to achieve stated therapy goals  -CHEO (r) SC (t) CHEO (c)     Predicted Duration of Therapy (PT)  until d/c   -CHEO (r) SC (t) CHEO (c)     Care Plan Review (PT)  evaluation/treatment results reviewed;care plan/treatment goals reviewed;risks/benefits reviewed;current/potential barriers reviewed;patient/other agree  to care plan  -CHEO (r) SC (t) CHEO (c)     Row Name 08/08/19 0756          Vital Signs    Pre Systolic BP Rehab  113  -CHEO (r) SC (t) CHEO (c)     Pre Treatment Diastolic BP  73  -CHEO (r) SC (t) CHEO (c)     Post Systolic BP Rehab  123  -CHEO (r) SC (t) CHEO (c)     Post Treatment Diastolic BP  69  -CHEO (r) SC (t) CHEO (c)     Pretreatment Heart Rate (beats/min)  63  -CHEO (r) SC (t) CHEO (c)     Posttreatment Heart Rate (beats/min)  59  -CHEO (r) SC (t) CHEO (c)     Pretreatment Resp Rate (breaths/min)  16  -CHEO (r) SC (t) CHEO (c)     Posttreatment Resp Rate (breaths/min)  19  -CHEO (r) SC (t) CHEO (c)     Pre SpO2 (%)  97  -CHEO (r) SC (t) CHEO (c)     O2 Delivery Pre Treatment  room air  -CHEO (r) SC (t) CHEO (c)     Post SpO2 (%)  97  -CHEO (r) SC (t) CHEO (c)     O2 Delivery Post Treatment  room air  -CHEO (r) SC (t) CHEO (c)     Pre Patient Position  Supine  -CHEO (r) SC (t) CHEO (c)     Intra Patient Position  Standing  -CHEO (r) SC (t) CHEO (c)     Post Patient Position  Sitting  -CHEO (r) SC (t) CHEO (c)     Row Name 08/08/19 0756          Physical Therapy Goals    Bed Mobility Goal Selection (PT)  bed mobility, PT goal 1  -CHEO (r) SC (t) CHEO (c)     Transfer Goal Selection (PT)  transfer, PT goal 1  -CHEO (r) SC (t) CHEO (c)     Gait Training Goal Selection (PT)  gait training, PT goal 1  -CHEO (r) SC (t) CHEO (c)     Row Name 08/08/19 0756          Bed Mobility Goal 1 (PT)    Activity/Assistive Device (Bed Mobility Goal 1, PT)  bed mobility activities, all  -CHEO (r) SC (t) CHEO (c)     [place] Level/Cues Needed (Bed Mobility Goal 1, PT)  conditional independence  -CHEO (r) SC (t) CHEO (c)     Time Frame (Bed Mobility Goal 1, PT)  long term goal (LTG);10 days  -CHEO (r) SC (t) CHEO (c)     Progress/Outcomes (Bed Mobility Goal 1, PT)  goal ongoing  -CHEO (r) SC (t) CHEO (c)     Row Name 08/08/19 0756          Transfer Goal 1 (PT)    Activity/Assistive Device (Transfer Goal 1, PT)  transfers, all  -CHEO (r) SC (t) CHEO (c)     [place] Level/Cues Needed (Transfer Goal 1, PT)  contact  guard assist  -CHEO (r) SC (t) CHEO (c)     Time Frame (Transfer Goal 1, PT)  long term goal (LTG);10 days  -CHEO (r) SC (t) CHEO (c)     Progress/Outcome (Transfer Goal 1, PT)  goal ongoing  -CHEO (r) SC (t) CHEO (c)     Row Name 08/08/19 0756          Gait Training Goal 1 (PT)    Activity/Assistive Device (Gait Training Goal 1, PT)  gait (walking locomotion);assistive device use;forward stepping;decrease fall risk;improve balance and speed;increase endurance/gait distance;increase energy conservation;maintain weight bearing status  -CHEO (r) SC (t) CHEO (c)     [place] Level (Gait Training Goal 1, PT)  contact guard assist  -CHEO (r) SC (t) CHEO (c)     Distance (Gait Goal 1, PT)  50  -CHEO (r) SC (t) CHEO (c)     Time Frame (Gait Training Goal 1, PT)  long term goal (LTG);10 days  -CHEO (r) SC (t) CHEO (c)     Progress/Outcome (Gait Training Goal 1, PT)  goal ongoing  -CHEO (r) SC (t) CHEO (c)     Row Name 08/08/19 0756          Positioning and Restraints    Pre-Treatment Position  in bed  -CHEO (r) SC (t) CHEO (c)     Post Treatment Position  chair  -CHEO (r) SC (t) CHEO (c)     In Chair  reclined;call light within reach;encouraged to call for assist;with SLP  -CHEO (r) SC (t) CHEO (c)       User Key  (r) = Recorded By, (t) = Taken By, (c) = Cosigned By    Initials Name Provider Type    Alton Diaz, PT DPT Physical Therapist    Kayden Moreno, JACQUELYN Student PT Student        Physical Therapy Education     Title: PT OT SLP Therapies (In Progress)     Topic: Physical Therapy (In Progress)     Point: Mobility training (Done)     Learning Progress Summary           Patient Acceptance, E, VU by SC at 8/8/2019  9:27 AM    Comment:  pt understands mobility precautions and safety concerns                   Point: Body mechanics (Done)     Learning Progress Summary           Patient Acceptance, E, VU by SC at 8/8/2019  9:27 AM    Comment:  pt understands mobility precautions and safety concerns                   Point: Precautions (Done)      Learning Progress Summary           Patient Acceptance, KRANTHI, VU by SC at 8/8/2019  9:27 AM    Comment:  pt understands mobility precautions and safety concerns                               User Key     Initials Effective Dates Name Provider Type Discipline    SC 05/15/19 -  Kayden Dunn, PT Student PT Student PT              PT Recommendation and Plan  Anticipated Discharge Disposition (PT): home with assist  Therapy Frequency (PT Clinical Impression): daily  Outcome Summary/Treatment Plan (PT)  Anticipated Discharge Disposition (PT): home with assist  Plan of Care Reviewed With: patient  Outcome Summary: Pt eval complete. Pt A&Ox4. BKA LLE w/ prosthesis NWB RLE. Pt was able to navigate bed and sit EOB from supine w/ supervision/CGAx1. Sit<>stand transfer from sitting EOB to bedside chair min/modAx2. Pt showed decreased LLE strength in standing and weakened arm strength pushing through RWX. Pt will benefit from skilled PT to help improve strength and endurance for better mobility/ambulation post acute care stay. Recommend home w/ assist upon d/c from facility pending pt status/performance   Outcome Measures     Row Name 08/08/19 1200 08/08/19 0756          How much help from another person do you currently need...    Turning from your back to your side while in flat bed without using bedrails?  --  4  -CHEO (r) SC (t) CHEO (c)     Moving from lying on back to sitting on the side of a flat bed without bedrails?  --  3  -CHEO (r) SC (t) CHEO (c)     Moving to and from a bed to a chair (including a wheelchair)?  --  2  -CHEO (r) SC (t) CHEO (c)     Standing up from a chair using your arms (e.g., wheelchair, bedside chair)?  --  3  -CHEO (r) SC (t) CHEO (c)     Climbing 3-5 steps with a railing?  --  1  -CHEO (r) SC (t) CHEO (c)     To walk in hospital room?  --  2  -CHEO (r) SC (t) CHEO (c)     AM-PAC 6 Clicks Score (PT)  --  15  -WG (r) SC (t)        How much help from another is currently needed...    Putting on and taking off regular  lower body clothing?  3  -JJ  --     Bathing (including washing, rinsing, and drying)  3  -JJ  --     Toileting (which includes using toilet bed pan or urinal)  3  -JJ  --     Putting on and taking off regular upper body clothing  4  -JJ  --     Taking care of personal grooming (such as brushing teeth)  4  -JJ  --     Eating meals  4  -JJ  --     AM-PAC 6 Clicks Score (OT)  21  -JJ  --        Functional Assessment    Outcome Measure Options  AM-PAC 6 Clicks Daily Activity (OT)  -JJ  AM-PAC 6 Clicks Basic Mobility (PT)  -CHEO (r) SC (t) CHEO (c)       User Key  (r) = Recorded By, (t) = Taken By, (c) = Cosigned By    Initials Name Provider Type    Alton Diaz, PT DPT Physical Therapist    Devora Bermudez, RN Registered Nurse    Susan Kumar, OTR/L Occupational Therapist    Kayden Moreno, PT Student PT Student         Time Calculation:   PT Charges     Row Name 08/08/19 1455 08/08/19 0928          Time Calculation    Start Time  1422  -FELIZ  0756  -CHEO (r) SC (t) CHEO (c)     Stop Time  1445  -FELIZ  0902  -CHEO (r) SC (t) CHEO (c)     Time Calculation (min)  23 min  -FELIZ  66 min  -CHEO (r) SC (t)     PT Received On  08/08/19  -FELIZ  08/08/19  -CHEO (r) SC (t) CHEO (c)     PT Goal Re-Cert Due Date  --  08/18/19  -CHEO (r) SC (t) CHEO (c)        Timed Charges    78426 - PT Therapeutic Activity Minutes  23  -FELIZ  --       User Key  (r) = Recorded By, (t) = Taken By, (c) = Cosigned By    Initials Name Provider Type    Alton Diaz, PT DPT Physical Therapist    Antonia Recinos, PTA Physical Therapy Assistant    SC Kayden Dunn, PT Student PT Student        Therapy Charges for Today     Code Description Service Date Service Provider Modifiers Qty    [id number] HC PT EVAL MOD COMPLEXITY 4 8/8/2019 Kayden Dunn, PT Student GP 1          PT G-Codes  Outcome Measure Options: AM-PAC 6 Clicks Daily Activity (OT)  AM-PAC 6 Clicks Score (PT): 15  AM-PAC 6 Clicks Score (OT): 21      Kayden Dunn,  PT Student  8/8/2019

## 2019-08-08 NOTE — PLAN OF CARE
Problem: Patient Care Overview  Goal: Plan of Care Review  Outcome: Ongoing (interventions implemented as appropriate)   08/08/19 8049   Coping/Psychosocial   Plan of Care Reviewed With patient   Plan of Care Review   Progress improving   OTHER   Outcome Summary Intermittant use of Cardene gtt for SBP>140, No significant increase in redness to right lower leg, medicated for left sided HA twice this shift with good relief. Tolerating liquids without pb. Na level 141 and K+ 3.8       Problem: Skin Injury Risk (Adult)  Goal: Identify Related Risk Factors and Signs and Symptoms  Outcome: Ongoing (interventions implemented as appropriate)    Goal: Skin Health and Integrity  Outcome: Ongoing (interventions implemented as appropriate)      Problem: Fall Risk (Adult)  Goal: Identify Related Risk Factors and Signs and Symptoms  Outcome: Ongoing (interventions implemented as appropriate)    Goal: Absence of Fall  Outcome: Ongoing (interventions implemented as appropriate)      Problem: Stroke (Hemorrhagic) (Adult)  Goal: Signs and Symptoms of Listed Potential Problems Will be Absent, Minimized or Managed (Stroke)  Outcome: Ongoing (interventions implemented as appropriate)

## 2019-08-08 NOTE — PROGRESS NOTES
Continued Stay Note   Cathy     Patient Name: Michael Sorto  MRN: [id number]  Today's Date: 8/8/2019    Admit Date: 8/7/2019    Discharge Plan     Row Name 08/08/19 1131       Plan    Plan  Attempted to see patient a couple of times this am and he has been up in chair, talking on cell phone.  Will attempt to screen patient at another time.        Discharge Codes    No documentation.             MARISELA Stringer

## 2019-08-08 NOTE — PAYOR COMM NOTE
"FROM: JONATHAN HORTON  PHONE: [phone]  FAX: [phone]    PENDIN      Shirley Sorto (64 y.o. Male)     Date of Birth Social Security Number Address Home Phone MRN    1955  [id number] [facility] [address] [phone] [id number]    Hinduism Marital Status          Anabaptism Single       Admission Date Admission Type Admitting Provider Attending Provider Department, Room/Bed    19 Elective Silvestre Littlejohn MD [place], William Lee, MD [facility] CARD[facility]AC [facility], C005/    Discharge Date Discharge Disposition Discharge Destination                       Attending Provider:  Silvestre Littlejohn MD    Allergies:  No Known Allergies    Isolation:  None   Infection:  None   Code Status:  CPR    Ht:  165.1 cm (65\")   Wt:  138 kg (303 lb 6.4 oz)    Admission Cmt:  None   Principal Problem:  Nontraumatic intracerebral hemorrhage (CMS/HCC) [I61.9]                 Active Insurance as of 2019     Primary Coverage     Payor Plan Insurance Group Employer/Plan Group    MEDICARE MEDICARE A & B      Payor Plan Address Payor Plan Phone Number Payor Plan Fax Number Effective Dates    [address] [phone]  2013 - None Entered    [facility] [id number]       Subscriber Name Subscriber Birth Date Member ID       SHIRLEY SORTO 1955 [id number]           Secondary Coverage     Payor Plan Insurance Group Employer/Plan Group    HUMANA MEDICARE REPLACEMENT HUMANA MEDICARE REPL [id number]     Payor Plan Address Payor Plan Phone Number Payor Plan Fax Number Effective Dates    [address] [address] [phone]  2019 - None Entered    [facility] [address]       Subscriber Name Subscriber Birth Date Member ID       SHILREY SORTO 1955 [id number]                 Emergency Contacts      (Rel.) Home Phone Work Phone Mobile Phone    ZACH ELIZABETH (Friend) -- -- [phone]    Stephanie Elizabeth (Other) -- -- [phone]               History & Physical      Sabino De León, " APRN at 8/7/2019  3:03 PM          NEUROSURGERY INITIAL HOSPITAL ENCOUNTER    Assessment/Plan:   Michael Sorto is a 64 y.o. male.  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  He presents with a new problem of worsening headaches and nausea.  Physical exam findings of alert and oriented, no pronator drift or dysmetria, follow simple commands, GCS 15; edema, erythema, and warmth to right lower extremity.  Their imaging shows an acute parafalcine, left tentorial and left convexity subdural hemorrhage, left occipital intraparenchymal hemorrhage with surrounding edema, left subdural hemorrhage with a 4 mm left to right midline shift, and an area in the right frontal parietal region which appears to reflect chronic ischemia.    Differential Diagnosis:   Intracerebral Hemorrhage: HTN, anticoagulation, trauma, neoplasm, vascular malformation, amyloid angiopathy, idiopathic.     ICH Score    Volume: 1 Point (ICH volume =/>30 cm3)  Age: 0 Points (Age < 80 years)  GCS: 0 Points (GCS 13 to 15)  Location: 0 Points (Infratentorial Origin - No )  Intraventricular Extension: 0 Points (Intraventricular Extension Absent)  Total = 1    ICH Score Mortality Rate   0 0%   1 13%   2 26%   3 72%   4 94%   5 100%   6* 100%   90 DAY MORTALITY RATE    References:  Stroke. 2001 Apr;32(4):891-7.  Neurocrit Care. 2004;1(1):53-60.    Recommendations:  Admit to ICU for every hour neuro checks  CTA head  SBP < 140.  Cardene gtt  Repeat head CT in 6 hours  Keppra 500mg BID  Hypertonic solution with a sodium goal of 145-150 mg/dL; osmolarity < 320  Sodium, glucose, and osmolarity's every 6 hours  NPO with Speech evaluation    I discussed the patients findings and my recommendations with patient, nursing staff and consulting provider    Thank you very much for this interesting  consult.     Level of Risk: Moderate due to: acute illness with sytemic symptoms  MDM: Moderate Complexity  Mod = 73049, High=99223  ___________________________________________________________________    Reason for consult intracranial hemorrhage    Chief Complaint: Headache and nausea    HPI: Michael Sorto is a 64 y.o. 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  He presents with a new problem of worsening headaches and nausea.  Gradual and progressive onset over the past week with intermittent headaches, blurred vision, loss of depth perception, nausea, and vomiting.  For continued headaches, Mr. Sorto presented to [facility] ED this a.m. where he was found to have an acute intracranial hemorrhage.  He denies dizziness, diplopia, numbness or tingling, unilateral weakness, or seizure-like activity.  He has since been transported to [facility] for further evaluation and care of ICH per neurosurgery.    Review of Systems   Constitutional: Negative.    Eyes: Negative.    Respiratory: Negative.    Cardiovascular: Negative.    Gastrointestinal: Positive for nausea and vomiting.   Endocrine: Negative.    Genitourinary: Negative.    Musculoskeletal: Negative.    Skin: Positive for color change and wound.   Allergic/Immunologic: Negative.    Neurological: Positive for headaches. Negative for dizziness, tremors, seizures, syncope, facial asymmetry, speech difficulty and weakness.   Hematological: Negative.    Psychiatric/Behavioral: Negative.    All other systems reviewed and are negative.     Past Medical History:  has a past medical history of Cellulitis of right lower extremity, Chronic pain syndrome, CVA (cerebral vascular accident) (CMS/[facility]) (2011), Depressive disorder, GERD  (gastroesophageal reflux disease), Hypertension, Injury of lower leg, Kidney stone, Morbid obesity (CMS/HCC), and Right knee pain.    Past Surgical History:  has a past surgical history that includes Replacement total knee (Right, 2011); Amputation (Left, 2010); Aortic valve replacement (2011); Tonsillectomy; Appendectomy; Cardiac catheterization; and Total knee  prosthesis removal w/ spacer insertion (Right, 6/18/2019).    Family History: family history is not on file.    Social History:  reports that he has quit smoking. He has quit using smokeless tobacco. He reports that he does not drink alcohol or use drugs.    Allergies: Patient has no known allergies.    Home Medications:   Current Facility-Administered Medications:   •  hydrALAZINE (APRESOLINE) injection 5 mg, 5 mg, Intravenous, Q15 Min PRN, Silvestre Littlejohn MD  •  labetalol (NORMODYNE,TRANDATE) injection 10 mg, 10 mg, Intravenous, Once, Silvestre Littlejohn MD  •  niCARdipine (CARDENE) 25 mg/250 mL (0.1 mg/mL) 0.9% NS infusion, 5-15 mg/hr, Intravenous, Titrated, Silvestre Littlejohn MD, Last Rate: 100 mL/hr at 08/07/19 1502, 10 mg/hr at 08/07/19 1502    Medications: Scheduled Meds:  labetalol 10 mg Intravenous Once     Continuous Infusions:  niCARdipine 5-15 mg/hr Last Rate: 10 mg/hr (08/07/19 1502)     PRN Meds:.•  hydrALAZINE    Vital Signs  Temp:  [98.7 °F (37.1 °C)] 98.7 °F (37.1 °C)  Heart Rate:  [66-83] 72  BP: (147-194)/() 180/110    Physical Exam  Physical Exam   Constitutional: He is oriented to person, place, and time. He appears well-developed and well-nourished.  Non-toxic appearance. He does not have a sickly appearance. He does not appear ill. No distress.   BMI 49.92   HENT:   Head: Normocephalic and atraumatic.   Right Ear: Hearing normal.   Left Ear: Hearing normal.   Mouth/Throat: Mucous membranes are normal.   Eyes: Conjunctivae and EOM are normal. Pupils are equal, round, and reactive to light.   Neck: Trachea normal  and full passive range of motion without pain. Neck supple.   Cardiovascular: Normal rate and regular rhythm.   Pulmonary/Chest: Effort normal. No accessory muscle usage. No apnea, no tachypnea and no bradypnea. No respiratory distress.   Abdominal: Soft. Normal appearance.   Musculoskeletal:   BKA noted to left lower extremity.   Neurological: He is alert and oriented to person, place, and time.   Skin: Skin is warm, dry and intact.   Edema, erythema, and warmth to right lower extremity.   Psychiatric: He has a normal mood and affect. His speech is normal and behavior is normal.   Nursing note and vitals reviewed.      Neurologic Exam     Mental Status   Oriented to person, place, and time.   Attention: normal. Concentration: normal.   Speech: speech is normal   Level of consciousness: alert  Able to name object.     Cranial Nerves     CN II   Visual fields full to confrontation.     CN III, IV, VI   Pupils are equal, round, and reactive to light.  Extraocular motions are normal.     CN V   Facial sensation intact.     CN VII   Facial expression full, symmetric.     CN VIII   CN VIII normal.     CN IX, X   CN IX normal.     CN XI   CN XI normal.     Motor Exam   Muscle bulk: normal  Overall muscle tone: normal  Right arm tone: normal  Left arm tone: normal  Right arm pronator drift: absent  Left arm pronator drift: absent  Right leg tone: normal  Left leg tone: normal    Strength   Right deltoid: 5/5  Left deltoid: 5/5  Right biceps: 5/5  Left biceps: 5/5  Right triceps: 5/5  Left triceps: 5/5  Right wrist extension: 5/5  Left wrist extension: 5/5  Right iliopsoas: 4/5  Left iliopsoas: 5/5  Right quadriceps: 4/5  Right anterior tibial: 4/5  Right posterior tibial: 4/5No dysmetria     Sensory Exam   Right arm light touch: normal  Left arm light touch: normal  Right leg light touch: normal  Left leg light touch: normal    Gait, Coordination, and Reflexes     Tremor   Resting tremor: absent  Intention tremor:  absent  Action tremor: absent    Reflexes   Right plantar: normal  Left plantar: normal  Right Mcbride: absent  Left Mcbride: absent  Right ankle clonus: absent  Left ankle clonus: absent  Right pendular knee jerk: absent  Left pendular knee jerk: absent      Results Review:   Independent review and interpretation of imaging  Imaging Results (last 24 hours)     Procedure Component Value Units Date/Time    CT Head Without Contrast [[id number]] Collected:  08/07/19 1441     Updated:  08/07/19 1455    Narrative:       CT HEAD WO CONTRAST- 8/7/2019 1:48 PM CDT     HISTORY: Intracranial hemorrhage       DOSE LENGTH PRODUCT: 542 mGy cm. Automated exposure control was also  utilized to decrease patient radiation dose.     Technique:   Axial CT of the brain without IV contrast. Sagittal and coronal  reformations are also provided for review. Soft tissue and bone kernels  are available for interpretation.     Comparison: None.     Findings:      Approximately 3.6 x 4.3 x 2.5 cm left occipital intraparenchymal  hematoma (series 7 image image 18 and series 3-image 17). There is a  collar of surrounding mild to moderate vasogenic edema. There is an area  of low-attenuation in the right frontoparietal region which appears to  reflect an area of subacute to chronic ischemia. Acute parafalcine, left  tentorial and left convexity subdural hemorrhage is also identified. The  left cerebral convexity components of the subdural hemorrhage measures  up to 0.6 cm in greatest thickness. Mass effect from this hemorrhage  effaces the left lateral ventricle and there is an approximately 4 mm  left-to-right midline shift. The third ventricle is also effaced. No  trapping of the right lateral ventricle. The basal cisterns are  symmetric. No subarachnoid hemorrhage identified. No calvarial fracture.     Small retention cyst in the left sphenoid sinus. The paranasal sinuses  are otherwise clear. The mastoids are clear.       Impression:        Impression:    1. Left occipital intraparenchymal hematoma measuring up to 4.3 cm.  2. Acute left cerebral convexity, parafalcine and left tentorial  subdural hemorrhage. This measures up to 0.6 cm in thickness along the  left frontal convexity.  3. Associated 4 mm left-to-right midline shift. No trapping of the right  lateral ventricle.  This report was finalized on 08/07/2019 14:52 by Dr Clayton Samayoa, .        MRI brain:  MRI spine:   CT Head:        CT c-spine:  CT t-spine:  CT l-spine:  X-ray:    I reviewed the patient's new clinical results.  Lab Results (last 24 hours)     ** No results found for the last 24 hours. **        BRAN Benton          Electronically signed by Sabino De León APRN at 8/7/2019  3:34 PM       Emergency Department Notes     No notes of this type exist for this encounter.        ICU Vital Signs     Row Name 08/08/19 0720 08/08/19 0705 08/08/19 0650 08/08/19 0635 08/08/19 0620       Vitals    Pulse  64  55  67  57  57    Heart Rate Source  --  Monitor  --  --  --    Resp  --  16  --  --  --    Resp Rate Source  --  Monitor  --  --  --    BP  115/87  131/72  131/80  158/81  139/83    Noninvasive MAP (mmHg)  98  90  96  105  100    BP Location  --  Left arm  --  --  --    BP Method  --  Automatic  --  --  --    Patient Position  --  Lying  --  --  --       Oxygen Therapy    SpO2  100 %  96 %  96 %  96 %  97 %    Pulse Oximetry Type  --  Continuous  --  --  --    Device (Oxygen Therapy)  --  room air  --  --  --    Row Name 08/08/19 0617 08/08/19 0602 08/08/19 0547 08/08/19 0535 08/08/19 0520       Vitals    Pulse  57  61  60  68  63    Heart Rate Source  --  Monitor  --  --  --    Resp  --  17  --  --  --    Resp Rate Source  --  Monitor  --  --  --    BP  139/83  140/83  137/76  139/72  121/55    Noninvasive MAP (mmHg)  100  101  94  91  75    BP Location  --  Left arm  --  --  --    BP Method  --  Automatic  --  --  --    Patient Position  --  Lying  --  --  --       Oxygen Therapy     SpO2  96 %  100 %  98 %  98 %  98 %    Pulse Oximetry Type  --  Continuous  --  --  --    Device (Oxygen Therapy)  --  room air  --  --  --    Row Name 08/08/19 0505 08/08/19 0450 08/08/19 0432 08/08/19 0408 08/08/19 0405       Vitals    Pulse  59  60  61  --  62    Heart Rate Source  Monitor  --  --  --  Monitor    Resp  14  --  --  --  14    Resp Rate Source  Monitor  --  --  --  Monitor    BP  119/70  126/75  128/71  --  117/78    Noninvasive MAP (mmHg)  85  91  88  --  91    BP Location  Left arm  --  --  --  Left arm    BP Method  Automatic  --  --  --  Automatic    Patient Position  Lying  --  --  --  Lying       Oxygen Therapy    SpO2  94 %  93 %  95 %  --  95 %    Pulse Oximetry Type  Continuous  --  --  --  Continuous    Device (Oxygen Therapy)  room air  --  --  room air  room air    Row Name 08/08/19 0347 08/08/19 0337 08/08/19 0334 08/08/19 0320 08/08/19 0305       Height and Weight    Weight  --  138 kg (303 lb 6.4 oz)  (Abnormal)   --  --  --    Weight Method  --  Bed scale  --  --  --       Vitals    Temp  --  --  97.9 °F (36.6 °C)  --  --    Temp src  --  --  Oral  --  --    Pulse  63  --  62  60  64    Heart Rate Source  --  --  --  --  Monitor    Resp  --  --  --  --  16    Resp Rate Source  --  --  --  --  Monitor    BP  135/73  --  118/99  141/79  133/59    Noninvasive MAP (mmHg)  93  --  107  97  82    BP Location  --  --  --  --  Left arm    BP Method  --  --  --  --  Automatic    Patient Position  --  --  --  --  Lying       Oxygen Therapy    SpO2  97 %  --  97 %  98 %  98 %    Pulse Oximetry Type  --  --  --  --  Continuous    Device (Oxygen Therapy)  --  --  --  --  room air    Row Name 08/08/19 0250 08/08/19 0235 08/08/19 0220 08/08/19 0205 08/08/19 0148       Vitals    Pulse  62  63  58  61  64    Heart Rate Source  --  --  --  Monitor  --    Resp  --  --  --  13  --    Resp Rate Source  --  --  --  Monitor  --    BP  131/89  129/68  129/66  128/74  131/76    Noninvasive MAP (mmHg)  102   86  84  91  93    BP Location  --  --  --  Left arm  --    BP Method  --  --  --  Automatic  --    Patient Position  --  --  --  Lying  --       Oxygen Therapy    SpO2  95 %  95 %  99 %  95 %  97 %    Pulse Oximetry Type  --  --  --  Continuous  --    Device (Oxygen Therapy)  --  --  --  room air  --    Row Name 08/08/19 0135 08/08/19 0120 08/08/19 0106 08/08/19 0050 08/08/19 0035       Vitals    Pulse  60  61  61  59  65    Heart Rate Source  --  --  Monitor  --  --    Resp  --  --  15  --  --    Resp Rate Source  --  --  Monitor  --  --    BP  128/76  112/71  124/77  123/75  128/84    Noninvasive MAP (mmHg)  91  84  90  90  98    BP Location  --  --  Left arm  --  --    BP Method  --  --  Automatic  --  --    Patient Position  --  --  Lying  --  --       Oxygen Therapy    SpO2  97 %  97 %  95 %  96 %  97 %    Pulse Oximetry Type  --  --  Continuous  --  --    Device (Oxygen Therapy)  --  --  room air  --  --    Row Name 08/08/19 0020 08/08/19 0016 08/08/19 0003 08/07/19 2350 08/07/19 2335       Vitals    Temp  --  --  98 °F (36.7 °C)  --  --    Temp src  --  --  Oral  --  --    Pulse  67  --  60  62  59    Heart Rate Source  --  --  Monitor  --  --    Resp  --  --  16  --  --    Resp Rate Source  --  --  Monitor  --  --    BP  120/74  --  140/86  146/85  139/82    Noninvasive MAP (mmHg)  89  --  103  105  98    BP Location  --  --  Left arm  --  --    BP Method  --  --  Automatic  --  --    Patient Position  --  --  Lying  --  --       Oxygen Therapy    SpO2  98 %  --  96 %  95 %  95 %    Pulse Oximetry Type  --  --  Continuous  --  --    Device (Oxygen Therapy)  --  room air  room air  --  --    Row Name 08/07/19 2320 08/07/19 2305 08/07/19 2250 08/07/19 2235 08/07/19 2220       Vitals    Pulse  67  60  61  61  63    Heart Rate Source  --  Monitor  --  --  --    Resp  --  14  --  --  --    Resp Rate Source  --  Monitor  --  --  --    BP  130/80  135/84  120/82  125/83  139/84    Noninvasive MAP (mmHg)  97  100   94  97  102    BP Location  --  Left arm  --  --  --    BP Method  --  Automatic  --  --  --    Patient Position  --  Lying  --  --  --       Oxygen Therapy    SpO2  94 %  96 %  94 %  94 %  93 %    Pulse Oximetry Type  --  Continuous  --  --  --    Device (Oxygen Therapy)  --  room air  --  --  --    Row Name 08/07/19 2205 08/07/19 2150 08/07/19 2135 08/07/19 2120 08/07/19 2109       Vitals    Pulse  74  68  68  76  72    Heart Rate Source  Monitor  --  --  --  --    Resp  16  --  --  --  --    Resp Rate Source  Monitor  --  --  --  --    BP  93/78  127/72  144/85  153/90  100/72    Noninvasive MAP (mmHg)  85  89  102  109  --    BP Location  Left arm  --  --  --  --    BP Method  Automatic  --  --  --  --    Patient Position  Lying  --  --  --  --       Oxygen Therapy    SpO2  93 %  95 %  96 %  100 %  --    Pulse Oximetry Type  Continuous  --  --  --  --    Device (Oxygen Therapy)  room air  --  --  --  --    Row Name 08/07/19 2105 08/07/19 2035 08/07/19 2020 08/07/19 2010 08/07/19 2005       Vitals    Temp  --  --  --  97.9 °F (36.6 °C)  --    Temp src  --  --  --  Oral  --    Pulse  78  75  69  --  67    Heart Rate Source  Monitor  --  --  --  Monitor    Resp  15  --  --  --  18    Resp Rate Source  Monitor  --  --  --  Monitor    BP  100/72  159/86  159/84  --  157/101  (Abnormal)     Noninvasive MAP (mmHg)  79  109  107  --  119    BP Location  Left arm  --  --  --  Left arm    BP Method  Automatic  --  --  --  Automatic    Patient Position  Lying  --  --  --  Lying       Oxygen Therapy    SpO2  96 %  100 %  100 %  --  97 %    Pulse Oximetry Type  Continuous  --  --  --  Continuous    Device (Oxygen Therapy)  room air  --  --  --  room air    Row Name 08/07/19 1950 08/07/19 1935 08/07/19 1920 08/07/19 1910 08/07/19 1833       Vitals    Pulse  67  72  74  83  71    BP  163/98  152/67  152/89  123/68  134/92    Noninvasive MAP (mmHg)  118  92  106  85  106       Oxygen Therapy    SpO2  98 %  97 %  97 %  94 %  97  "%    Row Name 08/07/19 1828 08/07/19 1800 08/07/19 1735 08/07/19 1720 08/07/19 1705       Vitals    Pulse  68  --  70  73  72    BP  123/76  -- PICC in process  137/84  136/75  134/77    Noninvasive MAP (mmHg)  89  --  100  94  94       Oxygen Therapy    SpO2  97 %  --  96 %  97 %  97 %    Row Name 08/07/19 1650 08/07/19 1640 08/07/19 1635 08/07/19 1620 08/07/19 1605       Vitals    Pulse  71  73  79  70  70    BP  135/79  141/76  92/78  154/84  134/84    Noninvasive MAP (mmHg)  97  95  85  104  102       Oxygen Therapy    SpO2  98 %  99 %  92 %  98 %  98 %    Row Name 08/07/19 1550 08/07/19 1530 08/07/19 1525 08/07/19 1505 08/07/19 1500       Vitals    Pulse  68  70  71  72  72    BP  150/88  143/79  152/79  137/88  142/86    Noninvasive MAP (mmHg)  107  98  100  103  103       Oxygen Therapy    SpO2  99 %  99 %  98 %  98 %  98 %    Row Name 08/07/19 1455 08/07/19 1450 08/07/19 1435 08/07/19 1415 08/07/19 1400       Height and Weight    Height  165.1 cm (65\")  --  --  --  --    Height Method  Actual  --  --  --  --    Ideal Body Weight (IBW) (kg)  62.51  --  --  --  --    Weight in (lb) to have BMI = 25  149.9  --  --  --  --       Vitals    Temp  98.7 °F (37.1 °C)  --  --  --  --    Temp src  Oral  --  --  --  --    Pulse  --  75  80  78  72    BP  --  168/95  165/87  170/87  180/110  (Abnormal)     Noninvasive MAP (mmHg)  --  115  111  109  131       Oxygen Therapy    SpO2  --  96 %  98 %  99 %  95 %    Device (Oxygen Therapy)  --  --  --  --  room air    Row Name 08/07/19 1358 08/07/19 1335 08/07/19 1332 08/07/19 1327 08/07/19 1325       Vitals    Pulse  71  79  83  71  70    BP  147/124  (Abnormal)   168/85  168/85  181/115  (Abnormal)   181/115  (Abnormal)     Noninvasive MAP (mmHg)  132  110  110  136  136       Oxygen Therapy    SpO2  100 %  90 %  96 %  97 %  100 %    Row Name 08/07/19 1320 08/07/19 1317 08/07/19 1310             Height and Weight    Weight  --  --  136 kg (300 lb)         Vitals    Pulse  " "70  71  66      BP  194/102  (Abnormal)   194/102  (Abnormal)   --      Noninvasive MAP (mmHg)  125  125  --         Oxygen Therapy    SpO2  95 %  100 %  99 %          Hospital Medications (all)       Dose Frequency Start End    acetaminophen (TYLENOL) suppository 650 mg 650 mg Every 4 Hours PRN 8/7/2019     Sig - Route: Insert 1 suppository into the rectum Every 4 (Four) Hours As Needed for Fever (temperature greater than 101.5 F or headache). - Rectal    Linked Group 1:  \"Or\" Linked Group Details        acetaminophen (TYLENOL) tablet 650 mg 650 mg Every 4 Hours PRN 8/7/2019     Sig - Route: Take 2 tablets by mouth Every 4 (Four) Hours As Needed for Headache or Fever (fever greater than 101.5 F). - Oral    Linked Group 1:  \"Or\" Linked Group Details        amoxicillin (AMOXIL) capsule 500 mg 500 mg Every 8 Hours Scheduled 8/7/2019 8/14/2019    Sig - Route: Take 1 capsule by mouth Every 8 (Eight) Hours. - Oral    busPIRone (BUSPAR) tablet 5 mg 5 mg Every 12 Hours Scheduled 8/7/2019     Sig - Route: Take 1 tablet by mouth Every 12 (Twelve) Hours. - Oral    carvedilol (COREG) tablet 3.125 mg 3.125 mg Every 12 Hours Scheduled 8/7/2019     Sig - Route: Take 1 tablet by mouth Every 12 (Twelve) Hours. - Oral    cyclobenzaprine (FLEXERIL) tablet 10 mg 10 mg 3 Times Daily PRN 8/7/2019     Sig - Route: Take 1 tablet by mouth 3 (Three) Times a Day As Needed for Muscle Spasms. - Oral    dextrose (D5W) 5 % infusion 369 mL 6 mL/kg × 61.5 kg (Ideal) Continuous PRN 8/7/2019     Sig - Route: Infuse 369 mL into a venous catheter Continuous As Needed ([place] rapid correction of hyponatremia). - Intravenous    docusate sodium (COLACE) capsule 100 mg 100 mg 2 Times Daily PRN 8/7/2019     Sig - Route: Take 1 capsule by mouth 2 (Two) Times a Day As Needed for Constipation. - Oral    hydrALAZINE (APRESOLINE) injection 5 mg 5 mg Every 15 Minutes PRN 8/7/2019     Sig - Route: Infuse 0.25 mL into a venous catheter Every 15 (Fifteen) Minutes " "As Needed for High Blood Pressure. - Intravenous    iopamidol (ISOVUE-370) 76 % injection 150 mL 150 mL Once in Imaging 8/7/2019 8/7/2019    Sig - Route: Infuse 150 mL into a venous catheter Once. - Intravenous    labetalol (NORMODYNE,TRANDATE) injection 10 mg 10 mg Once 8/7/2019     Sig - Route: Infuse 2 mL into a venous catheter 1 (One) Time. - Intravenous    levETIRAcetam (KEPPRA) tablet 500 mg 500 mg 2 Times Daily 8/7/2019     Sig - Route: Take 1 tablet by mouth 2 (Two) Times a Day. - Oral    lidocaine PF 1% (XYLOCAINE) injection 1 mL 1 mL Once 8/7/2019 8/7/2019    Sig - Route: Inject 1 mL as directed 1 (One) Time. - Injection    Cosign for Ordering: Required by Silvestre Littlejohn MD    niCARdipine (CARDENE) 25 mg/250 mL (0.1 mg/mL) 0.9% NS infusion 5-15 mg/hr Titrated 8/7/2019     Sig - Route: Infuse 5-15 mg/hr into a venous catheter Dose Adjusted By Provider As Needed. - Intravenous    ondansetron (ZOFRAN) injection 4 mg 4 mg Every 6 Hours PRN 8/7/2019     Sig - Route: Infuse 2 mL into a venous catheter Every 6 (Six) Hours As Needed for Nausea or Vomiting. - Intravenous    Linked Group 2:  \"Or\" Linked Group Details        ondansetron (ZOFRAN) tablet 4 mg 4 mg Every 6 Hours PRN 8/7/2019     Sig - Route: Take 1 tablet by mouth Every 6 (Six) Hours As Needed for Nausea or Vomiting. - Oral    Linked Group 2:  \"Or\" Linked Group Details        oxyCODONE (ROXICODONE) immediate release tablet 5 mg 5 mg Every 4 Hours PRN 8/7/2019     Sig - Route: Take 1 tablet by mouth Every 4 (Four) Hours As Needed for Moderate Pain . - Oral    oxyCODONE-acetaminophen (PERCOCET) 5-325 MG per tablet 1 tablet 1 tablet Every 6 Hours PRN 8/7/2019 8/17/2019    Sig - Route: Take 1 tablet by mouth Every 6 (Six) Hours As Needed for Moderate Pain . - Oral    sodium chloride (HYPERTONIC) 3 % infusion 25 mL/hr Continuous 8/7/2019 8/8/2019    Sig - Route: Infuse 25 mL/hr into a venous catheter Continuous. - Intravenous    sodium chloride 0.9 " % flush 3 mL 3 mL Every 12 Hours Scheduled 8/7/2019     Sig - Route: Infuse 3 mL into a venous catheter Every 12 (Twelve) Hours. - Intravenous    sodium chloride 0.9 % flush 3-10 mL 3-10 mL As Needed 8/7/2019     Sig - Route: Infuse 3-10 mL into a venous catheter As Needed for Line Care. - Intravenous    ampicillin (PRINCIPEN) capsule 500 mg (Discontinued) 500 mg Every 6 Hours Scheduled 8/7/2019 8/7/2019    Sig - Route: Take 2 capsules by mouth Every 6 (Six) Hours. - Oral            Lab Results (last 24 hours)     Procedure Component Value Units Date/Time    Osmolality, Serum [[id number]]  (Normal) Collected:  08/08/19 0615    Specimen:  Blood Updated:  08/08/19 0724     Osmolality 290 mOsm/kg     Sodium [[id number]]  (Normal) Collected:  08/08/19 0615    Specimen:  Blood Updated:  08/08/19 0642     Sodium 140 mmol/L     POC Glucose Once [[id number]]  (Normal) Collected:  08/08/19 0525    Specimen:  Blood Updated:  08/08/19 0537     Glucose 95 mg/dL      Comment: : [id number] Samir  [facility] ID: [id number]       Osmolality, Serum [[id number]]  (Abnormal) Collected:  08/08/19 0317    Specimen:  Blood Updated:  08/08/19 0418     Osmolality 287 mOsm/kg     Potassium [[id number]]  (Normal) Collected:  08/08/19 0317    Specimen:  Blood Updated:  08/08/19 0347     Potassium 3.8 mmol/L     Sodium [[id number]]  (Normal) Collected:  08/08/19 0317    Specimen:  Blood Updated:  08/08/19 0347     Sodium 141 mmol/L     Osmolality, Serum [[id number]]  (Abnormal) Collected:  08/08/19 0016    Specimen:  Blood Updated:  08/08/19 0223     Osmolality 285 mOsm/kg     Potassium [[id number]]  (Normal) Collected:  08/08/19 0016    Specimen:  Blood Updated:  08/08/19 0046     Potassium 4.2 mmol/L      Comment: Specimen hemolyzed.  Results may be affected.       Sodium [[id number]]  (Normal) Collected:  08/08/19 0016    Specimen:  Blood Updated:  08/08/19 0044     Sodium 139 mmol/L     POC Glucose Once [[id number]]  (Normal) Collected:   08/07/19 2339    Specimen:  Blood Updated:  08/08/19 0000     Glucose 106 mg/dL      Comment: : [id number] Samir Miller ID: [id number]       Osmolality, Serum [[id number]]  (Normal) Collected:  08/07/19 1629    Specimen:  Blood Updated:  08/07/19 1716     Osmolality 289 mOsm/kg     aPTT [[id number]]  (Normal) Collected:  08/07/19 1629    Specimen:  Blood Updated:  08/07/19 1653     PTT 31.6 seconds     Protime-INR [[id number]]  (Normal) Collected:  08/07/19 1629    Specimen:  Blood Updated:  08/07/19 1653     Protime 14.3 Seconds      INR 1.08    Comprehensive Metabolic Panel [[id number]]  (Abnormal) Collected:  08/07/19 1629    Specimen:  Blood Updated:  08/07/19 1652     Glucose 112 mg/dL      BUN 13 mg/dL      Creatinine 0.79 mg/dL      Sodium 139 mmol/L      Potassium 4.0 mmol/L      Chloride 103 mmol/L      CO2 29.0 mmol/L      Calcium 9.4 mg/dL      Total Protein 7.3 g/dL      Albumin 4.40 g/dL      ALT (SGPT) 23 U/L      AST (SGOT) 21 U/L      Alkaline Phosphatase 160 U/L      Total Bilirubin 1.3 mg/dL      eGFR Non African Amer 99 mL/min/1.73      Globulin 2.9 gm/dL      A/G Ratio 1.5 g/dL      BUN/Creatinine Ratio 16.5     Anion Gap 7.0 mmol/L     Narrative:       GFR Normal >60  Chronic Kidney Disease <60  Kidney Failure <15    Potassium [[id number]]  (Normal) Collected:  08/07/19 1629    Specimen:  Blood Updated:  08/07/19 1652     Potassium 4.0 mmol/L     CBC Auto Differential [[id number]]  (Abnormal) Collected:  08/07/19 1629    Specimen:  Blood Updated:  08/07/19 1649     WBC 6.48 10*3/mm3      RBC 4.56 10*6/mm3      Hemoglobin 13.5 g/dL      Hematocrit 41.0 %      MCV 89.9 fL      MCH 29.6 pg      MCHC 32.9 g/dL      RDW 15.0 %      RDW-SD 49.2 fl      MPV 10.0 fL      Platelets 131 10*3/mm3      Neutrophil % 83.3 %      Lymphocyte % 10.0 %      Monocyte % 5.9 %      Eosinophil % 0.3 %      Basophil % 0.2 %      Neutrophils, Absolute 5.40 10*3/mm3      Lymphocytes, Absolute 0.65 10*3/mm3       Monocytes, Absolute 0.38 10*3/mm3      Eosinophils, Absolute 0.02 10*3/mm3      Basophils, Absolute 0.01 10*3/mm3         Imaging Results (last 24 hours)     Procedure Component Value Units Date/Time    CT Angiogram Head With & Without Contrast [[id number]] Collected:  08/07/19 2235     Updated:  08/07/19 2247    Narrative:       EXAMINATION: CT angiogram of the head with contrast 08/07/2019     DOSE: 1052 mGycm. Automated exposure control was utilized to diminish  patient radiation dose..     HISTORY: Intracranial hemorrhage     FINDINGS: Multiple contiguous axial images are obtained through the  [place] at 1 mm intervals following intravenous contrast  administration with reformatted images obtained in the sagittal and  coronal projections from the original dataset as well as MIPS.     Within the left occipital lobe there is a intraparenchymal hemorrhage  measuring approximate 3.2 x 2.9 cm in size. There is also some subdural  hemorrhage layering along the left tentorium cerebelli and extending  into the posterior interhemispheric fissure     The distal vertebral arteries are widely patent. The basilar artery is  normal in caliber. The superior cerebellar and posterior cerebral  arteries are also normal in caliber without evidence of focal  steno-occlusive disease or discrete berry aneurysm.     There is mild calcific plaquing involving the cavernous and supraclinoid  aspect of both ICAs without evidence of high-grade stenosis. The  anterior and middle cerebral arteries are normal in caliber without  evidence of discrete intraluminal thrombus, focal steno-occlusive  disease or discrete berry aneurysm. No discrete vascular malformations  are demonstrated. No evidence of dural venous sinus thrombosis. There is  some asymmetry within the left transverse sinus which could be in part  related to some mass effect related to the rind of edema surrounding the  intraparenchymal hemorrhage. There is also hypodensity  within the right  posterior parietal and occipital lobe which has the appearance of a  subacute infarct.       Impression:       1.. Essentially normal posterior circulation without evidence of focal  steno-occlusive disease or discrete berry aneurysm.  2. Mild calcific plaquing involving the cavernous and supraclinoid  aspect of both ICAs with the anterior circulation otherwise  unremarkable.  3. Left occipital intraparenchymal hematoma with surrounding vasogenic  edema. There is also a subdural component which is parafalcine and left  tentorial in location measuring less than a centimeter in thickness.  This report was finalized on 08/07/2019 22:44 by Dr. Caleb Moreno MD.    CT Head Without Contrast [[id number]] Collected:  08/07/19 1441     Updated:  08/07/19 1455    Narrative:       CT HEAD WO CONTRAST- 8/7/2019 1:48 PM CDT     HISTORY: Intracranial hemorrhage       DOSE LENGTH PRODUCT: 542 mGy cm. Automated exposure control was also  utilized to decrease patient radiation dose.     Technique:   Axial CT of the brain without IV contrast. Sagittal and coronal  reformations are also provided for review. Soft tissue and bone kernels  are available for interpretation.     Comparison: None.     Findings:      Approximately 3.6 x 4.3 x 2.5 cm left occipital intraparenchymal  hematoma (series 7 image image 18 and series 3-image 17). There is a  collar of surrounding mild to moderate vasogenic edema. There is an area  of low-attenuation in the right frontoparietal region which appears to  reflect an area of subacute to chronic ischemia. Acute parafalcine, left  tentorial and left convexity subdural hemorrhage is also identified. The  left cerebral convexity components of the subdural hemorrhage measures  up to 0.6 cm in greatest thickness. Mass effect from this hemorrhage  effaces the left lateral ventricle and there is an approximately 4 mm  left-to-right midline shift. The third ventricle is also effaced.  No  trapping of the right lateral ventricle. The basal cisterns are  symmetric. No subarachnoid hemorrhage identified. No calvarial fracture.     Small retention cyst in the left sphenoid sinus. The paranasal sinuses  are otherwise clear. The mastoids are clear.       Impression:       Impression:    1. Left occipital intraparenchymal hematoma measuring up to 4.3 cm.  2. Acute left cerebral convexity, parafalcine and left tentorial  subdural hemorrhage. This measures up to 0.6 cm in thickness along the  left frontal convexity.  3. Associated 4 mm left-to-right midline shift. No trapping of the right  lateral ventricle.  This report was finalized on 08/07/2019 14:52 by Dr Clayton Samayoa, .        ECG/EMG Results (last 24 hours)     ** No results found for the last 24 hours. **        Orders (last 24 hrs)     Start     Ordered    08/08/19 0600  Sodium  Once      08/08/19 0424    08/08/19 0600  Osmolality, Serum  Once      08/08/19 0424    08/08/19 0538  POC Glucose Once  Once      08/08/19 0525    08/08/19 0001  POC Glucose Once  Once      08/07/19 2339    08/07/19 2200  amoxicillin (AMOXIL) capsule 500 mg  Every 8 Hours Scheduled      08/07/19 1750    08/07/19 2149  Inpatient Admission  Once      08/07/19 2149    08/07/19 2100  busPIRone (BUSPAR) tablet 5 mg  Every 12 Hours Scheduled      08/07/19 1544    08/07/19 2100  carvedilol (COREG) tablet 3.125 mg  Every 12 Hours Scheduled      08/07/19 1544    08/07/19 2100  sodium chloride 0.9 % flush 3 mL  Every 12 Hours Scheduled      08/07/19 1544    08/07/19 2100  levETIRAcetam (KEPPRA) tablet 500 mg  2 Times Daily      08/07/19 1740    08/07/19 2000  iopamidol (ISOVUE-370) 76 % injection 150 mL  Once in Imaging      08/07/19 1913    08/07/19 1915  lidocaine PF 1% (XYLOCAINE) injection 1 mL  Once      08/07/19 1818    08/07/19 1830  ampicillin (PRINCIPEN) capsule 500 mg  Every 6 Hours Scheduled,   Status:  Discontinued      08/07/19 1740    08/07/19 1800  Sodium  Every 6  Hours      08/07/19 1544    08/07/19 1800  Osmolality, Serum  Every 6 Hours      08/07/19 1544    08/07/19 1800  POC Glucose Finger Q6H  Every 6 Hours      08/07/19 1544    08/07/19 1712  PICC Consult  Once     Provider:  (Not yet assigned)    08/07/19 1712    08/07/19 1645  Inpatient Hospitalist Consult  Once     Specialty:  Hospitalist  Provider:  Bi Clemons DO    08/07/19 1644    08/07/19 1643  Manual Differential  Once,   Status:  Canceled      08/07/19 1642    08/07/19 1642  Inpatient Hospitalist Consult  Once,   Status:  Canceled     Specialty:  Hospitalist  Provider:  (Not yet assigned)    08/07/19 1641    08/07/19 1630  sodium chloride (HYPERTONIC) 3 % infusion  Continuous      08/07/19 1544    08/07/19 1600  Vital Signs Every Hour and Per Hospital Policy Based on Patient Condition  Every Hour      08/07/19 1544    08/07/19 1600  Intake and Output  Every Hour      08/07/19 1544    08/07/19 1600  Incentive Spirometry  Every 2 Hours While Awake      08/07/19 1544    08/07/19 1600  Neuro Checks  Every Hour      08/07/19 1544    08/07/19 1600  Vital Signs  Every 4 Hours      08/07/19 1544    08/07/19 1600  Strict Intake & Output  Every 2 Hours      08/07/19 1544    08/07/19 1547  CT Angiogram Head With & Without Contrast  1 Time Imaging      08/07/19 1546    08/07/19 1545  Daily Weights  Daily      08/07/19 1544    08/07/19 1545  Sodium  Every 4 Hours,   Status:  Canceled      08/07/19 1544    08/07/19 1545  Potassium  Every 4 Hours      08/07/19 1544    08/07/19 1544  oxyCODONE-acetaminophen (PERCOCET) 5-325 MG per tablet 1 tablet  Every 6 Hours PRN      08/07/19 1544    08/07/19 1544  Diet Regular  Diet Effective Now      08/07/19 1544    08/07/19 1540  dextrose (D5W) 5 % infusion 369 mL  Continuous PRN      08/07/19 1544    08/07/19 1538  Comprehensive Metabolic Panel  STAT      08/07/19 1544    08/07/19 1538  aPTT  STAT      08/07/19 1544    08/07/19 1538  Protime-INR  STAT      08/07/19 1544     08/07/19 1538  CBC Auto Differential  STAT      08/07/19 1544    08/07/19 1537  Code Status and Medical Interventions:  Continuous      08/07/19 1544    08/07/19 1537  Cardiac Monitoring  Continuous      08/07/19 1544    08/07/19 1537  Continuous Pulse Oximetry  Continuous      08/07/19 1544    08/07/19 1537  Use Mobility Guidelines for Advancement of Activity  Continuous      08/07/19 1544    08/07/19 1537  Height & Weight  Once      08/07/19 1544    08/07/19 1537  POC Glucose Once  Once,   Status:  Canceled      08/07/19 1544    08/07/19 1537  Insert Peripheral IV  Once      08/07/19 1544    08/07/19 1537  Saline Lock & Maintain IV Access  Continuous      08/07/19 1544    08/07/19 1537  Place Sequential Compression Device  Once      08/07/19 1544    08/07/19 1537  Maintain Sequential Compression Device  Continuous      08/07/19 1544    08/07/19 1536  sodium chloride 0.9 % flush 3-10 mL  As Needed      08/07/19 1544    08/07/19 1536  acetaminophen (TYLENOL) tablet 650 mg  Every 4 Hours PRN      08/07/19 1544    08/07/19 1536  acetaminophen (TYLENOL) suppository 650 mg  Every 4 Hours PRN      08/07/19 1544    08/07/19 1536  docusate sodium (COLACE) capsule 100 mg  2 Times Daily PRN      08/07/19 1544    08/07/19 1536  ondansetron (ZOFRAN) tablet 4 mg  Every 6 Hours PRN      08/07/19 1544    08/07/19 1536  ondansetron (ZOFRAN) injection 4 mg  Every 6 Hours PRN      08/07/19 1544    08/07/19 1536  oxyCODONE (ROXICODONE) immediate release tablet 5 mg  Every 4 Hours PRN      08/07/19 1544    08/07/19 1536  cyclobenzaprine (FLEXERIL) tablet 10 mg  3 Times Daily PRN      08/07/19 1544    08/07/19 1535  PT Consult: Eval & Treat As Tolerated; Ambulation  Once      08/07/19 1535    08/07/19 1535  OT Consult: Eval & Treat ADLs  Once      08/07/19 1535    08/07/19 1534  SLP Consult: Eval & Treat Other; slow to respond/bleed  Once      08/07/19 1536    08/07/19 1530  labetalol (NORMODYNE,TRANDATE) injection 10 mg  Once       08/07/19 1437    08/07/19 1437  hydrALAZINE (APRESOLINE) injection 5 mg  Every 15 Minutes PRN      08/07/19 1437    08/07/19 1400  niCARdipine (CARDENE) 25 mg/250 mL (0.1 mg/mL) 0.9% NS infusion  Titrated      08/07/19 1309    08/07/19 1336  Inpatient Case Management  Consult  Once     Provider:  (Not yet assigned)    08/07/19 1335    08/07/19 1310  CT Head Without Contrast  1 Time Imaging      08/07/19 1311          Ventilator/Non-Invasive Ventilation Settings (From admission, onward)    None        Physician Progress Notes (last 24 hours) (Notes from 8/7/2019  8:08 AM through 8/8/2019  8:08 AM)     No notes of this type exist for this encounter.           Consult Notes (last 24 hours) (Notes from 8/7/2019  8:08 AM through 8/8/2019  8:08 AM)      Bi Clemons DO at 8/7/2019  5:33 PM                [facility] Medicine Consult  Consults    Date of Admission: 8/7/2019  Date of Consult: 08/07/19    Primary Care Physician: Rossy Arias APRN  Referring Physician: Dr. Littlejohn  Chief Complaint/Reason for Consultation: Cellulitis of right lower extremity    Subjective   History of Present Illness  This 64-year-old white male was admitted with a chief complaint of headache and nausea.  The patient states that his symptoms began roughly 1 week ago and started with intermittent headaches, blurred vision, difficulty with depth perception as well as nausea and vomiting.  He presented to [facility] emergency department and evaluation there was evidence of acute, spontaneous intracranial hemorrhage the patient was subsequently transported to [facility] for neurosurgical care and management.The hospitalist service was asked to consult regarding a possible cellulitis of the right lower extremity inferior to the right knee.    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  In June of  this year he was admitted to the hospital and had the prosthesis removed and packed with antibiotic spacers and antibiotic beads over drain.  Cultures grew out Enterococcus faecalis sensitive to ampicillin and the patient was placed on IV ampicillin therapy for 6 weeks.  The drain was removed at 3 days postop.  The patient has been in a knee immobilizer since surgery.  He completed his antibiotic course 5 days ago.  At the time of discharge from the hospital the patient was to be nonweightbearing on the right lower extremity.  At the time of admission to our facility there was concern for possible cellulitis of the right lower extremity inferior to the knee.  There was some mild erythema noted.  The patient is unaware if this erythema is chronic or has recently developed.  White blood cell count is within normal limits.  Patient is afebrile without discomfort in the region of interest.  I suspect the area of erythema is a chronic finding.  Out of an abundance of caution the patient will be placed on amoxicillin orally for empiric treatment in the area of mild erythema will be outlined with a marker.        Review of Systems   Constitutional: Positive for activity change.   Eyes: Negative.    Respiratory: Negative.    Cardiovascular: Negative.    Gastrointestinal: Positive for nausea.   Endocrine: Negative.    Genitourinary: Negative.    Musculoskeletal: Negative.    Skin: Negative.    Allergic/Immunologic: Negative.    Neurological: Positive for headaches.   Hematological: Negative.    Psychiatric/Behavioral: Negative.       Otherwise complete ROS is negative except as mentioned above.    Past Medical History:   Past Medical History:   Diagnosis Date   • Cellulitis of right lower extremity    • Chronic pain syndrome    • CVA (cerebral vascular accident) (CMS/[facility]) 2011   • Depressive disorder    • GERD (gastroesophageal reflux disease)    • Hypertension    • Injury of lower leg    • Kidney stone    • Morbid obesity  (CMS/[facility])    • Painful total knee replacement, initial encounter (CMS/HCC) 3/6/2019   • Pyogenic arthritis of right knee joint (CMS/HCC) 3/6/2019   • Right knee pain      Past Surgical History:  Past Surgical History:   Procedure Laterality Date   • AMPUTATION Left 2010   • AORTIC VALVE REPAIR/REPLACEMENT  2011   • APPENDECTOMY     • CARDIAC CATHETERIZATION     • REPLACEMENT TOTAL KNEE Right 2011   • TONSILLECTOMY     • TOTAL KNEE  PROSTHESIS REMOVAL W/ SPACER INSERTION Right 6/18/2019    Procedure: REMOVAL OF TOTAL KNEE COMPONENTS RIGHT KNEE WITH INSERTION OF CEMENT ANTIBIOTIC SPACER;  Surgeon: Rufino Lu MD;  Location: [facility];  Service: Orthopedics     Social History:  reports that he has quit smoking. He has quit using smokeless tobacco. He reports that he does not drink alcohol or use drugs.    Family History: family history is not on file.     Allergies: No Known Allergies  Medications: Scheduled Meds:    amoxicillin 500 mg Oral Q8H   busPIRone 5 mg Oral Q12H   carvedilol 3.125 mg Oral Q12H   labetalol 10 mg Intravenous Once   levETIRAcetam 500 mg Oral BID   sodium chloride 3 mL Intravenous Q12H     Continuous Infusions:    dextrose 6 mL/kg (Ideal)    niCARdipine 5-15 mg/hr Last Rate: 15 mg/hr (08/07/19 1630)   sodium chloride 25 mL/hr      PRN Meds:.•  acetaminophen **OR** acetaminophen  •  cyclobenzaprine  •  dextrose  •  docusate sodium  •  hydrALAZINE  •  ondansetron **OR** ondansetron  •  oxyCODONE  •  oxyCODONE-acetaminophen  •  sodium chloride    Objective   Objective    Physical Exam:   Temp:  [98.7 °F (37.1 °C)] 98.7 °F (37.1 °C)  Heart Rate:  [66-83] 71  BP: ()/() 135/79  Physical Exam   Constitutional: He is oriented to person, place, and time. He appears well-developed and well-nourished. He is cooperative.   HENT:   Head: Normocephalic and atraumatic.   Right Ear: External ear normal.   Left Ear: External ear normal.   Nose: Nose normal.   Mouth/Throat: Oropharynx is clear  and moist.   Eyes: Conjunctivae and EOM are normal. Pupils are equal, round, and reactive to light. No scleral icterus.   Neck: Normal range of motion. Neck supple. No JVD present. No tracheal deviation present.   Cardiovascular: Normal rate, regular rhythm and intact distal pulses.   Murmur (on right) heard.   Systolic murmur is present with a grade of 4/6.  Pulmonary/Chest: Effort normal and breath sounds normal. No respiratory distress.   Abdominal: Soft. Bowel sounds are normal. He exhibits no mass. There is no tenderness.   Musculoskeletal: Normal range of motion. He exhibits no edema or tenderness.   Left BKA.    Neurological: He is alert and oriented to person, place, and time. No cranial nerve deficit. Coordination normal.   Skin: Skin is warm and dry. There is erythema (mild area below right knee).        There is a well healed vertical scar over the right knee.   Psychiatric: He has a normal mood and affect. His behavior is normal. Judgment and thought content normal.         Results Reviewed:  I have personally reviewed current lab, radiology, and data and agree with results.  Lab Results (last 24 hours)     Procedure Component Value Units Date/Time    Osmolality, Serum [[id number]]  (Normal) Collected:  08/07/19 1629    Specimen:  Blood Updated:  08/07/19 1716     Osmolality 289 mOsm/kg     aPTT [[id number]]  (Normal) Collected:  08/07/19 1629    Specimen:  Blood Updated:  08/07/19 1653     PTT 31.6 seconds     Protime-INR [[id number]]  (Normal) Collected:  08/07/19 1629    Specimen:  Blood Updated:  08/07/19 1653     Protime 14.3 Seconds      INR 1.08    Comprehensive Metabolic Panel [[id number]]  (Abnormal) Collected:  08/07/19 1629    Specimen:  Blood Updated:  08/07/19 1652     Glucose 112 mg/dL      BUN 13 mg/dL      Creatinine 0.79 mg/dL      Sodium 139 mmol/L      Potassium 4.0 mmol/L      Chloride 103 mmol/L      CO2 29.0 mmol/L      Calcium 9.4 mg/dL      Total Protein 7.3 g/dL      Albumin 4.40  g/dL      ALT (SGPT) 23 U/L      AST (SGOT) 21 U/L      Alkaline Phosphatase 160 U/L      Total Bilirubin 1.3 mg/dL      eGFR Non African Amer 99 mL/min/1.73      Globulin 2.9 gm/dL      A/G Ratio 1.5 g/dL      BUN/Creatinine Ratio 16.5     Anion Gap 7.0 mmol/L     Narrative:       GFR Normal >60  Chronic Kidney Disease <60  Kidney Failure <15    Potassium [[id number]]  (Normal) Collected:  08/07/19 1629    Specimen:  Blood Updated:  08/07/19 1652     Potassium 4.0 mmol/L     CBC Auto Differential [[id number]]  (Abnormal) Collected:  08/07/19 1629    Specimen:  Blood Updated:  08/07/19 1649     WBC 6.48 10*3/mm3      RBC 4.56 10*6/mm3      Hemoglobin 13.5 g/dL      Hematocrit 41.0 %      MCV 89.9 fL      MCH 29.6 pg      MCHC 32.9 g/dL      RDW 15.0 %      RDW-SD 49.2 fl      MPV 10.0 fL      Platelets 131 10*3/mm3      Neutrophil % 83.3 %      Lymphocyte % 10.0 %      Monocyte % 5.9 %      Eosinophil % 0.3 %      Basophil % 0.2 %      Neutrophils, Absolute 5.40 10*3/mm3      Lymphocytes, Absolute 0.65 10*3/mm3      Monocytes, Absolute 0.38 10*3/mm3      Eosinophils, Absolute 0.02 10*3/mm3      Basophils, Absolute 0.01 10*3/mm3         Imaging Results (last 24 hours)     Procedure Component Value Units Date/Time    CT Head Without Contrast [[id number]] Collected:  08/07/19 1441     Updated:  08/07/19 1455    Narrative:       CT HEAD WO CONTRAST- 8/7/2019 1:48 PM CDT     HISTORY: Intracranial hemorrhage       DOSE LENGTH PRODUCT: 542 mGy cm. Automated exposure control was also  utilized to decrease patient radiation dose.     Technique:   Axial CT of the brain without IV contrast. Sagittal and coronal  reformations are also provided for review. Soft tissue and bone kernels  are available for interpretation.     Comparison: None.     Findings:      Approximately 3.6 x 4.3 x 2.5 cm left occipital intraparenchymal  hematoma (series 7 image image 18 and series 3-image 17). There is a  collar of surrounding mild to moderate  vasogenic edema. There is an area  of low-attenuation in the right frontoparietal region which appears to  reflect an area of subacute to chronic ischemia. Acute parafalcine, left  tentorial and left convexity subdural hemorrhage is also identified. The  left cerebral convexity components of the subdural hemorrhage measures  up to 0.6 cm in greatest thickness. Mass effect from this hemorrhage  effaces the left lateral ventricle and there is an approximately 4 mm  left-to-right midline shift. The third ventricle is also effaced. No  trapping of the right lateral ventricle. The basal cisterns are  symmetric. No subarachnoid hemorrhage identified. No calvarial fracture.     Small retention cyst in the left sphenoid sinus. The paranasal sinuses  are otherwise clear. The mastoids are clear.       Impression:       Impression:    1. Left occipital intraparenchymal hematoma measuring up to 4.3 cm.  2. Acute left cerebral convexity, parafalcine and left tentorial  subdural hemorrhage. This measures up to 0.6 cm in thickness along the  left frontal convexity.  3. Associated 4 mm left-to-right midline shift. No trapping of the right  lateral ventricle.  This report was finalized on 08/07/2019 14:52 by Dr Clayton Samayoa, .          Assessment / Plan   Assessment:     Active Hospital Problems    Diagnosis   • **Nontraumatic intracerebral hemorrhage (CMS/HCC)   • Headache   • Nausea   • Hypertension   • Morbid obesity (CMS/HCC)   • Cellulitis of left lower extremity        Plan:   Amoxicillin 500 mg p.o. every 8 hours  The area of erythema on the right lower extremity will be outlined with a marker.  Management of ICH per neurosurgery  We will continue to follow with you    I discussed the patient's findings and my recommendations with the patient and his nurse.    Bi Clemons DO  08/07/19  5:56 PM            Electronically signed by Bi Clemons DO at 8/7/2019  6:07 PM

## 2019-08-08 NOTE — PLAN OF CARE
Problem: Patient Care Overview  Goal: Plan of Care Review  Outcome: Ongoing (interventions implemented as appropriate)   08/08/19 1203   Coping/Psychosocial   Plan of Care Reviewed With patient   Plan of Care Review   Progress improving   OTHER   Outcome Summary OT eval completed. Pt is A&Ox4. Pt required supervision/CGA for supine to sit at EOB. Min A for donning knee immobilizer and Independently able to don LLE prothesis. Pt Min/Mod Ax2 for sit <> stand t/f from EOB with rwx. Required verbal/tactile cues to maintain NWB LLE. Able to take steps from bed to chair with Min/Mod Ax2 and rwx. Pt would benefit from skilled OT services to address decreased strength, balance, activity tolerance, SOB and increased pain. Recommend d/c to SNF for continued rehab.

## 2019-08-08 NOTE — PROGRESS NOTES
[facility] Medicine Services  INPATIENT PROGRESS NOTE    Length of Stay: 1  Date of Admission: 8/7/2019  Primary Care Physician: Rossy Arias APRN    Subjective     Chief Complaint:     Mild headache    HPI     Patient headaches have decreased in intensity significantly.  He has had no nausea no vomiting.  He is tolerating oral antibiotics without difficulty.  The demarcated area of erythema on the right lower extremity indicates regression of the erythema.  Regression of erythema may simply be secondary to increased mobilization since admission as the patient is currently working with physical therapy.  Regardless, the patient will continue on empiric oral antibiotic therapy.    Patient has been bradycardic since reinitiating Coreg.  The patient states that this was a recently started medication that it caused him to be tired.  I believe it would be appropriate to discontinue Coreg in favor of amlodipine 5 mg daily.    Review of Systems     All pertinent negatives and positives are as above. All other systems have been reviewed and are negative unless otherwise stated.     Objective    Temp:  [97.9 °F (36.6 °C)-98.7 °F (37.1 °C)] 97.9 °F (36.6 °C)  Heart Rate:  [55-83] 64  Resp:  [13-18] 16  BP: ()/() 115/87    Lab Results (last 24 hours)     Procedure Component Value Units Date/Time    Potassium [[id number]]  (Normal) Collected:  08/08/19 0816    Specimen:  Blood Updated:  08/08/19 0833     Potassium 3.6 mmol/L     Osmolality, Serum [[id number]]  (Normal) Collected:  08/08/19 0615    Specimen:  Blood Updated:  08/08/19 0724     Osmolality 290 mOsm/kg     Sodium [[id number]]  (Normal) Collected:  08/08/19 0615    Specimen:  Blood Updated:  08/08/19 0642     Sodium 140 mmol/L     POC Glucose Once [[id number]]  (Normal) Collected:  08/08/19 0525    Specimen:  Blood Updated:  08/08/19 0537     Glucose 95 mg/dL      Comment: : [id number] Miller ID:  [id number]       Osmolality, Serum [[id number]]  (Abnormal) Collected:  08/08/19 0317    Specimen:  Blood Updated:  08/08/19 0418     Osmolality 287 mOsm/kg     Potassium [[id number]]  (Normal) Collected:  08/08/19 0317    Specimen:  Blood Updated:  08/08/19 0347     Potassium 3.8 mmol/L     Sodium [[id number]]  (Normal) Collected:  08/08/19 0317    Specimen:  Blood Updated:  08/08/19 0347     Sodium 141 mmol/L     Osmolality, Serum [[id number]]  (Abnormal) Collected:  08/08/19 0016    Specimen:  Blood Updated:  08/08/19 0223     Osmolality 285 mOsm/kg     Potassium [[id number]]  (Normal) Collected:  08/08/19 0016    Specimen:  Blood Updated:  08/08/19 0046     Potassium 4.2 mmol/L      Comment: Specimen hemolyzed.  Results may be affected.       Sodium [[id number]]  (Normal) Collected:  08/08/19 0016    Specimen:  Blood Updated:  08/08/19 0044     Sodium 139 mmol/L     POC Glucose Once [[id number]]  (Normal) Collected:  08/07/19 2339    Specimen:  Blood Updated:  08/08/19 0000     Glucose 106 mg/dL      Comment: [facility] [id number] [id number]       Osmolality, Serum [[id number]]  (Normal) Collected:  08/07/19 1629    Specimen:  Blood Updated:  08/07/19 1716     Osmolality 289 mOsm/kg     aPTT [[id number]]  (Normal) Collected:  08/07/19 1629    Specimen:  Blood Updated:  08/07/19 1653     PTT 31.6 seconds     Protime-INR [[id number]]  (Normal) Collected:  08/07/19 1629    Specimen:  Blood Updated:  08/07/19 1653     Protime 14.3 Seconds      INR 1.08    Comprehensive Metabolic Panel [[id number]]  (Abnormal) Collected:  08/07/19 1629    Specimen:  Blood Updated:  08/07/19 1652     Glucose 112 mg/dL      BUN 13 mg/dL      Creatinine 0.79 mg/dL      Sodium 139 mmol/L      Potassium 4.0 mmol/L      Chloride 103 mmol/L      CO2 29.0 mmol/L      Calcium 9.4 mg/dL      Total Protein 7.3 g/dL      Albumin 4.40 g/dL      ALT (SGPT) 23 U/L      AST (SGOT) 21 U/L      Alkaline Phosphatase 160 U/L      Total  Bilirubin 1.3 mg/dL      eGFR Non African Amer 99 mL/min/1.73      Globulin 2.9 gm/dL      A/G Ratio 1.5 g/dL      BUN/Creatinine Ratio 16.5     Anion Gap 7.0 mmol/L     Narrative:       GFR Normal >60  Chronic Kidney Disease <60  Kidney Failure <15    Potassium [[id number]]  (Normal) Collected:  08/07/19 1629    Specimen:  Blood Updated:  08/07/19 1652     Potassium 4.0 mmol/L     CBC Auto Differential [[id number]]  (Abnormal) Collected:  08/07/19 1629    Specimen:  Blood Updated:  08/07/19 1649     WBC 6.48 10*3/mm3      RBC 4.56 10*6/mm3      Hemoglobin 13.5 g/dL      Hematocrit 41.0 %      MCV 89.9 fL      MCH 29.6 pg      MCHC 32.9 g/dL      RDW 15.0 %      RDW-SD 49.2 fl      MPV 10.0 fL      Platelets 131 10*3/mm3      Neutrophil % 83.3 %      Lymphocyte % 10.0 %      Monocyte % 5.9 %      Eosinophil % 0.3 %      Basophil % 0.2 %      Neutrophils, Absolute 5.40 10*3/mm3      Lymphocytes, Absolute 0.65 10*3/mm3      Monocytes, Absolute 0.38 10*3/mm3      Eosinophils, Absolute 0.02 10*3/mm3      Basophils, Absolute 0.01 10*3/mm3           Imaging Results (last 24 hours)     Procedure Component Value Units Date/Time    CT Angiogram Head With & Without Contrast [[id number]] Collected:  08/07/19 2235     Updated:  08/07/19 2247    Narrative:       EXAMINATION: CT angiogram of the head with contrast 08/07/2019     DOSE: 1052 mGycm. Automated exposure control was utilized to diminish  patient radiation dose..     HISTORY: Intracranial hemorrhage     FINDINGS: Multiple contiguous axial images are obtained through the  [place] at 1 mm intervals following intravenous contrast  administration with reformatted images obtained in the sagittal and  coronal projections from the original dataset as well as MIPS.     Within the left occipital lobe there is a intraparenchymal hemorrhage  measuring approximate 3.2 x 2.9 cm in size. There is also some subdural  hemorrhage layering along the left tentorium cerebelli and  extending  into the posterior interhemispheric fissure     The distal vertebral arteries are widely patent. The basilar artery is  normal in caliber. The superior cerebellar and posterior cerebral  arteries are also normal in caliber without evidence of focal  steno-occlusive disease or discrete berry aneurysm.     There is mild calcific plaquing involving the cavernous and supraclinoid  aspect of both ICAs without evidence of high-grade stenosis. The  anterior and middle cerebral arteries are normal in caliber without  evidence of discrete intraluminal thrombus, focal steno-occlusive  disease or discrete berry aneurysm. No discrete vascular malformations  are demonstrated. No evidence of dural venous sinus thrombosis. There is  some asymmetry within the left transverse sinus which could be in part  related to some mass effect related to the rind of edema surrounding the  intraparenchymal hemorrhage. There is also hypodensity within the right  posterior parietal and occipital lobe which has the appearance of a  subacute infarct.       Impression:       1.. Essentially normal posterior circulation without evidence of focal  steno-occlusive disease or discrete berry aneurysm.  2. Mild calcific plaquing involving the cavernous and supraclinoid  aspect of both ICAs with the anterior circulation otherwise  unremarkable.  3. Left occipital intraparenchymal hematoma with surrounding vasogenic  edema. There is also a subdural component which is parafalcine and left  tentorial in location measuring less than a centimeter in thickness.  This report was finalized on 08/07/2019 22:44 by Dr. Caleb Moreno MD.    CT Head Without Contrast [[id number]] Collected:  08/07/19 1441     Updated:  08/07/19 1455    Narrative:       CT HEAD WO CONTRAST- 8/7/2019 1:48 PM CDT     HISTORY: Intracranial hemorrhage       DOSE LENGTH PRODUCT: 542 mGy cm. Automated exposure control was also  utilized to decrease patient radiation dose.      Technique:   Axial CT of the brain without IV contrast. Sagittal and coronal  reformations are also provided for review. Soft tissue and bone kernels  are available for interpretation.     Comparison: None.     Findings:      Approximately 3.6 x 4.3 x 2.5 cm left occipital intraparenchymal  hematoma (series 7 image image 18 and series 3-image 17). There is a  collar of surrounding mild to moderate vasogenic edema. There is an area  of low-attenuation in the right frontoparietal region which appears to  reflect an area of subacute to chronic ischemia. Acute parafalcine, left  tentorial and left convexity subdural hemorrhage is also identified. The  left cerebral convexity components of the subdural hemorrhage measures  up to 0.6 cm in greatest thickness. Mass effect from this hemorrhage  effaces the left lateral ventricle and there is an approximately 4 mm  left-to-right midline shift. The third ventricle is also effaced. No  trapping of the right lateral ventricle. The basal cisterns are  symmetric. No subarachnoid hemorrhage identified. No calvarial fracture.     Small retention cyst in the left sphenoid sinus. The paranasal sinuses  are otherwise clear. The mastoids are clear.       Impression:       Impression:    1. Left occipital intraparenchymal hematoma measuring up to 4.3 cm.  2. Acute left cerebral convexity, parafalcine and left tentorial  subdural hemorrhage. This measures up to 0.6 cm in thickness along the  left frontal convexity.  3. Associated 4 mm left-to-right midline shift. No trapping of the right  lateral ventricle.  This report was finalized on 08/07/2019 14:52 by Dr Clayton Samayoa, .             Intake/Output Summary (Last 24 hours) at 8/8/2019 0853  Last data filed at 8/8/2019 0542  Gross per 24 hour   Intake 1350.3 ml   Output 2100 ml   Net -749.7 ml       Physical Exam    Constitutional: He is oriented to person, place, and time. He appears well-developed and well-nourished. He is  cooperative.  He is morbidly obese.  HENT:   Head: Normocephalic and atraumatic.   Nose: Nose normal.   Mouth/Throat: Oropharynx is clear and moist.   Eyes: Conjunctivae are normal. . No scleral icterus.   Neck: Normal range of motion. Neck supple. No JVD present.   Cardiovascular: Normal rate, regular rhythm and intact distal pulses.   Murmur (on right) heard.   Systolic murmur is present with a grade of 4/6.  Pulmonary/Chest: Effort normal and breath sounds normal. No respiratory distress.   Abdominal: Soft. Bowel sounds are normal. He exhibits no mass. There is no tenderness.   Musculoskeletal: Normal range of motion. He exhibits no edema or tenderness.   Left BKA.    Neurological: He is alert and oriented to person, place, and time. Coordination normal.   Skin: Skin is warm and dry. There is erythema (mild area below right knee).        There is a well healed vertical scar over the right knee.   Psychiatric: He has a normal mood and affect.          Results Review:  I have reviewed the labs, radiology results, and diagnostic studies since my last progress note and made treatment changes reflective of the results.   I have reviewed the current medications.    Assessment/Plan     Active Hospital Problems    Diagnosis   • **Nontraumatic intracerebral hemorrhage (CMS/HCC)   • Headache   • Nausea   • Hypertension   • Morbid obesity (CMS/HCC)   • Cellulitis of left lower extremity   • Intracerebral hemorrhage (CMS/HCC)       PLAN:  Continue amoxicillin  Management of ICH per Neurosurgery  Discontinue Coreg  Amlodipine 5 mg p.o. daily    Bi Clemons DO   08/08/19   8:53 AM

## 2019-08-08 NOTE — PLAN OF CARE
Problem: Patient Care Overview  Goal: Plan of Care Review  Outcome: Ongoing (interventions implemented as appropriate)   08/07/19 2004   Coping/Psychosocial   Plan of Care Reviewed With patient   Plan of Care Review   Progress no change   OTHER   Outcome Summary pt has no c/o pain. Cardene on at 5. 3% started . Picc line placed. reddened area on right lower leg marked. voiding per urinal. NIH 1. CT and CTA of head done. Bedside swallow screen passed.        Problem: Stroke (Hemorrhagic) (Adult)  Goal: Signs and Symptoms of Listed Potential Problems Will be Absent, Minimized or Managed (Stroke)  Outcome: Ongoing (interventions implemented as appropriate)

## 2019-08-08 NOTE — PROGRESS NOTES
Discharge Planning Assessment  [facility]     Patient Name: Michael Sorto  MRN: [id number]  Today's Date: 8/8/2019    Admit Date: 8/7/2019    Discharge Needs Assessment     Row Name 08/08/19 1530       Living Environment    Lives With  alone    Current Living Arrangements  home/apartment/condo    Primary Care Provided by  self    Provides Primary Care For  no one    Quality of Family Relationships  unable to assess    Able to Return to Prior Arrangements  yes       Resource/Environmental Concerns    Resource/Environmental Concerns  none       Transition Planning    Patient/Family Anticipates Transition to  home    Patient/Family Anticipated Services at Transition  none       Discharge Needs Assessment    Readmission Within the Last 30 Days  no previous admission in last 30 days    Concerns to be Addressed  no discharge needs identified;patient refuses services    Equipment Currently Used at Home  bath bench;commode;walker, rolling;wheelchair    Anticipated Changes Related to Illness  none    Equipment Needed After Discharge  none    Current Discharge Risk  chronically ill;physical impairment    Discharge Coordination/Progress  SW spoke with patient regarding discharge plan/needs.  Patient resides alone.  Patient has a PCP and RX coverage.  Patient states he does not need rehab placement or HH.  Patient plans to return home at discharge with no anticipated needs.        Discharge Plan    No documentation.       Destination      No service coordination in this encounter.      Durable Medical Equipment      No service coordination in this encounter.      Dialysis/Infusion      No service coordination in this encounter.      Home Medical Care      No service coordination in this encounter.      Therapy      No service coordination in this encounter.      Community Resources      No service coordination in this encounter.          Demographic Summary    No documentation.       Functional Status    No documentation.        Psychosocial    No documentation.       Abuse/Neglect    No documentation.       Legal    No documentation.       Substance Abuse    No documentation.       Patient Forms    No documentation.           COLETTE StringerW

## 2019-08-09 ENCOUNTER — APPOINTMENT (OUTPATIENT)
Dept: CT IMAGING | Facility: HOSPITAL | Age: 64
End: 2019-08-09

## 2019-08-09 LAB
ANION GAP SERPL CALCULATED.3IONS-SCNC: 4 MMOL/L (ref 4–13)
BUN BLD-MCNC: 15 MG/DL (ref 5–21)
BUN/CREAT SERPL: 16 (ref 7–25)
CALCIUM SPEC-SCNC: 8.6 MG/DL (ref 8.4–10.4)
CHLORIDE SERPL-SCNC: 109 MMOL/L (ref 98–110)
CO2 SERPL-SCNC: 28 MMOL/L (ref 24–31)
CREAT BLD-MCNC: 0.94 MG/DL (ref 0.5–1.4)
CRP SERPL-MCNC: 3.03 MG/DL (ref 0–0.99)
GFR SERPL CREATININE-BSD FRML MDRD: 81 ML/MIN/1.73
GLUCOSE BLD-MCNC: 100 MG/DL (ref 70–100)
GLUCOSE BLDC GLUCOMTR-MCNC: 133 MG/DL (ref 70–130)
GLUCOSE BLDC GLUCOMTR-MCNC: 134 MG/DL (ref 70–130)
OSMOLALITY SERPL: 294 MOSM/KG (ref 289–308)
OSMOLALITY SERPL: 296 MOSM/KG (ref 289–308)
POTASSIUM BLD-SCNC: 3.7 MMOL/L (ref 3.5–5.3)
POTASSIUM BLD-SCNC: 4.2 MMOL/L (ref 3.5–5.3)
POTASSIUM BLD-SCNC: 4.2 MMOL/L (ref 3.5–5.3)
SODIUM BLD-SCNC: 141 MMOL/L (ref 135–145)

## 2019-08-09 PROCEDURE — 84295 ASSAY OF SERUM SODIUM: CPT | Performed by: NURSE PRACTITIONER

## 2019-08-09 PROCEDURE — 80048 BASIC METABOLIC PNL TOTAL CA: CPT | Performed by: NURSE PRACTITIONER

## 2019-08-09 PROCEDURE — 83930 ASSAY OF BLOOD OSMOLALITY: CPT | Performed by: NURSE PRACTITIONER

## 2019-08-09 PROCEDURE — 94799 UNLISTED PULMONARY SVC/PX: CPT

## 2019-08-09 PROCEDURE — 25010000002 HYDRALAZINE PER 20 MG: Performed by: NEUROLOGICAL SURGERY

## 2019-08-09 PROCEDURE — 70450 CT HEAD/BRAIN W/O DYE: CPT

## 2019-08-09 PROCEDURE — 84132 ASSAY OF SERUM POTASSIUM: CPT | Performed by: NURSE PRACTITIONER

## 2019-08-09 PROCEDURE — 86140 C-REACTIVE PROTEIN: CPT | Performed by: NURSE PRACTITIONER

## 2019-08-09 PROCEDURE — 25010000002 ONDANSETRON PER 1 MG: Performed by: NURSE PRACTITIONER

## 2019-08-09 PROCEDURE — 99231 SBSQ HOSP IP/OBS SF/LOW 25: CPT | Performed by: NURSE PRACTITIONER

## 2019-08-09 PROCEDURE — 82962 GLUCOSE BLOOD TEST: CPT

## 2019-08-09 RX ORDER — 3% SODIUM CHLORIDE 3 G/100ML
25 INJECTION, SOLUTION INTRAVENOUS CONTINUOUS
Status: DISCONTINUED | OUTPATIENT
Start: 2019-08-09 | End: 2019-08-09

## 2019-08-09 RX ORDER — DEXTROSE MONOHYDRATE 50 MG/ML
6 INJECTION, SOLUTION INTRAVENOUS CONTINUOUS PRN
Status: DISCONTINUED | OUTPATIENT
Start: 2019-08-09 | End: 2019-08-09

## 2019-08-09 RX ORDER — TERAZOSIN 5 MG/1
5 CAPSULE ORAL DAILY
Status: DISCONTINUED | OUTPATIENT
Start: 2019-08-09 | End: 2019-08-10 | Stop reason: HOSPADM

## 2019-08-09 RX ADMIN — BUSPIRONE HYDROCHLORIDE 5 MG: 5 TABLET ORAL at 10:21

## 2019-08-09 RX ADMIN — ONDANSETRON 4 MG: 2 INJECTION INTRAMUSCULAR; INTRAVENOUS at 05:27

## 2019-08-09 RX ADMIN — LEVETIRACETAM 500 MG: 500 TABLET, FILM COATED ORAL at 10:22

## 2019-08-09 RX ADMIN — HYDRALAZINE HYDROCHLORIDE 5 MG: 20 INJECTION INTRAMUSCULAR; INTRAVENOUS at 04:12

## 2019-08-09 RX ADMIN — AMOXICILLIN 500 MG: 500 CAPSULE ORAL at 05:13

## 2019-08-09 RX ADMIN — SODIUM CHLORIDE 25 ML/HR: 3 INJECTION, SOLUTION INTRAVENOUS at 00:55

## 2019-08-09 RX ADMIN — SODIUM CHLORIDE 10 MG/HR: 9 INJECTION, SOLUTION INTRAVENOUS at 09:09

## 2019-08-09 RX ADMIN — TERAZOSIN HYDROCHLORIDE 5 MG: 5 CAPSULE ORAL at 10:21

## 2019-08-09 RX ADMIN — AMLODIPINE BESYLATE 5 MG: 5 TABLET ORAL at 10:21

## 2019-08-09 RX ADMIN — SODIUM CHLORIDE, PRESERVATIVE FREE 3 ML: 5 INJECTION INTRAVENOUS at 10:21

## 2019-08-09 RX ADMIN — LEVETIRACETAM 500 MG: 500 TABLET, FILM COATED ORAL at 22:59

## 2019-08-09 RX ADMIN — HYDRALAZINE HYDROCHLORIDE 5 MG: 20 INJECTION INTRAMUSCULAR; INTRAVENOUS at 05:13

## 2019-08-09 RX ADMIN — OXYCODONE HYDROCHLORIDE AND ACETAMINOPHEN 1 TABLET: 5; 325 TABLET ORAL at 05:51

## 2019-08-09 RX ADMIN — AMOXICILLIN 500 MG: 500 CAPSULE ORAL at 23:00

## 2019-08-09 RX ADMIN — AMOXICILLIN 500 MG: 500 CAPSULE ORAL at 13:36

## 2019-08-09 RX ADMIN — BUSPIRONE HYDROCHLORIDE 5 MG: 5 TABLET ORAL at 22:59

## 2019-08-09 RX ADMIN — SODIUM CHLORIDE, PRESERVATIVE FREE 3 ML: 5 INJECTION INTRAVENOUS at 23:00

## 2019-08-09 RX ADMIN — SODIUM CHLORIDE 5 MG/HR: 9 INJECTION, SOLUTION INTRAVENOUS at 05:57

## 2019-08-09 RX ADMIN — OXYCODONE HYDROCHLORIDE AND ACETAMINOPHEN 1 TABLET: 5; 325 TABLET ORAL at 17:40

## 2019-08-09 NOTE — PROGRESS NOTES
[facility] Medicine Services  INPATIENT PROGRESS NOTE    Length of Stay: 2  Date of Admission: 8/7/2019  Primary Care Physician: Rossy Arias APRN    Subjective     Chief Complaint:     Mild headache    HPI     Patient has no specific complaints today.  No nausea or vomiting is noted.  He continues to tolerate amoxicillin well.  Blood pressure is in better control.  Cardene has been off now for half an hour and remains in the 130s/80s.  I will add another medication to his current regimen in order to maintain systolic pressures well below 140.  There is been no further bradycardia since discontinuing Coreg.  He is doing well on amlodipine with no side effects at present.  Erythema of the right lower extremity is significantly improved which I believe can be attributed to consistent elevation as well as oral antibiotic treatment.    Review of Systems     All pertinent negatives and positives are as above. All other systems have been reviewed and are negative unless otherwise stated.     Objective    Temp:  [97.6 °F (36.4 °C)-98.7 °F (37.1 °C)] 98.2 °F (36.8 °C)  Heart Rate:  [53-91] 74  Resp:  [13-21] 14  BP: ()/() 133/80    Lab Results (last 24 hours)     Procedure Component Value Units Date/Time    C-reactive Protein [[id number]] Collected:  08/09/19 0739    Specimen:  Blood Updated:  08/09/19 0916    Potassium [[id number]]  (Normal) Collected:  08/09/19 0739    Specimen:  Blood Updated:  08/09/19 0817     Potassium 3.7 mmol/L     POC Glucose Once [[id number]]  (Abnormal) Collected:  08/09/19 0627    Specimen:  Blood Updated:  08/09/19 0640     Glucose 134 mg/dL      Comment: : [id number] [facility] [place]: [id number]       Osmolality, Serum [[id number]]  (Normal) Collected:  08/09/19 0521    Specimen:  Blood Updated:  08/09/19 0608     Osmolality 294 mOsm/kg     Sodium [[id number]]  (Normal) Collected:  08/09/19 0521    Specimen:  Blood Updated:  08/09/19  0555     Sodium 141 mmol/L     Basic Metabolic Panel [[id number]]  (Normal) Collected:  08/09/19 0342    Specimen:  Blood Updated:  08/09/19 0436     Glucose 100 mg/dL      BUN 15 mg/dL      Creatinine 0.94 mg/dL      Sodium 141 mmol/L      Potassium 4.2 mmol/L      Chloride 109 mmol/L      CO2 28.0 mmol/L      Calcium 8.6 mg/dL      eGFR Non African Amer 81 mL/min/1.73      BUN/Creatinine Ratio 16.0     Anion Gap 4.0 mmol/L     Narrative:       GFR Normal >60  Chronic Kidney Disease <60  Kidney Failure <15    Osmolality, Serum [[id number]]  (Normal) Collected:  08/08/19 2347    Specimen:  Blood Updated:  08/09/19 0041     Osmolality 296 mOsm/kg     Sodium [[id number]]  (Normal) Collected:  08/08/19 2347    Specimen:  Blood Updated:  08/09/19 0022     Sodium 141 mmol/L     Potassium [[id number]]  (Normal) Collected:  08/08/19 2347    Specimen:  Blood Updated:  08/09/19 0022     Potassium 4.2 mmol/L     POC Glucose Once [[id number]]  (Abnormal) Collected:  08/08/19 2353    Specimen:  Blood Updated:  08/09/19 0004     Glucose 133 mg/dL      Comment: : [id number] [facility] [place] ID: [id number]       Potassium [[id number]]  (Normal) Collected:  08/08/19 1958    Specimen:  Blood Updated:  08/08/19 2035     Potassium 3.9 mmol/L     Sodium [[id number]]  (Normal) Collected:  08/08/19 1807    Specimen:  Blood Updated:  08/08/19 1848     Sodium 139 mmol/L     POC Glucose Once [[id number]]  (Normal) Collected:  08/08/19 1648    Specimen:  Blood Updated:  08/08/19 1710     Glucose 105 mg/dL      Comment: : [id number] [facility] [place] ID: [id number]       Osmolality, Serum [[id number]]  (Normal) Collected:  08/08/19 1553    Specimen:  Blood Updated:  08/08/19 1633     Osmolality 293 mOsm/kg     Potassium [[id number]]  (Normal) Collected:  08/08/19 1553    Specimen:  Blood Updated:  08/08/19 1618     Potassium 3.8 mmol/L     POC Glucose Once [[id number]]  (Normal) Collected:  08/08/19 1355    Specimen:  Blood Updated:   08/08/19 1407     Glucose 113 mg/dL      Comment: : [id number] [facility] [place]eter ID: [id number]       Osmolality, Serum [[id number]]  (Normal) Collected:  08/08/19 1121    Specimen:  Blood Updated:  08/08/19 1220     Osmolality 292 mOsm/kg     Sodium [[id number]]  (Normal) Collected:  08/08/19 1121    Specimen:  Blood Updated:  08/08/19 1150     Sodium 140 mmol/L     Potassium [[id number]]  (Normal) Collected:  08/08/19 1121    Specimen:  Blood Updated:  08/08/19 1150     Potassium 3.9 mmol/L           Imaging Results (last 24 hours)     ** No results found for the last 24 hours. **             Intake/Output Summary (Last 24 hours) at 8/9/2019 0931  Last data filed at 8/9/2019 0400  Gross per 24 hour   Intake 1465.58 ml   Output 1875 ml   Net -409.42 ml       Physical Exam    Constitutional: He is oriented to person, place, and time. He appears well-developed and well-nourished. He is cooperative.  He is morbidly obese.  HENT:   Head: Normocephalic and atraumatic.   Nose: Nose normal.   Mouth/Throat: Oropharynx is clear and moist.   Eyes: Conjunctivae are normal. . No scleral icterus.   Neck: Normal range of motion. Neck supple. No JVD present.   Cardiovascular: Normal rate, regular rhythm and intact distal pulses.   Murmur (on right) heard.   Systolic murmur is present with a grade of 4/6.  Pulmonary/Chest: Effort normal and breath sounds normal. No respiratory distress.   Abdominal: Soft. Bowel sounds are normal. He exhibits no mass. There is no tenderness.   Musculoskeletal: Normal range of motion. He exhibits no edema or tenderness.   Left BKA.    Neurological: He is alert and oriented to person, place, and time. Coordination normal.   Skin: Skin is warm and dry. There is significantly decreased erythema below the right knee.   There is a well healed vertical scar over the right knee.   Psychiatric: He has a normal mood and affect.          Results Review:  I have reviewed the labs, radiology results, and  diagnostic studies since my last progress note and made treatment changes reflective of the results.   I have reviewed the current medications.    Assessment/Plan     Active Hospital Problems    Diagnosis   • **Nontraumatic intracerebral hemorrhage (CMS/HCC)   • Headache   • Nausea   • Hypertension   • Morbid obesity (CMS/HCC)   • Cellulitis of left lower extremity   • Intracerebral hemorrhage (CMS/HCC)       PLAN:  Continue amoxicillin  Add terazosin 5 mg p.o. daily to current regimen  Agree with transfer to the floor once systolic blood pressure is consistently less than 140    Bi Clemons DO   08/09/19   9:31 AM

## 2019-08-09 NOTE — PAYOR COMM NOTE
"8/8/19 CLINCIAL UPDATE  UR  826 0421  PHONE     Shirley Sorto (64 y.o. Male)     Date of Birth Social Security Number Address Home Phone MRN    1955  [id number] [facility] [address] [phone] [id number]    Faith Marital Status          [facility] Single       Admission Date Admission Type Admitting Provider Attending Provider Department, Room/Bed    8/7/19 Urgent Silvestre Littlejohn MD [place], William Lee, MD [facility] CARDIAC CARE, C005/1    Discharge Date Discharge Disposition Discharge Destination                       Attending Provider:  Silvestre Littlejohn MD    Allergies:  No Known Allergies    Isolation:  None   Infection:  None   Code Status:  CPR    Ht:  165.1 cm (65\")   Wt:  142 kg (313 lb)    Admission Cmt:  None   Principal Problem:  Nontraumatic intracerebral hemorrhage (CMS/HCC) [I61.9]                 Active Insurance as of 8/7/2019     Primary Coverage     Payor Plan Insurance Group Employer/Plan Group    HUMANA MEDICARE REPLACEMENT HUMANA MEDICARE REPL [id number]     Payor Plan Address Payor Plan Phone Number Payor Plan Fax Number Effective Dates    [address] [address] [phone]  8/1/2019 - None Entered    [facility] [phone]       Subscriber Name Subscriber Birth Date Member ID       SHIRLEY SORTO 1955 [id number]                 Emergency Contacts      (Rel.) Home Phone Work Phone Mobile Phone    ZACH ELIZABETH (Friend) -- -- [phone]    Stephanie Elizabeth (Other) -- -- [phone]              Lab Results (last 24 hours)     Procedure Component Value Units Date/Time    POC Glucose Once [[id number]]  (Abnormal) Collected:  08/09/19 0627    Specimen:  Blood Updated:  08/09/19 0640     Glucose 134 mg/dL      Comment: : [id number] [employer] ID: [id number]       Osmolality, Serum [[id number]]  (Normal) Collected:  08/09/19 0521    Specimen:  Blood Updated:  08/09/19 0608     Osmolality 294 mOsm/kg     " Sodium [[id number]]  (Normal) Collected:  08/09/19 0521    Specimen:  Blood Updated:  08/09/19 0555     Sodium 141 mmol/L     Basic Metabolic Panel [[id number]]  (Normal) Collected:  08/09/19 0342    Specimen:  Blood Updated:  08/09/19 0436     Glucose 100 mg/dL      BUN 15 mg/dL      Creatinine 0.94 mg/dL      Sodium 141 mmol/L      Potassium 4.2 mmol/L      Chloride 109 mmol/L      CO2 28.0 mmol/L      Calcium 8.6 mg/dL      eGFR Non African Amer 81 mL/min/1.73      BUN/Creatinine Ratio 16.0     Anion Gap 4.0 mmol/L     Narrative:       GFR Normal >60  Chronic Kidney Disease <60  Kidney Failure <15    Osmolality, Serum [[id number]]  (Normal) Collected:  08/08/19 2347    Specimen:  Blood Updated:  08/09/19 0041     Osmolality 296 mOsm/kg     Sodium [[id number]]  (Normal) Collected:  08/08/19 2347    Specimen:  Blood Updated:  08/09/19 0022     Sodium 141 mmol/L     Potassium [[id number]]  (Normal) Collected:  08/08/19 2347    Specimen:  Blood Updated:  08/09/19 0022     Potassium 4.2 mmol/L     POC Glucose Once [[id number]]  (Abnormal) Collected:  08/08/19 2353    Specimen:  Blood Updated:  08/09/19 0004     Glucose 133 mg/dL      Comment: : [id number] [facility] ID: [id number]       Potassium [[id number]]  (Normal) Collected:  08/08/19 1958    Specimen:  Blood Updated:  08/08/19 2035     Potassium 3.9 mmol/L     Sodium [[id number]]  (Normal) Collected:  08/08/19 1807    Specimen:  Blood Updated:  08/08/19 1848     Sodium 139 mmol/L     POC Glucose Once [[id number]]  (Normal) Collected:  08/08/19 1648    Specimen:  Blood Updated:  08/08/19 1710     Glucose 105 mg/dL      Comment: : [id number] [employer] ID: [id number]       Osmolality, Serum [[id number]]  (Normal) Collected:  08/08/19 1553    Specimen:  Blood Updated:  08/08/19 1633     Osmolality 293 mOsm/kg     Potassium [[id number]]  (Normal) Collected:  08/08/19 1553    Specimen:  Blood Updated:  08/08/19 1618     Potassium 3.8 mmol/L     POC  Glucose Once [[id number]]  (Normal) Collected:  19 1355    Specimen:  Blood Updated:  19 1407     Glucose 113 mg/dL      Comment: : [id number] Elmer [employer]eter ID: [id number]       Osmolality, Serum [[id number]]  (Normal) Collected:  19 112    Specimen:  Blood Updated:  19 1220     Osmolality 292 mOsm/kg     Sodium [[id number]]  (Normal) Collected:  19 112    Specimen:  Blood Updated:  19 1150     Sodium 140 mmol/L     Potassium [[id number]]  (Normal) Collected:  19 1121    Specimen:  Blood Updated:  19 1150     Potassium 3.9 mmol/L     Potassium [[id number]]  (Normal) Collected:  19 0816    Specimen:  Blood Updated:  19 0833     Potassium 3.6 mmol/L     Osmolality, Serum [[id number]]  (Normal) Collected:  19 0615    Specimen:  Blood Updated:  19 0724     Osmolality 290 mOsm/kg         Imaging Results (last 24 hours)     ** No results found for the last 24 hours. **        Orders (last 24 hrs)     Start     Ordered    19 0641  POC Glucose Once  Once      19 0627    19 0600  Basic Metabolic Panel  Morning Draw      19 0824    19 0145  sodium chloride (HYPERTONIC) 3 % infusion  Continuous      19 0047    19 0046  dextrose (D5W) 5 % infusion 369 mL  Continuous PRN      19 0047    19 0005  POC Glucose Once  Once      19 2353    19 1711  POC Glucose Once  Once      19 1648    19 1553  PT Plan of Care Cert / Re-Cert  Once     Comments:  Physical Therapy Plan of Care  Initial Certification  Certification Period: 2019 - 2019    Patient Name: Michael Sorto  : 1955    (R41.89) Impaired cognition  (primary encounter diagnosis)  (Z74.09) Decreased functional mobility and endurance  (R68.89) Decreased activities of daily living (ADL)                  Assessment  PT Assessment  Functional Level at Time of Evaluation (PT Clinical Impression): assist w/ OOB  mobility  Impairments Found (describe specific impairments): aerobic capacity/endurance, gait, locomotion, and balance  PT Diagnosis (PT Clinical Impression): decreased mobility   Criteria for Skilled Interventions Met (PT Clinical Impression): yes, treatment indicated        Rehab Goal Summary     Row Name 08/08/19 0756 08/08/19 0718          Physical Therapy Goals    Bed Mobility Goal Selection (PT)  bed mobility, PT goal 1  -CHEO (r) SC (t)   CHEO (c)  -     Transfer Goal Selection (PT)  transfer, PT goal 1  -CHEO (r) SC (t) CHEO (c)    -     Gait Training Goal Selection (PT)  gait training, PT goal 1  -CHEO (r) SC   (t) CHEO (c)  -        Bed Mobility Goal 1 (PT)    Activity/Assistive Device (Bed Mobility Goal 1, PT)  bed mobility   activities, all  -CHEO (r) SC (t) CHEO (c)  -     [place]erald Level/Cues Needed (Bed Mobility Goal 1, PT)  conditional   independence  -CHEO (r) SC (t) CHEO (c)  -     Time Frame (Bed Mobility Goal 1, PT)  long term goal (LTG);10 days  -CHEO   (r) SC (t) CHEO (c)  -     Progress/Outcomes (Bed Mobility Goal 1, PT)  goal ongoing  -CHEO (r) SC (t)   CHEO (c)  -        Transfer Goal 1 (PT)    Activity/Assistive Device (Transfer Goal 1, PT)  transfers, all  -CHEO (r)   SC (t) CHEO (c)  -     [place] Level/Cues Needed (Transfer Goal 1, PT)  contact guard   assist  -CHEO (r) SC (t) CHEO (c)  -     Time Frame (Transfer Goal 1, PT)  long term goal (LTG);10 days  -CHEO (r)   SC (t) CHEO (c)  -     Progress/Outcome (Transfer Goal 1, PT)  goal ongoing  -CHEO (r) SC (t) CHEO   (c)  -        Gait Training Goal 1 (PT)    Activity/Assistive Device (Gait Training Goal 1, PT)  gait (walking   locomotion);assistive device use;forward stepping;decrease fall   risk;improve balance and speed;increase endurance/gait distance;increase   energy conservation;maintain weight bearing status  -CHEO (r) SC (t) CHEO (c)    -     [place]erald Level (Gait Training Goal 1, PT)  contact guard assist  -CHEO   (r) SC (t) CHEO (c)  -     Distance (Gait Goal 1, PT)   50  -CHEO (r) SC (t) CHEO (c)  -     Time Frame (Gait Training Goal 1, PT)  long term goal (LTG);10 days  -CHEO   (r) SC (t) CHEO (c)  -     Progress/Outcome (Gait Training Goal 1, PT)  goal ongoing  -CHEO (r) SC (t)   CHEO (c)  -        Occupational Therapy Goals    Transfer Goal Selection (OT)  -  transfer, OT goal 1  -JJ     Dressing Goal Selection (OT)  -  dressing, OT goal 1  -JJ     Strength Goal Selection (OT)  -  strength, OT goal 1  -JJ        Transfer Goal 1 (OT)    Activity/Assistive Device (Transfer Goal 1, OT)  -  transfers, all  -JJ     [place] Level/Cues Needed (Transfer Goal 1, OT)  -  standby assist    -JJ     Time Frame (Transfer Goal 1, OT)  -  long term goal (LTG);by discharge    -JJ     Progress/Outcome (Transfer Goal 1, OT)  -  goal ongoing  -JJ        Dressing Goal 1 (OT)    Activity/Assistive Device (Dressing Goal 1, OT)  -  dressing skills, all    -JJ     [place]/Cues Needed (Dressing Goal 1, OT)  -  supervision required    -JJ     Time Frame (Dressing Goal 1, OT)  -  long term goal (LTG);by discharge    -JJ     Progress/Outcome (Dressing Goal 1, OT)  -  goal ongoing  -JJ        Strength Goal 1 (OT)    Strength Goal 1 (OT)  -  Pt will increase BUE strength to 4+/5 for   increased independence with adls, t/fs, and mobility.   -JJ     Time Frame (Strength Goal 1, OT)  -  long term goal (LTG);by discharge    -JJ     Progress/Outcome (Strength Goal 1, OT)  -  goal ongoing  -JJ       User Key  (r) = Recorded By, (t) = Taken By, (c) = Cosigned By    Initials Name Provider Type Discipline    Alton Diaz, PT DPT Physical Therapist PT    Susan Kumar, OTR/L Occupational Therapist OT    Kayden Moreno, PT Student PT Student PT      PT OP Goals     Row Name 08/08/19 0928          Time Calculation    PT Goal Re-Cert Due Date  08/18/19  -CHEO (r) SC (t) CHEO (c)       User Key  (r) = Recorded By, (t) = Taken By, (c) = Cosigned By    Initials Name Provider Type    Alton Diaz,  PT DPT Physical Therapist    Kayden Moreno, PT Student PT Student            Plan  PT Plan  Therapy Frequency (PT Clinical Impression): daily  Planned Therapy Interventions (PT Eval): balance training, bed mobility training, gait training, strengthening, transfer training, patient/family education, home exercise program, postural re-education, prosthetic fitting/training  Outcome Summary: Pt. stood and transferred from chair to bed with wx and contact gaurd assist. Doffed his prosthesis independently.         Alton Rudolph, PT DPT  2019            By cosigning this order, either electronically or physically, I certify that the therapy services are furnished while this patient is under my care, the services outline above are required by this patient, and will be reviewed every 90 days.        M.D.:__________________________________________ Date: ______________    19 1552    19 1407  POC Glucose Once  Once      19 1355    19 1309  SLP Plan of Care Cert / Re-Cert  Once     Comments:  Speech Language Pathology Plan of Care  Initial Certification  Certification Period: 2019 - 2019    Patient Name: Michael Sorto  : 1955    (R41.89) Impaired cognition  (primary encounter diagnosis)  (Z74.09) Decreased functional mobility and endurance  (R68.89) Decreased activities of daily living (ADL)                Assessment  SLP Assessment  Plan of Care Reviewed With: patient            SLP OP Goals     Row Name 19 0928          Time Calculation    PT Goal Re-Cert Due Date  19  (Pended)   -SC       User Key  (r) = Recorded By, (t) = Taken By, (c) = Cosigned By    Initials Name Provider Type    Kayden Moreno, PT Student PT Student            Plan    SLP Plan  Therapy Frequency (SLP SLC): evaluation only        Antony Marquez, MS CCC-SLP  2019      By cosigning this order, either electronically or physically, I certify that the therapy services are  furnished while this patient is under my care, the services outline above are required by this patient, and will be reviewed every 90 days.        M.D.:__________________________________________ Date: ______________    19 1308    19 1208  OT Plan of Care Cert / Re-Cert  Once     Comments:  Occupational Therapy Plan of Care  Initial Certification  Certification Period: 2019 - 2019    Patient Name: Michael Sorto  : 1955    (R41.89) Impaired cognition  (primary encounter diagnosis)  (Z74.09) Decreased functional mobility and endurance  (R68.89) Decreased activities of daily living (ADL)                Assessment  OT Assessment  OT Diagnosis: decreased adls.   Rehab Potential (OT Eval): good, to achieve stated therapy goals  Criteria for Skilled Therapeutic Interventions Met (OT Eval): yes, treatment indicated        Rehab Goal Summary     Row Name 19 0756 19 0718          Physical Therapy Goals    Bed Mobility Goal Selection (PT)  bed mobility, PT goal 1  (Pended)   -SC    -     Transfer Goal Selection (PT)  transfer, PT goal 1  (Pended)   -SC  -     Gait Training Goal Selection (PT)  gait training, PT goal 1  (Pended)     -SC  -        Bed Mobility Goal 1 (PT)    Activity/Assistive Device (Bed Mobility Goal 1, PT)  bed mobility   activities, all  (Pended)   -SC  -     [place] Level/Cues Needed (Bed Mobility Goal 1, PT)  conditional   independence  (Pended)   -SC  -     Time Frame (Bed Mobility Goal 1, PT)  long term goal (LTG);10 days    (Pended)   -SC  -     Progress/Outcomes (Bed Mobility Goal 1, PT)  goal ongoing  (Pended)   -SC    -        Transfer Goal 1 (PT)    Activity/Assistive Device (Transfer Goal 1, PT)  transfers, all  (Pended)     -SC  -     [place] Level/Cues Needed (Transfer Goal 1, PT)  contact guard   assist  (Pended)   -SC  -     Time Frame (Transfer Goal 1, PT)  long term goal (LTG);10 days  (Pended)     -SC  -     Progress/Outcome (Transfer Goal 1,  PT)  goal ongoing  (Pended)   -SC  -        Gait Training Goal 1 (PT)    Activity/Assistive Device (Gait Training Goal 1, PT)  gait (walking   locomotion);assistive device use;forward stepping;decrease fall   risk;improve balance and speed;increase endurance/gait distance;increase   energy conservation;maintain weight bearing status  (Pended)   -SC  -     [place]land Level (Gait Training Goal 1, PT)  contact guard assist    (Pended)   -SC  -     Distance (Gait Goal 1, PT)  50  (Pended)   -SC  -     Time Frame (Gait Training Goal 1, PT)  long term goal (LTG);10 days    (Pended)   -SC  -     Progress/Outcome (Gait Training Goal 1, PT)  goal ongoing  (Pended)   -SC    -        Occupational Therapy Goals    Transfer Goal Selection (OT)  -  transfer, OT goal 1  -JJ     Dressing Goal Selection (OT)  -  dressing, OT goal 1  -JJ     Strength Goal Selection (OT)  -  strength, OT goal 1  -JJ        Transfer Goal 1 (OT)    Activity/Assistive Device (Transfer Goal 1, OT)  -  transfers, all  -JJ     [place] Level/Cues Needed (Transfer Goal 1, OT)  -  standby assist    -JJ     Time Frame (Transfer Goal 1, OT)  -  long term goal (LTG);by discharge    -JJ     Progress/Outcome (Transfer Goal 1, OT)  -  goal ongoing  -JJ        Dressing Goal 1 (OT)    Activity/Assistive Device (Dressing Goal 1, OT)  -  dressing skills, all    -JJ     [place]/Cues Needed (Dressing Goal 1, OT)  -  supervision required    -JJ     Time Frame (Dressing Goal 1, OT)  -  long term goal (LTG);by discharge    -JJ     Progress/Outcome (Dressing Goal 1, OT)  -  goal ongoing  -JJ        Strength Goal 1 (OT)    Strength Goal 1 (OT)  -  Pt will increase BUE strength to 4+/5 for   increased independence with adls, t/fs, and mobility.   -JJ     Time Frame (Strength Goal 1, OT)  -  long term goal (LTG);by discharge    -JJ     Progress/Outcome (Strength Goal 1, OT)  -  goal ongoing  -JJ       User Key  (r) = Recorded By, (t) = Taken By, (c) = Cosigned By     Initials Name Provider Type Discipline    Susan Kumar OTR/L Occupational Therapist OT    SC Kayden Dunn, PT Student PT Student PT        OT Goals     Row Name 08/08/19 1200          Time Calculation    OT Goal Re-Cert Due Date  08/18/19  -ARCHIE       User Key  (r) = Recorded By, (t) = Taken By, (c) = Cosigned By    Initials Name Provider Type    Susan Kumar OTR/L Occupational Therapist          Plan    OT Plan  Therapy Frequency (OT Eval): 5 times/wk  Predicted Duration of Therapy Intervention (Therapy Eval): until d/c from facility  Outcome Summary: OT eval completed. Pt is A&Ox4. Pt required supervision/CGA for supine to sit at EOB. Min A for donning knee immobilizer and Independently able to don LLE prothesis. Pt Min/Mod Ax2 for sit <> stand t/f from EOB with rwx. Required verbal/tactile cues to maintain NWB LLE. Able to take steps from bed to chair with Min/Mod Ax2 and rwx. Pt would benefit from skilled OT services to address decreased strength, balance, activity tolerance, SOB and increased pain. Recommend d/c to SNF for continued rehab.       RONN Schultz/LIAM  8/8/2019        By cosigning this order, either electronically or physically, I certify that the therapy services are furnished while this patient is under my care, the services outline above are required by this patient, and will be reviewed every 90 days.        M.D.:__________________________________________ Date: ______________    08/08/19 1207    08/08/19 1045  amLODIPine (NORVASC) tablet 5 mg  Every 24 Hours Scheduled      08/08/19 0947    08/08/19 0800  Neuro Checks  Every 2 Hours      08/08/19 1441    08/07/19 2200  amoxicillin (AMOXIL) capsule 500 mg  Every 8 Hours Scheduled      08/07/19 1750    08/07/19 2100  busPIRone (BUSPAR) tablet 5 mg  Every 12 Hours Scheduled      08/07/19 1544    08/07/19 2100  carvedilol (COREG) tablet 3.125 mg  Every 12 Hours Scheduled,   Status:  Discontinued      08/07/19 1544     08/07/19 2100  sodium chloride 0.9 % flush 3 mL  Every 12 Hours Scheduled      08/07/19 1544    08/07/19 2100  levETIRAcetam (KEPPRA) tablet 500 mg  2 Times Daily      08/07/19 1740    08/07/19 1800  Sodium  Every 6 Hours      08/07/19 1544    08/07/19 1800  Osmolality, Serum  Every 6 Hours      08/07/19 1544    08/07/19 1800  POC Glucose Finger Q6H  Every 6 Hours      08/07/19 1544    08/07/19 1630  sodium chloride (HYPERTONIC) 3 % infusion  Continuous      08/07/19 1544    08/07/19 1545  Potassium  Every 4 Hours      08/07/19 1544    08/07/19 1544  oxyCODONE-acetaminophen (PERCOCET) 5-325 MG per tablet 1 tablet  Every 6 Hours PRN      08/07/19 1544    08/07/19 1540  dextrose (D5W) 5 % infusion 369 mL  Continuous PRN      08/07/19 1544    08/07/19 1536  sodium chloride 0.9 % flush 3-10 mL  As Needed      08/07/19 1544    08/07/19 1536  acetaminophen (TYLENOL) tablet 650 mg  Every 4 Hours PRN      08/07/19 1544    08/07/19 1536  acetaminophen (TYLENOL) suppository 650 mg  Every 4 Hours PRN      08/07/19 1544    08/07/19 1536  docusate sodium (COLACE) capsule 100 mg  2 Times Daily PRN      08/07/19 1544    08/07/19 1536  ondansetron (ZOFRAN) tablet 4 mg  Every 6 Hours PRN      08/07/19 1544    08/07/19 1536  ondansetron (ZOFRAN) injection 4 mg  Every 6 Hours PRN      08/07/19 1544    08/07/19 1536  oxyCODONE (ROXICODONE) immediate release tablet 5 mg  Every 4 Hours PRN      08/07/19 1544    08/07/19 1536  cyclobenzaprine (FLEXERIL) tablet 10 mg  3 Times Daily PRN      08/07/19 1544    08/07/19 1530  labetalol (NORMODYNE,TRANDATE) injection 10 mg  Once      08/07/19 1437    08/07/19 1437  hydrALAZINE (APRESOLINE) injection 5 mg  Every 15 Minutes PRN      08/07/19 1437    08/07/19 1400  niCARdipine (CARDENE) 25 mg/250 mL (0.1 mg/mL) 0.9% NS infusion  Titrated      08/07/19 1309    --  citalopram (CeleXA) 10 MG tablet  Daily      08/08/19 1555    --  SCANNED - TELEMETRY        08/07/19 0000             Physician  Progress Notes (last 24 hours) (Notes from 8/8/2019  6:49 AM through 8/9/2019  6:49 AM)      Bi Clemons DO at 8/8/2019  8:53 AM              [facility] Medicine Services  INPATIENT PROGRESS NOTE    Length of Stay: 1  Date of Admission: 8/7/2019  Primary Care Physician: Rossy Arias APRN    Subjective     Chief Complaint:     Mild headache    HPI     Patient headaches have decreased in intensity significantly.  He has had no nausea no vomiting.  He is tolerating oral antibiotics without difficulty.  The demarcated area of erythema on the right lower extremity indicates regression of the erythema.  Regression of erythema may simply be secondary to increased mobilization since admission as the patient is currently working with physical therapy.  Regardless, the patient will continue on empiric oral antibiotic therapy.    Patient has been bradycardic since reinitiating Coreg.  The patient states that this was a recently started medication that it caused him to be tired.  I believe it would be appropriate to discontinue Coreg in favor of amlodipine 5 mg daily.    Review of Systems     All pertinent negatives and positives are as above. All other systems have been reviewed and are negative unless otherwise stated.     Objective    Temp:  [97.9 °F (36.6 °C)-98.7 °F (37.1 °C)] 97.9 °F (36.6 °C)  Heart Rate:  [55-83] 64  Resp:  [13-18] 16  BP: ()/() 115/87    Lab Results (last 24 hours)     Procedure Component Value Units Date/Time    Potassium [[id number]]  (Normal) Collected:  08/08/19 0816    Specimen:  Blood Updated:  08/08/19 0833     Potassium 3.6 mmol/L     Osmolality, Serum [[id number]]  (Normal) Collected:  08/08/19 0615    Specimen:  Blood Updated:  08/08/19 0724     Osmolality 290 mOsm/kg     Sodium [[id number]]  (Normal) Collected:  08/08/19 0615    Specimen:  Blood Updated:  08/08/19 0642     Sodium 140 mmol/L     POC Glucose Once [[id number]]  (Normal)  Collected:  08/08/19 0525    Specimen:  Blood Updated:  08/08/19 0537     Glucose 95 mg/dL      Comment: : [id number] [employer] ID: [id number]       Osmolality, Serum [[id number]]  (Abnormal) Collected:  08/08/19 0317    Specimen:  Blood Updated:  08/08/19 0418     Osmolality 287 mOsm/kg     Potassium [[id number]]  (Normal) Collected:  08/08/19 0317    Specimen:  Blood Updated:  08/08/19 0347     Potassium 3.8 mmol/L     Sodium [[id number]]  (Normal) Collected:  08/08/19 0317    Specimen:  Blood Updated:  08/08/19 0347     Sodium 141 mmol/L     Osmolality, Serum [[id number]]  (Abnormal) Collected:  08/08/19 0016    Specimen:  Blood Updated:  08/08/19 0223     Osmolality 285 mOsm/kg     Potassium [[id number]]  (Normal) Collected:  08/08/19 0016    Specimen:  Blood Updated:  08/08/19 0046     Potassium 4.2 mmol/L      Comment: Specimen hemolyzed.  Results may be affected.       Sodium [[id number]]  (Normal) Collected:  08/08/19 0016    Specimen:  Blood Updated:  08/08/19 0044     Sodium 139 mmol/L     POC Glucose Once [[id number]]  (Normal) Collected:  08/07/19 2339    Specimen:  Blood Updated:  08/08/19 0000     Glucose 106 mg/dL      Comment: : [id number] [employer] ID: [id number]       Osmolality, Serum [[id number]]  (Normal) Collected:  08/07/19 1629    Specimen:  Blood Updated:  08/07/19 1716     Osmolality 289 mOsm/kg     aPTT [[id number]]  (Normal) Collected:  08/07/19 1629    Specimen:  Blood Updated:  08/07/19 1653     PTT 31.6 seconds     Protime-INR [[id number]]  (Normal) Collected:  08/07/19 1629    Specimen:  Blood Updated:  08/07/19 1653     Protime 14.3 Seconds      INR 1.08    Comprehensive Metabolic Panel [[id number]]  (Abnormal) Collected:  08/07/19 1629    Specimen:  Blood Updated:  08/07/19 1652     Glucose 112 mg/dL      BUN 13 mg/dL      Creatinine 0.79 mg/dL      Sodium 139 mmol/L      Potassium 4.0 mmol/L      Chloride 103 mmol/L      CO2 29.0 mmol/L      Calcium 9.4 mg/dL       Total Protein 7.3 g/dL      Albumin 4.40 g/dL      ALT (SGPT) 23 U/L      AST (SGOT) 21 U/L      Alkaline Phosphatase 160 U/L      Total Bilirubin 1.3 mg/dL      eGFR Non African Amer 99 mL/min/1.73      Globulin 2.9 gm/dL      A/G Ratio 1.5 g/dL      BUN/Creatinine Ratio 16.5     Anion Gap 7.0 mmol/L     Narrative:       GFR Normal >60  Chronic Kidney Disease <60  Kidney Failure <15    Potassium [[id number]]  (Normal) Collected:  08/07/19 1629    Specimen:  Blood Updated:  08/07/19 1652     Potassium 4.0 mmol/L     CBC Auto Differential [[id number]]  (Abnormal) Collected:  08/07/19 1629    Specimen:  Blood Updated:  08/07/19 1649     WBC 6.48 10*3/mm3      RBC 4.56 10*6/mm3      Hemoglobin 13.5 g/dL      Hematocrit 41.0 %      MCV 89.9 fL      MCH 29.6 pg      MCHC 32.9 g/dL      RDW 15.0 %      RDW-SD 49.2 fl      MPV 10.0 fL      Platelets 131 10*3/mm3      Neutrophil % 83.3 %      Lymphocyte % 10.0 %      Monocyte % 5.9 %      Eosinophil % 0.3 %      Basophil % 0.2 %      Neutrophils, Absolute 5.40 10*3/mm3      Lymphocytes, Absolute 0.65 10*3/mm3      Monocytes, Absolute 0.38 10*3/mm3      Eosinophils, Absolute 0.02 10*3/mm3      Basophils, Absolute 0.01 10*3/mm3           Imaging Results (last 24 hours)     Procedure Component Value Units Date/Time    CT Angiogram Head With & Without Contrast [[id number]] Collected:  08/07/19 2235     Updated:  08/07/19 2247    Narrative:       EXAMINATION: CT angiogram of the head with contrast 08/07/2019     DOSE: 1052 mGycm. Automated exposure control was utilized to diminish  patient radiation dose..     HISTORY: Intracranial hemorrhage     FINDINGS: Multiple contiguous axial images are obtained through the  [place] of [place] at 1 mm intervals following intravenous contrast  administration with reformatted images obtained in the sagittal and  coronal projections from the original dataset as well as MIPS.     Within the left occipital lobe there is a intraparenchymal  hemorrhage  measuring approximate 3.2 x 2.9 cm in size. There is also some subdural  hemorrhage layering along the left tentorium cerebelli and extending  into the posterior interhemispheric fissure     The distal vertebral arteries are widely patent. The basilar artery is  normal in caliber. The superior cerebellar and posterior cerebral  arteries are also normal in caliber without evidence of focal  steno-occlusive disease or discrete berry aneurysm.     There is mild calcific plaquing involving the cavernous and supraclinoid  aspect of both ICAs without evidence of high-grade stenosis. The  anterior and middle cerebral arteries are normal in caliber without  evidence of discrete intraluminal thrombus, focal steno-occlusive  disease or discrete berry aneurysm. No discrete vascular malformations  are demonstrated. No evidence of dural venous sinus thrombosis. There is  some asymmetry within the left transverse sinus which could be in part  related to some mass effect related to the rind of edema surrounding the  intraparenchymal hemorrhage. There is also hypodensity within the right  posterior parietal and occipital lobe which has the appearance of a  subacute infarct.       Impression:       1.. Essentially normal posterior circulation without evidence of focal  steno-occlusive disease or discrete berry aneurysm.  2. Mild calcific plaquing involving the cavernous and supraclinoid  aspect of both ICAs with the anterior circulation otherwise  unremarkable.  3. Left occipital intraparenchymal hematoma with surrounding vasogenic  edema. There is also a subdural component which is parafalcine and left  tentorial in location measuring less than a centimeter in thickness.  This report was finalized on 08/07/2019 22:44 by Dr. Caleb Moreno MD.    CT Head Without Contrast [[id number]] Collected:  08/07/19 1441     Updated:  08/07/19 1455    Narrative:       CT HEAD WO CONTRAST- 8/7/2019 1:48 PM CDT     HISTORY:  Intracranial hemorrhage       DOSE LENGTH PRODUCT: 542 mGy cm. Automated exposure control was also  utilized to decrease patient radiation dose.     Technique:   Axial CT of the brain without IV contrast. Sagittal and coronal  reformations are also provided for review. Soft tissue and bone kernels  are available for interpretation.     Comparison: None.     Findings:      Approximately 3.6 x 4.3 x 2.5 cm left occipital intraparenchymal  hematoma (series 7 image image 18 and series 3-image 17). There is a  collar of surrounding mild to moderate vasogenic edema. There is an area  of low-attenuation in the right frontoparietal region which appears to  reflect an area of subacute to chronic ischemia. Acute parafalcine, left  tentorial and left convexity subdural hemorrhage is also identified. The  left cerebral convexity components of the subdural hemorrhage measures  up to 0.6 cm in greatest thickness. Mass effect from this hemorrhage  effaces the left lateral ventricle and there is an approximately 4 mm  left-to-right midline shift. The third ventricle is also effaced. No  trapping of the right lateral ventricle. The basal cisterns are  symmetric. No subarachnoid hemorrhage identified. No calvarial fracture.     Small retention cyst in the left sphenoid sinus. The paranasal sinuses  are otherwise clear. The mastoids are clear.       Impression:       Impression:    1. Left occipital intraparenchymal hematoma measuring up to 4.3 cm.  2. Acute left cerebral convexity, parafalcine and left tentorial  subdural hemorrhage. This measures up to 0.6 cm in thickness along the  left frontal convexity.  3. Associated 4 mm left-to-right midline shift. No trapping of the right  lateral ventricle.  This report was finalized on 08/07/2019 14:52 by Dr Clayton Samayoa, .             Intake/Output Summary (Last 24 hours) at 8/8/2019 0853  Last data filed at 8/8/2019 0542  Gross per 24 hour   Intake 1350.3 ml   Output 2100 ml   Net -749.7  ml       Physical Exam    Constitutional: He is oriented to person, place, and time. He appears well-developed and well-nourished. He is cooperative.   He is morbidly obese.  HENT:   Head: Normocephalic and atraumatic.   Nose: Nose normal.   Mouth/Throat: Oropharynx is clear and moist.   Eyes: Conjunctivae are normal. . No scleral icterus.   Neck: Normal range of motion. Neck supple. No JVD present.   Cardiovascular: Normal rate, regular rhythm and intact distal pulses.   Murmur (on right) heard.   Systolic murmur is present with a grade of 4/6.  Pulmonary/Chest: Effort normal and breath sounds normal. No respiratory distress.   Abdominal: Soft. Bowel sounds are normal. He exhibits no mass. There is no tenderness.   Musculoskeletal: Normal range of motion. He exhibits no edema or tenderness.   Left BKA.    Neurological: He is alert and oriented to person, place, and time. Coordination normal.   Skin: Skin is warm and dry. There is erythema (mild area below right knee).        There is a well healed vertical scar over the right knee.   Psychiatric: He has a normal mood and affect.          Results Review:  I have reviewed the labs, radiology results, and diagnostic studies since my last progress note and made treatment changes reflective of the results.   I have reviewed the current medications.    Assessment/Plan     Active Hospital Problems    Diagnosis   • **Nontraumatic intracerebral hemorrhage (CMS/HCC)   • Headache   • Nausea   • Hypertension   • Morbid obesity (CMS/HCC)   • Cellulitis of left lower extremity   • Intracerebral hemorrhage (CMS/HCC)       PLAN:  Continue amoxicillin  Management of ICH per Neurosurgery  Discontinue Coreg  Amlodipine 5 mg p.o. daily    Bi Clemons DO   08/08/19   8:53 AM      Electronically signed by Bi Clemons DO at 8/8/2019  9:48 AM     Sabino De León APRN at 8/8/2019  8:13 AM          Neurosurgery Daily Progress Note    Assessment:   Michael Sorto is a 64  y.o. 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  He presents with a new problem of worsening headaches and nausea.  Physical exam findings of alert and oriented, no pronator drift or dysmetria, follow simple commands, GCS 15; edema, erythema, and warmth to right lower extremity.  Their imaging shows an acute parafalcine, left tentorial and left convexity subdural hemorrhage, left occipital intraparenchymal hemorrhage with surrounding edema, left subdural hemorrhage with a 4 mm left to right midline shift, and an area in the right frontal parietal region which appears to reflect chronic ischemia.    DDX:     Nontraumatic intracerebral hemorrhage (CMS/HCC)    Headache    Nausea    Hypertension    Morbid obesity (CMS/HCC)    Cellulitis of left lower extremity    Intracerebral hemorrhage (CMS/HCC)    Patient Active Problem List   Diagnosis   • Headache   • Nausea   • Nontraumatic intracerebral hemorrhage (CMS/HCC)   • Hypertension   • Morbid obesity (CMS/HCC)   • Cellulitis of left lower extremity   • Intracerebral hemorrhage (CMS/HCC)     Plan:   Neuro: Stable.  Neuro intact/unchanged   CTA head stable   Neurochecks every hour   Prophylactic Keppra   Hypertonic 3% saline: 25 ML/HR   Na goal 145-150, currently 141    Osmo < 320, currently to 87     CV: Titrate Cardene to keep SBP less than 140; PRN labetalol    SBP   Pulm: Maintaining O2 sat.  Continuous pulse oximetry.  Incentive spirometry  : Voiding spontaneously  FEN: Tolerating oral diet.  GI: DAVID.  ID: Amoxicillin for treatment of RLE cellulitis   Appreciate hospitalist  Heme:  DVT prophylaxis held for brain bleed; SCDs; BMP today  Pain: Tolerable at present  Dispo: PT/OT    Chief complaint:   Worsening headache and nausea    Subjective  Symptoms stable.  Doing  "well    Temp:  [97.9 °F (36.6 °C)-98.7 °F (37.1 °C)] 97.9 °F (36.6 °C)  Heart Rate:  [55-83] 64  Resp:  [13-18] 16  BP: ()/() 115/87    Objective:  General Appearance:  Comfortable, well-appearing, in no acute distress and not in pain.    Vital signs: (most recent): Blood pressure 115/87, pulse 64, temperature 97.9 °F (36.6 °C), temperature source Oral, resp. rate 16, height 165.1 cm (65\"), weight (!) 138 kg (303 lb 6.4 oz), SpO2 100 %.      Neurologic Exam     Mental Status   Oriented to person, place, and time.   Attention: normal. Concentration: normal.   Speech: speech is normal   Level of consciousness: alert  Follow simple commands     Cranial Nerves     CN II   Visual fields full to confrontation.     CN III, IV, VI   Pupils are equal, round, and reactive to light.  Extraocular motions are normal.     CN V   Facial sensation intact.     CN VII   Facial expression full, symmetric.     CN VIII   CN VIII normal.     CN IX, X   CN IX normal.     CN XI   CN XI normal.     Motor Exam   Muscle bulk: normal  Overall muscle tone: normal  Right arm tone: normal  Left arm tone: normal  Right arm pronator drift: absent  Left arm pronator drift: absent  Right leg tone: normal  Left leg tone: normal    Strength   Right deltoid: 5/5  Left deltoid: 5/5  Right biceps: 5/5  Left biceps: 5/5  Right triceps: 5/5  Left triceps: 5/5  Right wrist extension: 5/5  Left wrist extension: 5/5  Right iliopsoas: 5/5  Left iliopsoas: 4/5  Right quadriceps: 5/5  Right anterior tibial: 5/5  Right posterior tibial: 5/5  No dysmetria noted     Sensory Exam   Left leg light touch: decreased from knee    Gait, Coordination, and Reflexes     Tremor   Resting tremor: absent  Intention tremor: absent  Action tremor: absent    Reflexes   Right plantar: normal  Right Mcbride: absent  Left Mcbride: absent  Right ankle clonus: absent  Right pendular knee jerk: absent  Left pendular knee jerk: absent    Drains: NA    Imaging Results (last 24 " hours)     Procedure Component Value Units Date/Time    CT Angiogram Head With & Without Contrast [[id number]] Collected:  08/07/19 2235     Updated:  08/07/19 2247    Narrative:       EXAMINATION: CT angiogram of the head with contrast 08/07/2019     DOSE: 1052 mGycm. Automated exposure control was utilized to diminish  patient radiation dose..     HISTORY: Intracranial hemorrhage     FINDINGS: Multiple contiguous axial images are obtained through the  [place] [facility] [place] at 1 mm intervals following intravenous contrast  administration with reformatted images obtained in the sagittal and  coronal projections from the original dataset as well as MIPS.     Within the left occipital lobe there is a intraparenchymal hemorrhage  measuring approximate 3.2 x 2.9 cm in size. There is also some subdural  hemorrhage layering along the left tentorium cerebelli and extending  into the posterior interhemispheric fissure     The distal vertebral arteries are widely patent. The basilar artery is  normal in caliber. The superior cerebellar and posterior cerebral  arteries are also normal in caliber without evidence of focal  steno-occlusive disease or discrete berry aneurysm.     There is mild calcific plaquing involving the cavernous and supraclinoid  aspect of both ICAs without evidence of high-grade stenosis. The  anterior and middle cerebral arteries are normal in caliber without  evidence of discrete intraluminal thrombus, focal steno-occlusive  disease or discrete berry aneurysm. No discrete vascular malformations  are demonstrated. No evidence of dural venous sinus thrombosis. There is  some asymmetry within the left transverse sinus which could be in part  related to some mass effect related to the rind of edema surrounding the  intraparenchymal hemorrhage. There is also hypodensity within the right  posterior parietal and occipital lobe which has the appearance of a  subacute infarct.       Impression:       1.. Essentially  normal posterior circulation without evidence of focal  steno-occlusive disease or discrete berry aneurysm.  2. Mild calcific plaquing involving the cavernous and supraclinoid  aspect of both ICAs with the anterior circulation otherwise  unremarkable.  3. Left occipital intraparenchymal hematoma with surrounding vasogenic  edema. There is also a subdural component which is parafalcine and left  tentorial in location measuring less than a centimeter in thickness.  This report was finalized on 08/07/2019 22:44 by Dr. Caleb Moreno MD.    CT Head Without Contrast [[id number]] Collected:  08/07/19 1441     Updated:  08/07/19 1455    Narrative:       CT HEAD WO CONTRAST- 8/7/2019 1:48 PM CDT     HISTORY: Intracranial hemorrhage       DOSE LENGTH PRODUCT: 542 mGy cm. Automated exposure control was also  utilized to decrease patient radiation dose.     Technique:   Axial CT of the brain without IV contrast. Sagittal and coronal  reformations are also provided for review. Soft tissue and bone kernels  are available for interpretation.     Comparison: None.     Findings:      Approximately 3.6 x 4.3 x 2.5 cm left occipital intraparenchymal  hematoma (series 7 image image 18 and series 3-image 17). There is a  collar of surrounding mild to moderate vasogenic edema. There is an area  of low-attenuation in the right frontoparietal region which appears to  reflect an area of subacute to chronic ischemia. Acute parafalcine, left  tentorial and left convexity subdural hemorrhage is also identified. The  left cerebral convexity components of the subdural hemorrhage measures  up to 0.6 cm in greatest thickness. Mass effect from this hemorrhage  effaces the left lateral ventricle and there is an approximately 4 mm  left-to-right midline shift. The third ventricle is also effaced. No  trapping of the right lateral ventricle. The basal cisterns are  symmetric. No subarachnoid hemorrhage identified. No calvarial fracture.     Small  retention cyst in the left sphenoid sinus. The paranasal sinuses  are otherwise clear. The mastoids are clear.       Impression:       Impression:    1. Left occipital intraparenchymal hematoma measuring up to 4.3 cm.  2. Acute left cerebral convexity, parafalcine and left tentorial  subdural hemorrhage. This measures up to 0.6 cm in thickness along the  left frontal convexity.  3. Associated 4 mm left-to-right midline shift. No trapping of the right  lateral ventricle.  This report was finalized on 08/07/2019 14:52 by Dr Clayton Samayoa, .        Lab Results (last 24 hours)     Procedure Component Value Units Date/Time    Osmolality, Serum [[id number]]  (Normal) Collected:  08/08/19 0615    Specimen:  Blood Updated:  08/08/19 0724     Osmolality 290 mOsm/kg     Sodium [[id number]]  (Normal) Collected:  08/08/19 0615    Specimen:  Blood Updated:  08/08/19 0642     Sodium 140 mmol/L     POC Glucose Once [[id number]]  (Normal) Collected:  08/08/19 0525    Specimen:  Blood Updated:  08/08/19 0537     Glucose 95 mg/dL      Comment: : [id number] Samir  [facility] ID: [id number]       Osmolality, Serum [[id number]]  (Abnormal) Collected:  08/08/19 0317    Specimen:  Blood Updated:  08/08/19 0418     Osmolality 287 mOsm/kg     Potassium [[id number]]  (Normal) Collected:  08/08/19 0317    Specimen:  Blood Updated:  08/08/19 0347     Potassium 3.8 mmol/L     Sodium [[id number]]  (Normal) Collected:  08/08/19 0317    Specimen:  Blood Updated:  08/08/19 0347     Sodium 141 mmol/L     Osmolality, Serum [[id number]]  (Abnormal) Collected:  08/08/19 0016    Specimen:  Blood Updated:  08/08/19 0223     Osmolality 285 mOsm/kg     Potassium [[id number]]  (Normal) Collected:  08/08/19 0016    Specimen:  Blood Updated:  08/08/19 0046     Potassium 4.2 mmol/L      Comment: Specimen hemolyzed.  Results may be affected.       Sodium [[id number]]  (Normal) Collected:  08/08/19 0016    Specimen:  Blood Updated:  08/08/19 0044     Sodium 139  mmol/L     POC Glucose Once [[id number]]  (Normal) Collected:  08/07/19 2339    Specimen:  Blood Updated:  08/08/19 0000     Glucose 106 mg/dL      Comment: : [id number] Samir Miller ID: [id number]       Osmolality, Serum [[id number]]  (Normal) Collected:  08/07/19 1629    Specimen:  Blood Updated:  08/07/19 1716     Osmolality 289 mOsm/kg     aPTT [[id number]]  (Normal) Collected:  08/07/19 1629    Specimen:  Blood Updated:  08/07/19 1653     PTT 31.6 seconds     Protime-INR [[id number]]  (Normal) Collected:  08/07/19 1629    Specimen:  Blood Updated:  08/07/19 1653     Protime 14.3 Seconds      INR 1.08    Comprehensive Metabolic Panel [[id number]]  (Abnormal) Collected:  08/07/19 1629    Specimen:  Blood Updated:  08/07/19 1652     Glucose 112 mg/dL      BUN 13 mg/dL      Creatinine 0.79 mg/dL      Sodium 139 mmol/L      Potassium 4.0 mmol/L      Chloride 103 mmol/L      CO2 29.0 mmol/L      Calcium 9.4 mg/dL      Total Protein 7.3 g/dL      Albumin 4.40 g/dL      ALT (SGPT) 23 U/L      AST (SGOT) 21 U/L      Alkaline Phosphatase 160 U/L      Total Bilirubin 1.3 mg/dL      eGFR Non African Amer 99 mL/min/1.73      Globulin 2.9 gm/dL      A/G Ratio 1.5 g/dL      BUN/Creatinine Ratio 16.5     Anion Gap 7.0 mmol/L     Narrative:       GFR Normal >60  Chronic Kidney Disease <60  Kidney Failure <15    Potassium [[id number]]  (Normal) Collected:  08/07/19 1629    Specimen:  Blood Updated:  08/07/19 1652     Potassium 4.0 mmol/L     CBC Auto Differential [[id number]]  (Abnormal) Collected:  08/07/19 1629    Specimen:  Blood Updated:  08/07/19 1649     WBC 6.48 10*3/mm3      RBC 4.56 10*6/mm3      Hemoglobin 13.5 g/dL      Hematocrit 41.0 %      MCV 89.9 fL      MCH 29.6 pg      MCHC 32.9 g/dL      RDW 15.0 %      RDW-SD 49.2 fl      MPV 10.0 fL      Platelets 131 10*3/mm3      Neutrophil % 83.3 %      Lymphocyte % 10.0 %      Monocyte % 5.9 %      Eosinophil % 0.3 %      Basophil % 0.2 %      Neutrophils, Absolute  5.40 10*3/mm3      Lymphocytes, Absolute 0.65 10*3/mm3      Monocytes, Absolute 0.38 10*3/mm3      Eosinophils, Absolute 0.02 10*3/mm3      Basophils, Absolute 0.01 10*3/mm3         [id number]  BRAN [place]        Electronically signed by Sabino De León APRN at 8/8/2019  8:25 AM       Consult Notes (last 24 hours) (Notes from 8/8/2019  6:49 AM through 8/9/2019  6:49 AM)     No notes of this type exist for this encounter.        Antonia Rosado, PTA   Physical Therapy Assistant   Physical Therapy   Plan of Care   Signed   Date of Service:  8/8/2019  3:04 PM   Creation Time:  8/8/2019  3:04 PM            Signed           Problem: Patient Care Overview  Goal: Plan of Care Review  Outcome: Ongoing (interventions implemented as appropriate)    08/08/19 1502   Coping/Psychosocial   Plan of Care Reviewed With patient   OTHER   Outcome Summary Pt. stood and transferred from chair to bed with wx and contact gaurd assist. Doffed his prosthesis independently.                         Alton Rudolph, PT DPT   Physical Therapist   Physical Therapy   Plan of Care   Signed   Date of Service:  8/8/2019  3:51 PM   Creation Time:  8/8/2019  3:51 PM            Signed           Problem: Patient Care Overview  Goal: Plan of Care Review  Outcome: Ongoing (interventions implemented as appropriate)    08/08/19 0756   Coping/Psychosocial   Plan of Care Reviewed With patient   OTHER   Outcome Summary Pt eval complete. Pt A&Ox4. BKA LLE w/ prosthesis NWB RLE. Pt was able to navigate bed and sit EOB from supine w/ supervision/CGAx1. Sit<>stand transfer from sitting EOB to bedside chair min/modAx2. Pt showed decreased LLE strength in standing and weakened arm strength pushing through RWX. Pt will benefit from skilled PT to help improve strength and endurance for better mobility/ambulation post acute care stay. Recommend home w/ assist upon d/c from facility pending pt status/performance                         Susan Snyder OTR/L    Occupational Therapist   Occupational Therapy   Plan of Care   Signed   Date of Service:  8/8/2019 12:06 PM   Creation Time:  8/8/2019 12:06 PM            Signed           Problem: Patient Care Overview  Goal: Plan of Care Review  Outcome: Ongoing (interventions implemented as appropriate)    08/08/19 1203   Coping/Psychosocial   Plan of Care Reviewed With patient   Plan of Care Review   Progress improving   OTHER   Outcome Summary OT eval completed. Pt is A&Ox4. Pt required supervision/CGA for supine to sit at EOB. Min A for donning knee immobilizer and Independently able to don LLE prothesis. Pt Min/Mod Ax2 for sit <> stand t/f from EOB with rwx. Required verbal/tactile cues to maintain NWB LLE. Able to take steps from bed to chair with Min/Mod Ax2 and rwx. Pt would benefit from skilled OT services to address decreased strength, balance, activity tolerance, SOB and increased pain. Recommend d/c to SNF for continued rehab.

## 2019-08-09 NOTE — PROGRESS NOTES
Continued Stay Note   [place]     Patient Name: Michael Sorto  MRN: [id number]  Today's Date: 8/9/2019    Admit Date: 8/7/2019    Discharge Plan     Row Name 08/09/19 0936       Plan    Plan  Home    Patient/Family in Agreement with Plan  yes    Plan Comments  Patient plans to return home at discharge.  Patient refuses rehab placement and  services.        Discharge Codes    No documentation.             MARISELA Stringer

## 2019-08-09 NOTE — PROGRESS NOTES
Neurosurgery Daily Progress Note    Assessment:   Michael Sorto is a 64 y.o. 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  He presents with a new problem of worsening headaches and nausea.  Physical exam findings of alert and oriented, no pronator drift or dysmetria, follow simple commands, GCS 15; edema, erythema, and warmth to right lower extremity.  Their imaging shows an acute parafalcine, left tentorial and left convexity subdural hemorrhage, left occipital intraparenchymal hemorrhage with surrounding edema, left subdural hemorrhage with a 4 mm left to right midline shift, and an area in the right frontal parietal region which appears to reflect chronic ischemia.    DDX:     Nontraumatic intracerebral hemorrhage (CMS/HCC)    Headache    Nausea    Hypertension    Morbid obesity (CMS/HCC)    Cellulitis of left lower extremity    Intracerebral hemorrhage (CMS/HCC)    Patient Active Problem List   Diagnosis   • Headache   • Nausea   • Nontraumatic intracerebral hemorrhage (CMS/HCC)   • Hypertension   • Morbid obesity (CMS/HCC)   • Cellulitis of left lower extremity   • Intracerebral hemorrhage (CMS/HCC)     Plan:   Neuro: Stable.  Neuro intact/unchanged   CTA head stable   Repeat CT head today   Neurochecks every 2 hours   Prophylactic Keppra   DC hypertonic solution today      CV: SBP ,Titrate nicardipine to keep SBP less than 140   Transition to oral meds     Pulm: Maintaining O2 sat.  Continuous pulse oximetry.  Incentive spirometry  : Voiding spontaneously  FEN: Tolerating oral diet.  GI: DAVID.  ID: Amoxicillin for treatment of RLE cellulitis   Appreciate hospitalist  Heme:  DVT prophylaxis held for brain bleed; SCDs  Pain: Tolerable at present  Dispo: PT/OT; pending transition to oral antihypertensives and DC  "nicardipine    Chief complaint:   Worsening headache and nausea    Subjective  Symptoms stable.  Doing well    Temp:  [97.6 °F (36.4 °C)-98.7 °F (37.1 °C)] 98.7 °F (37.1 °C)  Heart Rate:  [53-82] 81  Resp:  [13-21] 15  BP: ()/() 131/89    Objective:  General Appearance:  Comfortable, well-appearing, in no acute distress and not in pain.    Vital signs: (most recent): Blood pressure 131/89, pulse 81, temperature 98.7 °F (37.1 °C), temperature source Oral, resp. rate 15, height 165.1 cm (65\"), weight (!) 142 kg (313 lb), SpO2 97 %.      Neurologic Exam     Mental Status   Oriented to person, place, and time.   Attention: normal. Concentration: normal.   Speech: speech is normal   Level of consciousness: alert  Follow simple commands     Cranial Nerves     CN II   Visual fields full to confrontation.     CN III, IV, VI   Pupils are equal, round, and reactive to light.  Extraocular motions are normal.     CN V   Facial sensation intact.     CN VII   Facial expression full, symmetric.     CN VIII   CN VIII normal.     CN IX, X   CN IX normal.     CN XI   CN XI normal.     Motor Exam   Muscle bulk: normal  Overall muscle tone: normal  Right arm tone: normal  Left arm tone: normal  Right arm pronator drift: absent  Left arm pronator drift: absent  Right leg tone: normal  Left leg tone: normal    Strength   Right deltoid: 5/5  Left deltoid: 5/5  Right biceps: 5/5  Left biceps: 5/5  Right triceps: 5/5  Left triceps: 5/5  Right wrist extension: 5/5  Left wrist extension: 5/5  Right iliopsoas: 5/5  Left iliopsoas: 4/5  Right quadriceps: 5/5  Right anterior tibial: 5/5  Right posterior tibial: 5/5  No dysmetria noted     Sensory Exam   Left leg light touch: decreased from knee    Gait, Coordination, and Reflexes     Tremor   Resting tremor: absent  Intention tremor: absent  Action tremor: absent    Reflexes   Right plantar: normal  Right Mcbride: absent  Left Mcbride: absent  Right ankle clonus: absent  Right " pendular knee jerk: absent  Left pendular knee jerk: absent    Drains: NA    Imaging Results (last 24 hours)     ** No results found for the last 24 hours. **        Lab Results (last 24 hours)     Procedure Component Value Units Date/Time    POC Glucose Once [[id number]]  (Abnormal) Collected:  08/09/19 0627    Specimen:  Blood Updated:  08/09/19 0640     Glucose 134 mg/dL      Comment: : [id number] [employer] GenevaMeter ID: [id number]       Osmolality, Serum [[id number]]  (Normal) Collected:  08/09/19 0521    Specimen:  Blood Updated:  08/09/19 0608     Osmolality 294 mOsm/kg     Sodium [[id number]]  (Normal) Collected:  08/09/19 0521    Specimen:  Blood Updated:  08/09/19 0555     Sodium 141 mmol/L     Basic Metabolic Panel [[id number]]  (Normal) Collected:  08/09/19 0342    Specimen:  Blood Updated:  08/09/19 0436     Glucose 100 mg/dL      BUN 15 mg/dL      Creatinine 0.94 mg/dL      Sodium 141 mmol/L      Potassium 4.2 mmol/L      Chloride 109 mmol/L      CO2 28.0 mmol/L      Calcium 8.6 mg/dL      eGFR Non African Amer 81 mL/min/1.73      BUN/Creatinine Ratio 16.0     Anion Gap 4.0 mmol/L     Narrative:       GFR Normal >60  Chronic Kidney Disease <60  Kidney Failure <15    Osmolality, Serum [[id number]]  (Normal) Collected:  08/08/19 2347    Specimen:  Blood Updated:  08/09/19 0041     Osmolality 296 mOsm/kg     Sodium [[id number]]  (Normal) Collected:  08/08/19 2347    Specimen:  Blood Updated:  08/09/19 0022     Sodium 141 mmol/L     Potassium [[id number]]  (Normal) Collected:  08/08/19 2347    Specimen:  Blood Updated:  08/09/19 0022     Potassium 4.2 mmol/L     POC Glucose Once [[id number]]  (Abnormal) Collected:  08/08/19 2353    Specimen:  Blood Updated:  08/09/19 0004     Glucose 133 mg/dL      Comment: : [id number] [employer] [place]eter ID: [id number]       Potassium [[id number]]  (Normal) Collected:  08/08/19 1958    Specimen:  Blood Updated:  08/08/19 2035     Potassium 3.9 mmol/L     Sodium  [[id number]]  (Normal) Collected:  08/08/19 1807    Specimen:  Blood Updated:  08/08/19 1848     Sodium 139 mmol/L     POC Glucose Once [[id number]]  (Normal) Collected:  08/08/19 1648    Specimen:  Blood Updated:  08/08/19 1710     Glucose 105 mg/dL      Comment: : [id number] [employer] AmandaMeter ID: [id number]       Osmolality, Serum [[id number]]  (Normal) Collected:  08/08/19 1553    Specimen:  Blood Updated:  08/08/19 1633     Osmolality 293 mOsm/kg     Potassium [[id number]]  (Normal) Collected:  08/08/19 1553    Specimen:  Blood Updated:  08/08/19 1618     Potassium 3.8 mmol/L     POC Glucose Once [[id number]]  (Normal) Collected:  08/08/19 1355    Specimen:  Blood Updated:  08/08/19 1407     Glucose 113 mg/dL      Comment: : [id number] Elmer WhitneyMeter ID: [id number]       Osmolality, Serum [[id number]]  (Normal) Collected:  08/08/19 1121    Specimen:  Blood Updated:  08/08/19 1220     Osmolality 292 mOsm/kg     Sodium [[id number]]  (Normal) Collected:  08/08/19 1121    Specimen:  Blood Updated:  08/08/19 1150     Sodium 140 mmol/L     Potassium [[id number]]  (Normal) Collected:  08/08/19 1121    Specimen:  Blood Updated:  08/08/19 1150     Potassium 3.9 mmol/L     Potassium [[id number]]  (Normal) Collected:  08/08/19 0816    Specimen:  Blood Updated:  08/08/19 0833     Potassium 3.6 mmol/L         [id number]  BRAN Benton

## 2019-08-09 NOTE — PLAN OF CARE
Problem: Patient Care Overview  Goal: Plan of Care Review  Outcome: Ongoing (interventions implemented as appropriate)   08/09/19 0302   Coping/Psychosocial   Plan of Care Reviewed With patient   Plan of Care Review   Progress no change   OTHER   Outcome Summary patient's neuro checks have been WNL considering his baseline visual deficits. Did c/o headache, nothing severe. Cardene has been titrated back and forth, currently off. Patient has been educated on importance of turning to prevent skin injury, still refusing at times, safety maintained, will continue to monitor.       Problem: Skin Injury Risk (Adult)  Goal: Identify Related Risk Factors and Signs and Symptoms  Outcome: Outcome(s) achieved Date Met: 08/09/19    Goal: Skin Health and Integrity  Outcome: Ongoing (interventions implemented as appropriate)      Problem: Fall Risk (Adult)  Goal: Identify Related Risk Factors and Signs and Symptoms  Outcome: Outcome(s) achieved Date Met: 08/09/19    Goal: Absence of Fall  Outcome: Ongoing (interventions implemented as appropriate)      Problem: Stroke (Hemorrhagic) (Adult)  Goal: Signs and Symptoms of Listed Potential Problems Will be Absent, Minimized or Managed (Stroke)  Outcome: Ongoing (interventions implemented as appropriate)

## 2019-08-09 NOTE — PROGRESS NOTES
Oral medications have been adjusted per hospitalist.  Currently not requiring nicardipine to maintain SBP <140.  SBP range .  Will transfer patient to floor.    Appreciate hospitalist.    Sabino De León, BRAN

## 2019-08-10 VITALS
TEMPERATURE: 97.9 F | WEIGHT: 313 LBS | HEIGHT: 65 IN | BODY MASS INDEX: 52.15 KG/M2 | HEART RATE: 65 BPM | SYSTOLIC BLOOD PRESSURE: 140 MMHG | RESPIRATION RATE: 18 BRPM | DIASTOLIC BLOOD PRESSURE: 69 MMHG | OXYGEN SATURATION: 97 %

## 2019-08-10 PROCEDURE — 25010000002 ONDANSETRON PER 1 MG: Performed by: NURSE PRACTITIONER

## 2019-08-10 PROCEDURE — 99238 HOSP IP/OBS DSCHRG MGMT 30/<: CPT | Performed by: NURSE PRACTITIONER

## 2019-08-10 RX ORDER — ONDANSETRON 4 MG/1
4 TABLET, FILM COATED ORAL EVERY 6 HOURS PRN
Qty: 30 TABLET | Refills: 0 | Status: SHIPPED | OUTPATIENT
Start: 2019-08-10

## 2019-08-10 RX ORDER — PSEUDOEPHEDRINE HCL 30 MG
100 TABLET ORAL 2 TIMES DAILY PRN
Qty: 60 EACH | Refills: 0 | Status: SHIPPED | OUTPATIENT
Start: 2019-08-10

## 2019-08-10 RX ORDER — AMOXICILLIN 500 MG/1
500 CAPSULE ORAL EVERY 8 HOURS SCHEDULED
Qty: 21 CAPSULE | Refills: 0 | Status: SHIPPED | OUTPATIENT
Start: 2019-08-10 | End: 2019-08-17

## 2019-08-10 RX ORDER — AMLODIPINE BESYLATE 5 MG/1
5 TABLET ORAL
Qty: 30 TABLET | Refills: 0 | Status: SHIPPED | OUTPATIENT
Start: 2019-08-10 | End: 2019-12-20

## 2019-08-10 RX ORDER — LEVETIRACETAM 500 MG/1
500 TABLET ORAL 2 TIMES DAILY
Qty: 8 TABLET | Refills: 0 | Status: SHIPPED | OUTPATIENT
Start: 2019-08-10 | End: 2019-08-14

## 2019-08-10 RX ORDER — TERAZOSIN 5 MG/1
5 CAPSULE ORAL DAILY
Qty: 30 CAPSULE | Refills: 0 | Status: SHIPPED | OUTPATIENT
Start: 2019-08-10 | End: 2019-11-01

## 2019-08-10 RX ADMIN — AMOXICILLIN 500 MG: 500 CAPSULE ORAL at 10:01

## 2019-08-10 RX ADMIN — OXYCODONE HYDROCHLORIDE AND ACETAMINOPHEN 1 TABLET: 5; 325 TABLET ORAL at 10:02

## 2019-08-10 RX ADMIN — LEVETIRACETAM 500 MG: 500 TABLET, FILM COATED ORAL at 10:01

## 2019-08-10 RX ADMIN — OXYCODONE HYDROCHLORIDE AND ACETAMINOPHEN 1 TABLET: 5; 325 TABLET ORAL at 00:00

## 2019-08-10 RX ADMIN — TERAZOSIN HYDROCHLORIDE 5 MG: 5 CAPSULE ORAL at 10:02

## 2019-08-10 RX ADMIN — AMLODIPINE BESYLATE 5 MG: 5 TABLET ORAL at 10:02

## 2019-08-10 RX ADMIN — ONDANSETRON 4 MG: 2 INJECTION INTRAMUSCULAR; INTRAVENOUS at 03:56

## 2019-08-10 RX ADMIN — BUSPIRONE HYDROCHLORIDE 5 MG: 5 TABLET ORAL at 10:01

## 2019-08-10 RX ADMIN — SODIUM CHLORIDE, PRESERVATIVE FREE 3 ML: 5 INJECTION INTRAVENOUS at 10:03

## 2019-08-10 NOTE — PLAN OF CARE
Problem: Patient Care Overview  Goal: Plan of Care Review  Outcome: Outcome(s) achieved Date Met: 08/10/19   08/10/19 4910   Coping/Psychosocial   Plan of Care Reviewed With patient   Plan of Care Review   Progress improving   OTHER   Outcome Summary Patient A&O, no neuro changes. C/O headache with prn pain med given. Discharged home.       Problem: Skin Injury Risk (Adult)  Goal: Skin Health and Integrity  Outcome: Outcome(s) achieved Date Met: 08/10/19      Problem: Fall Risk (Adult)  Goal: Absence of Fall  Outcome: Outcome(s) achieved Date Met: 08/10/19      Problem: Stroke (Hemorrhagic) (Adult)  Goal: Signs and Symptoms of Listed Potential Problems Will be Absent, Minimized or Managed (Stroke)  Outcome: Outcome(s) achieved Date Met: 08/10/19

## 2019-08-10 NOTE — PROGRESS NOTES
Neurosurgery Daily Progress Note    Assessment:   Michael Sorto is a 64 y.o. 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  He presents with a new problem of worsening headaches and nausea.  Physical exam findings of alert and oriented, no pronator drift or dysmetria, follow simple commands, GCS 15; edema, erythema, and warmth to right lower extremity.  Their imaging shows an acute parafalcine, left tentorial and left convexity subdural hemorrhage, left occipital intraparenchymal hemorrhage with surrounding edema, left subdural hemorrhage with a 4 mm left to right midline shift, and an area in the right frontal parietal region which appears to reflect chronic ischemia.    DDX:     Nontraumatic intracerebral hemorrhage (CMS/HCC)    Headache    Nausea    Hypertension    Morbid obesity (CMS/HCC)    Cellulitis of left lower extremity    Intracerebral hemorrhage (CMS/HCC)    Patient Active Problem List   Diagnosis   • Headache   • Nausea   • Nontraumatic intracerebral hemorrhage (CMS/HCC)   • Hypertension   • Morbid obesity (CMS/HCC)   • Cellulitis of left lower extremity   • Intracerebral hemorrhage (CMS/HCC)     Plan:   Neuro: Stable.  Neuro intact/unchanged   Repeat CT head stable   Neurochecks every 4 hours   Prophylactic Keppra      CV: SBP  on oral meds.     Pulm: Maintaining O2 sat.  Continuous pulse oximetry.  Incentive spirometry  : Voiding spontaneously  FEN: Tolerating oral diet.  GI: DAVID.  ID: Amoxicillin for treatment of RLE cellulitis; CRP 3.03   Appreciate hospitalist  Heme:  DVT prophylaxis held for brain bleed; SCD's ordered  Pain: Tolerable at present  Dispo: PT/OT    Discussed Mr. Sorto's case with Dr. Littlejohn.  Since being transferred to the floor, Mr. Villars blood pressure has remained within  "normal limits and his neuro status is unchanged.  CT of the head is unchanged.  We will plan to discharge home today with follow-up in the neurosurgical clinic.    Chief complaint:   Worsening headache and nausea    Subjective  Symptoms stable.  Doing well    Temp:  [97.5 °F (36.4 °C)-98.5 °F (36.9 °C)] 97.9 °F (36.6 °C)  Heart Rate:  [] 65  Resp:  [12-19] 18  BP: ()/(49-83) 140/69    Objective:  General Appearance:  Comfortable, well-appearing, in no acute distress and not in pain.    Vital signs: (most recent): Blood pressure 140/69, pulse 65, temperature 97.9 °F (36.6 °C), temperature source Oral, resp. rate 18, height 165.1 cm (65\"), weight (!) 142 kg (313 lb), SpO2 97 %.      Neurologic Exam     Mental Status   Oriented to person, place, and time.   Attention: normal. Concentration: normal.   Speech: speech is normal   Level of consciousness: alert  Follow simple commands     Cranial Nerves     CN II   Visual fields full to confrontation.     CN III, IV, VI   Pupils are equal, round, and reactive to light.  Extraocular motions are normal.     CN V   Facial sensation intact.     CN VII   Facial expression full, symmetric.     CN VIII   CN VIII normal.     CN IX, X   CN IX normal.     CN XI   CN XI normal.     Motor Exam   Muscle bulk: normal  Overall muscle tone: normal  Right arm tone: normal  Left arm tone: normal  Right arm pronator drift: absent  Left arm pronator drift: absent  Right leg tone: normal  Left leg tone: normal    Strength   Right deltoid: 5/5  Left deltoid: 5/5  Right biceps: 5/5  Left biceps: 5/5  Right triceps: 5/5  Left triceps: 5/5  Right wrist extension: 5/5  Left wrist extension: 5/5  Right iliopsoas: 5/5  Left iliopsoas: 4/5  Right quadriceps: 5/5  Right anterior tibial: 5/5  Right posterior tibial: 5/5  No dysmetria noted     Sensory Exam   Left leg light touch: decreased from knee    Gait, Coordination, and Reflexes     Tremor   Resting tremor: absent  Intention tremor: " absent  Action tremor: absent    Reflexes   Right plantar: normal  Right Mcbride: absent  Left Mcbride: absent  Right ankle clonus: absent  Right pendular knee jerk: absent  Left pendular knee jerk: absent    Drains: NA    Imaging Results (last 24 hours)     Procedure Component Value Units Date/Time    CT Head Without Contrast [[id number]] Collected:  08/09/19 1010     Updated:  08/09/19 1028    Narrative:       EXAM: CT OF THE HEAD WITHOUT IV CONTRAST 08/09/2019     COMPARISON: Head CT dated 08/07/2019.      INDICATION: Male, 64 years-old. Intracranial hemorrhage      PROCEDURE: Non contrast enhanced head CT was performed.      Radiation dose equals DLP 1294 mGy-cm.  Automated exposure control dose  reduction technique was implemented.     FINDINGS:     Since most recent head CT dated 08/07/2019, there has been no  significant interval change. Specifically, there is redemonstration of  the left occipital parenchyma hematoma measuring 3.8 x 2.7 cm, grossly  unchanged given differences in techniques. Left 5 m thick subdural  hemorrhage, unchanged. Similar involvement of the left tentorial leaf in  the posterior parafalcine region. Similar effacement of the sulci on the  left. Left to right midline shift, approximately 5 mm.     Prior insult in the right parietal lobe redemonstrated. The gray-white  differentiation is otherwise grossly intact.       Impression:       Delayed IMPRESSION:   1.  No interval changes.  2.  Acute left parenchymal hematoma, unchanged.  3.  Parafalcine, left tentorial and hemispheric subdural hemorrhage,  unchanged.  This report was finalized on 08/09/2019 10:25 by Dr. Lian Garcia MD.        Lab Results (last 24 hours)     Procedure Component Value Units Date/Time    C-reactive Protein [[id number]]  (Abnormal) Collected:  08/09/19 0739    Specimen:  Blood Updated:  08/09/19 0945     C-Reactive Protein 3.03 mg/dL         [id number]  Sabino De León, APRN

## 2019-08-10 NOTE — PLAN OF CARE
Problem: Patient Care Overview  Goal: Plan of Care Review  Outcome: Ongoing (interventions implemented as appropriate)   08/09/19 1900   Coping/Psychosocial   Plan of Care Reviewed With patient;friend   Plan of Care Review   Progress no change   OTHER   Outcome Summary Patient transfer from unit after BP stabilized, c/o HA and nausea after transfer and again after we changed his bed to one with a hand remote to allow patient to change HOB etc, neuro checks intact and following all commands, able to feed self and complete self care and use urinal, refusing turns at times and education provided on importance, will continue to monitor and notify MD of any changes

## 2019-08-10 NOTE — DISCHARGE SUMMARY
Date of Discharge:  8/10/2019    Discharge Diagnosis:   Patient Active Problem List   Diagnosis   • Headache   • Nausea   • Nontraumatic intracerebral hemorrhage (CMS/HCC)   • Hypertension   • Morbid obesity (CMS/HCC)   • Cellulitis of left lower extremity   • Intracerebral hemorrhage (CMS/HCC)     1. Impaired cognition    2. Decreased functional mobility and endurance    3. Decreased activities of daily living (ADL)    4. 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 (CMS/HCC)      Presenting Problem/History of Present Illness  Intracerebral hemorrhage (CMS/HCC) [I61.9]   1. Impaired cognition    2. Decreased functional mobility and endurance    3. Decreased activities of daily living (ADL)    4. 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 (CMS/HCC)      Hospital Course  Patient is a 64 y.o. 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  He presents with a new problem of worsening headaches and nausea.  Physical exam findings of alert and oriented, no pronator drift or dysmetria, follow simple commands, GCS 15; edema, erythema, and warmth to right lower extremity.  Their imaging shows an acute parafalcine, left tentorial and left convexity subdural hemorrhage, left occipital intraparenchymal hemorrhage with surrounding edema, left subdural hemorrhage with a 4 mm left to right midline shift, and an area in the right frontal parietal region which appears to reflect chronic ischemia.    Mr. Sorto has been kept in the hospital for close neurologic monitoring, blood pressure monitoring, airway observation, and for pain control.  Since admission, serial imaging of the brain shows an unchanged intracranial hemorrhage.   With the assistance of hospital services, Mr. Sorto's  blood pressure medications have been adjusted and have since remained within normal limits.  Mr. Sorto has remained neurologically intact and asymptomatic.  He has been able to ambulate with assistance, tolerate oral diet and oral medications, and void.  He has been evaluated by occupational therapy and physical therapy.  He has been deemed safe for discharge home.  He will be provided with an ample course of prophylactic antiseizure  Medications as well as medications for blood pressure control.  He will be discharged home with family.  Additional instructions provided.      Procedures Performed  None      Consults:   Consults     Date and Time Order Name Status Description    8/7/2019 1644 Inpatient Hospitalist Consult          Pertinent Test Results: Ct Angiogram Head With & Without Contrast    Result Date: 8/7/2019  1.. Essentially normal posterior circulation without evidence of focal steno-occlusive disease or discrete berry aneurysm. 2. Mild calcific plaquing involving the cavernous and supraclinoid aspect of both ICAs with the anterior circulation otherwise unremarkable. 3. Left occipital intraparenchymal hematoma with surrounding vasogenic edema. There is also a subdural component which is parafalcine and left tentorial in location measuring less than a centimeter in thickness. This report was finalized on 08/07/2019 22:44 by Dr. Caleb Moreno MD.    Ct Head Without Contrast    Result Date: 8/9/2019  Delayed IMPRESSION: 1.  No interval changes. 2.  Acute left parenchymal hematoma, unchanged. 3.  Parafalcine, left tentorial and hemispheric subdural hemorrhage, unchanged. This report was finalized on 08/09/2019 10:25 by Dr. Lian Garcia MD.    Ct Head Without Contrast    Result Date: 8/7/2019  Impression:  1. Left occipital intraparenchymal hematoma measuring up to 4.3 cm. 2. Acute left cerebral convexity, parafalcine and left tentorial subdural hemorrhage. This measures up to 0.6 cm in thickness  along the left frontal convexity. 3. Associated 4 mm left-to-right midline shift. No trapping of the right lateral ventricle. This report was finalized on 08/07/2019 14:52 by Dr Clayton Samayoa, .    Condition on Discharge:  stable    Vital Signs  Temp:  [97.5 °F (36.4 °C)-98.5 °F (36.9 °C)] 97.9 °F (36.6 °C)  Heart Rate:  [] 65  Resp:  [12-19] 18  BP: ()/(49-83) 140/69    Physical Exam:   Physical Exam   Constitutional: He is oriented to person, place, and time. He appears well-developed and well-nourished.  Non-toxic appearance. He does not have a sickly appearance. He does not appear ill. No distress.   BMI 52.09   HENT:   Head: Normocephalic and atraumatic.   Right Ear: Hearing normal.   Left Ear: Hearing normal.   Mouth/Throat: Mucous membranes are normal.   Eyes: Conjunctivae and EOM are normal. Pupils are equal, round, and reactive to light.   Neck: Trachea normal and full passive range of motion without pain. Neck supple.   Cardiovascular: Normal rate and regular rhythm.   Pulmonary/Chest: Effort normal. No accessory muscle usage. No apnea, no tachypnea and no bradypnea. No respiratory distress.   Abdominal: Soft. Normal appearance.   Musculoskeletal:   Left BKA   Neurological: He is alert and oriented to person, place, and time.   Skin: Skin is warm, dry and intact.   Mild edema and erythema to the right lower extremity, improved since admission.   Psychiatric: He has a normal mood and affect. His speech is normal and behavior is normal.   Nursing note and vitals reviewed.       Neurologic Exam     Mental Status   Oriented to person, place, and time.   Attention: normal. Concentration: normal.   Speech: speech is normal   Level of consciousness: alert  Follow simple commands.  Shows thumbs up into fingers bilaterally     Cranial Nerves     CN II   Visual fields full to confrontation.     CN III, IV, VI   Pupils are equal, round, and reactive to light.  Extraocular motions are normal.     CN V    Facial sensation intact.     CN VII   Facial expression full, symmetric.     CN VIII   CN VIII normal.     CN IX, X   CN IX normal.     CN XI   CN XI normal.     Motor Exam   Muscle bulk: normal  Overall muscle tone: normal  Right arm tone: normal  Left arm tone: normal  Right arm pronator drift: absent  Left arm pronator drift: absent  Right leg tone: normal  Left leg tone: normal    Strength   Right deltoid: 5/5  Left deltoid: 5/5  Right biceps: 5/5  Left biceps: 5/5  Right triceps: 5/5  Left triceps: 5/5  Right wrist extension: 5/5  Left wrist extension: 5/5  Right iliopsoas: 5/5  Left iliopsoas: 4/5  Right quadriceps: 5/5  Right anterior tibial: 5/5  Right posterior tibial: 5/5  No pronator drift or dysmetria noted     Sensory Exam   Left leg light touch: decreased from knee    Gait, Coordination, and Reflexes     Tremor   Resting tremor: absent  Intention tremor: absent  Action tremor: absent    Reflexes   Right plantar: normal  Right Mcbride: absent  Left Mcbride: absent  Right ankle clonus: absent  Right pendular knee jerk: absent  Left pendular knee jerk: absent    Discharge Disposition  Home or Self Care    Discharge Medications     Discharge Medications      New Medications      Instructions Start Date   amLODIPine 5 MG tablet  Commonly known as:  NORVASC   5 mg, Oral, Every 24 Hours Scheduled      amoxicillin 500 MG capsule  Commonly known as:  AMOXIL   500 mg, Oral, Every 8 Hours Scheduled      docusate sodium 100 MG capsule   100 mg, Oral, 2 Times Daily PRN      levETIRAcetam 500 MG tablet  Commonly known as:  KEPPRA   500 mg, Oral, 2 Times Daily      ondansetron 4 MG tablet  Commonly known as:  ZOFRAN   4 mg, Oral, Every 6 Hours PRN      terazosin 5 MG capsule  Commonly known as:  HYTRIN   5 mg, Oral, Daily         Continue These Medications      Instructions Start Date   busPIRone 5 MG tablet  Commonly known as:  BUSPAR   5 mg, Oral, Every 12 Hours Scheduled      carvedilol 3.125 MG tablet  Commonly  known as:  COREG   3.125 mg, Oral, Every 12 Hours Scheduled      citalopram 10 MG tablet  Commonly known as:  CeleXA   10 mg, Oral, Daily      traMADol 50 MG tablet  Commonly known as:  ULTRAM   50 mg, Oral, Every 6 Hours PRN         Stop These Medications    diclofenac 75 MG EC tablet  Commonly known as:  VOLTAREN          Discharge Diet:   Diet Instructions     Advance Diet As Tolerated           Activity at Discharge:   Activity Instructions     Activity as Tolerated      Gradually Increase Activity Until at Pre-Hospitalization Level           Follow-up Appointments  No future appointments.  Additional Instructions for the Follow-ups that You Need to Schedule     Discharge Follow-up with Specified Provider: BRAN Feldman; 6 Weeks   As directed      To:  BRAN Feldman    Follow Up:  6 Weeks    Follow Up Details:  CT of the head prior to appointment         CT Head Without Contrast   Sep 10, 2019          Test Results Pending at Discharge    BRAN Benton  08/10/19  9:23 AM    Time: Discharge 35 min

## 2019-08-10 NOTE — PAYOR COMM NOTE
"Dc home 8-10-19  [id number]    Shirley Sorto (64 y.o. Male)     Date of Birth Social Security Number Address Home Phone MRN    1955  [id number] [place] [address] [phone] [id number]    Jainism Marital Status          [facility] Single       Admission Date Admission Type Admitting Provider Attending Provider Department, Room/Bed    8/7/19 Urgent Silvestre Littlejohn MD  [facility] 3A, 335/1    Discharge Date Discharge Disposition Discharge Destination        8/10/2019 Home or Self Care              Attending Provider:  (none)   Allergies:  No Known Allergies    Isolation:  None   Infection:  None   Code Status:  Prior    Ht:  165.1 cm (65\")   Wt:  142 kg (313 lb)    Admission Cmt:  None   Principal Problem:  Nontraumatic intracerebral hemorrhage (CMS/HCC) [I61.9]                 Active Insurance as of 8/7/2019     Primary Coverage     Payor Plan Insurance Group Employer/Plan Group    HUMANA MEDICARE REPLACEMENT HUMANA MEDICARE REPL [id number]     Payor Plan Address Payor Plan Phone Number Payor Plan Fax Number Effective Dates    [address] [address] [phone]  8/1/2019 - None Entered    [facility] [id number]       Subscriber Name Subscriber Birth Date Member ID       SHIRLEY SORTO 1955 [id number]                 Emergency Contacts      (Rel.) Home Phone Work Phone Mobile Phone    GLENNZACH (Friend) -- -- [phone]    Stephanie Jean-Baptiste (Other) -- -- [phone]               Discharge Summary      Sabino De León, APRN at 8/10/2019  9:23 AM          Date of Discharge:  8/10/2019    Discharge Diagnosis:   Patient Active Problem List   Diagnosis   • Headache   • Nausea   • Nontraumatic intracerebral hemorrhage (CMS/HCC)   • Hypertension   • Morbid obesity (CMS/HCC)   • Cellulitis of left lower extremity   • Intracerebral hemorrhage (CMS/HCC)     1. Impaired cognition    2. Decreased functional mobility and endurance    3. Decreased activities of daily living (ADL)  "   4. 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 (CMS/HCC)      Presenting Problem/History of Present Illness  Intracerebral hemorrhage (CMS/HCC) [I61.9]   1. Impaired cognition    2. Decreased functional mobility and endurance    3. Decreased activities of daily living (ADL)    4. 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 (CMS/HCC)      Hospital Course  Patient is a 64 y.o. 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  He presents with a new problem of worsening headaches and nausea.  Physical exam findings of alert and oriented, no pronator drift or dysmetria, follow simple commands, GCS 15; edema, erythema, and warmth to right lower extremity.  Their imaging shows an acute parafalcine, left tentorial and left convexity subdural hemorrhage, left occipital intraparenchymal hemorrhage with surrounding edema, left subdural hemorrhage with a 4 mm left to right midline shift, and an area in the right frontal parietal region which appears to reflect chronic ischemia.    Mr. Sorto has been kept in the hospital for close neurologic monitoring, blood pressure monitoring, airway observation, and for pain control.  Since admission, serial imaging of the brain shows an unchanged intracranial hemorrhage.   With the assistance of hospital services, Mr. Sorto's blood pressure medications have been adjusted and have since remained within normal limits.  Mr. Sorto has remained neurologically intact and asymptomatic.  He has been able to ambulate with assistance, tolerate oral diet and oral medications, and void.  He has been evaluated by occupational therapy and physical therapy.  He has been deemed safe for discharge home.  He will be provided with an ample course of  prophylactic antiseizure  Medications as well as medications for blood pressure control.  He will be discharged home with family.  Additional instructions provided.      Procedures Performed  None      Consults:   Consults     Date and Time Order Name Status Description    8/7/2019 1644 Inpatient Hospitalist Consult          Pertinent Test Results: Ct Angiogram Head With & Without Contrast    Result Date: 8/7/2019  1.. Essentially normal posterior circulation without evidence of focal steno-occlusive disease or discrete berry aneurysm. 2. Mild calcific plaquing involving the cavernous and supraclinoid aspect of both ICAs with the anterior circulation otherwise unremarkable. 3. Left occipital intraparenchymal hematoma with surrounding vasogenic edema. There is also a subdural component which is parafalcine and left tentorial in location measuring less than a centimeter in thickness. This report was finalized on 08/07/2019 22:44 by Dr. Caleb Moreno MD.    Ct Head Without Contrast    Result Date: 8/9/2019  Delayed IMPRESSION: 1.  No interval changes. 2.  Acute left parenchymal hematoma, unchanged. 3.  Parafalcine, left tentorial and hemispheric subdural hemorrhage, unchanged. This report was finalized on 08/09/2019 10:25 by Dr. Lian Garcia MD.    Ct Head Without Contrast    Result Date: 8/7/2019  Impression:  1. Left occipital intraparenchymal hematoma measuring up to 4.3 cm. 2. Acute left cerebral convexity, parafalcine and left tentorial subdural hemorrhage. This measures up to 0.6 cm in thickness along the left frontal convexity. 3. Associated 4 mm left-to-right midline shift. No trapping of the right lateral ventricle. This report was finalized on 08/07/2019 14:52 by Dr Clayton Samayoa, .    Condition on Discharge:  stable    Vital Signs  Temp:  [97.5 °F (36.4 °C)-98.5 °F (36.9 °C)] 97.9 °F (36.6 °C)  Heart Rate:  [] 65  Resp:  [12-19] 18  BP: ()/(49-83) 140/69    Physical Exam:   Physical Exam    Constitutional: He is oriented to person, place, and time. He appears well-developed and well-nourished.  Non-toxic appearance. He does not have a sickly appearance. He does not appear ill. No distress.   BMI 52.09   HENT:   Head: Normocephalic and atraumatic.   Right Ear: Hearing normal.   Left Ear: Hearing normal.   Mouth/Throat: Mucous membranes are normal.   Eyes: Conjunctivae and EOM are normal. Pupils are equal, round, and reactive to light.   Neck: Trachea normal and full passive range of motion without pain. Neck supple.   Cardiovascular: Normal rate and regular rhythm.   Pulmonary/Chest: Effort normal. No accessory muscle usage. No apnea, no tachypnea and no bradypnea. No respiratory distress.   Abdominal: Soft. Normal appearance.   Musculoskeletal:   Left BKA   Neurological: He is alert and oriented to person, place, and time.   Skin: Skin is warm, dry and intact.   Mild edema and erythema to the right lower extremity, improved since admission.   Psychiatric: He has a normal mood and affect. His speech is normal and behavior is normal.   Nursing note and vitals reviewed.       Neurologic Exam     Mental Status   Oriented to person, place, and time.   Attention: normal. Concentration: normal.   Speech: speech is normal   Level of consciousness: alert  Follow simple commands.  Shows thumbs up into fingers bilaterally     Cranial Nerves     CN II   Visual fields full to confrontation.     CN III, IV, VI   Pupils are equal, round, and reactive to light.  Extraocular motions are normal.     CN V   Facial sensation intact.     CN VII   Facial expression full, symmetric.     CN VIII   CN VIII normal.     CN IX, X   CN IX normal.     CN XI   CN XI normal.     Motor Exam   Muscle bulk: normal  Overall muscle tone: normal  Right arm tone: normal  Left arm tone: normal  Right arm pronator drift: absent  Left arm pronator drift: absent  Right leg tone: normal  Left leg tone: normal    Strength   Right deltoid:  5/5  Left deltoid: 5/5  Right biceps: 5/5  Left biceps: 5/5  Right triceps: 5/5  Left triceps: 5/5  Right wrist extension: 5/5  Left wrist extension: 5/5  Right iliopsoas: 5/5  Left iliopsoas: 4/5  Right quadriceps: 5/5  Right anterior tibial: 5/5  Right posterior tibial: 5/5  No pronator drift or dysmetria noted     Sensory Exam   Left leg light touch: decreased from knee    Gait, Coordination, and Reflexes     Tremor   Resting tremor: absent  Intention tremor: absent  Action tremor: absent    Reflexes   Right plantar: normal  Right Mcbride: absent  Left Mcbride: absent  Right ankle clonus: absent  Right pendular knee jerk: absent  Left pendular knee jerk: absent    Discharge Disposition  Home or Self Care    Discharge Medications     Discharge Medications      New Medications      Instructions Start Date   amLODIPine 5 MG tablet  Commonly known as:  NORVASC   5 mg, Oral, Every 24 Hours Scheduled      amoxicillin 500 MG capsule  Commonly known as:  AMOXIL   500 mg, Oral, Every 8 Hours Scheduled      docusate sodium 100 MG capsule   100 mg, Oral, 2 Times Daily PRN      levETIRAcetam 500 MG tablet  Commonly known as:  KEPPRA   500 mg, Oral, 2 Times Daily      ondansetron 4 MG tablet  Commonly known as:  ZOFRAN   4 mg, Oral, Every 6 Hours PRN      terazosin 5 MG capsule  Commonly known as:  HYTRIN   5 mg, Oral, Daily         Continue These Medications      Instructions Start Date   busPIRone 5 MG tablet  Commonly known as:  BUSPAR   5 mg, Oral, Every 12 Hours Scheduled      carvedilol 3.125 MG tablet  Commonly known as:  COREG   3.125 mg, Oral, Every 12 Hours Scheduled      citalopram 10 MG tablet  Commonly known as:  CeleXA   10 mg, Oral, Daily      traMADol 50 MG tablet  Commonly known as:  ULTRAM   50 mg, Oral, Every 6 Hours PRN         Stop These Medications    diclofenac 75 MG EC tablet  Commonly known as:  VOLTAREN          Discharge Diet:   Diet Instructions     Advance Diet As Tolerated           Activity at  Discharge:   Activity Instructions     Activity as Tolerated      Gradually Increase Activity Until at Pre-Hospitalization Level           Follow-up Appointments  No future appointments.  Additional Instructions for the Follow-ups that You Need to Schedule     Discharge Follow-up with Specified Provider: BRAN Feldman; 6 Weeks   As directed      To:  BRAN Feldman    Follow Up:  6 Weeks    Follow Up Details:  CT of the head prior to appointment         CT Head Without Contrast   Sep 10, 2019          Test Results Pending at Discharge    BRAN Benton  08/10/19  9:23 AM    Time: Discharge 35 min      Electronically signed by Sabino De León APRN at 8/10/2019  9:36 AM

## 2019-08-10 NOTE — PLAN OF CARE
Problem: Patient Care Overview  Goal: Plan of Care Review  Outcome: Ongoing (interventions implemented as appropriate)   08/10/19 0528   Coping/Psychosocial   Plan of Care Reviewed With patient   Plan of Care Review   Progress no change   OTHER   Outcome Summary Pt A&O, no neuro changes. Pt felt nauseous when head was lowered for repositioning. CO headache, prn pain meds as ordered.        Problem: Skin Injury Risk (Adult)  Goal: Skin Health and Integrity  Outcome: Ongoing (interventions implemented as appropriate)      Problem: Fall Risk (Adult)  Goal: Absence of Fall  Outcome: Ongoing (interventions implemented as appropriate)      Problem: Stroke (Hemorrhagic) (Adult)  Goal: Signs and Symptoms of Listed Potential Problems Will be Absent, Minimized or Managed (Stroke)  Outcome: Ongoing (interventions implemented as appropriate)

## 2019-08-10 NOTE — THERAPY DISCHARGE NOTE
Acute Care - Occupational Therapy Discharge Summary  [facility]     Patient Name: Michael Sorto  : 1955  MRN: [id number]    Today's Date: 8/10/2019  Onset of Illness/Injury or Date of Surgery: 19    Date of Referral to OT: 19  Referring Physician: Dr. Littlejohn       Admit Date: 2019        OT Recommendation and Plan    Visit Dx:    ICD-10-CM ICD-9-CM   1. Impaired cognition R41.89 294.9   2. Decreased functional mobility and endurance Z74.09 780.99   3. Decreased activities of daily living (ADL) R68.89 780.99   4. 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 (CMS/HCC) I61.0 431               Rehab Goal Summary     Row Name 08/10/19 1500             Transfer Goal 1 (OT)    Activity/Assistive Device (Transfer Goal 1, OT)  transfers, all  -AC      [place] Level/Cues Needed (Transfer Goal 1, OT)  standby assist  -AC      Time Frame (Transfer Goal 1, OT)  long term goal (LTG);by discharge  -AC      Progress/Outcome (Transfer Goal 1, OT)  goal not met  -AC         Dressing Goal 1 (OT)    Activity/Assistive Device (Dressing Goal 1, OT)  dressing skills, all  -AC      [place]/Cues Needed (Dressing Goal 1, OT)  supervision required  -AC      Time Frame (Dressing Goal 1, OT)  long term goal (LTG);by discharge  -AC      Progress/Outcome (Dressing Goal 1, OT)  goal not met  -AC         Strength Goal 1 (OT)    Strength Goal 1 (OT)  Pt will increase BUE strength to 4+/5 for increased independence with adls, t/fs, and mobility.   -AC      Time Frame (Strength Goal 1, OT)  long term goal (LTG);by discharge  -AC      Progress/Outcome (Strength Goal 1, OT)  goal not met  -AC        User Key  (r) = Recorded By, (t) = Taken By, (c) = Cosigned By    Initials Name Provider Type Discipline    AC Av Youssef, OTR/L, CNT Occupational Therapist OT          Outcome Measures     Row Name 19 1200 19 0756          How much help from another person do you currently need...     Turning from your back to your side while in flat bed without using bedrails?  --  4  -CHEO (r) SC (t) CHEO (c)     Moving from lying on back to sitting on the side of a flat bed without bedrails?  --  3  -CHEO (r) SC (t) CHEO (c)     Moving to and from a bed to a chair (including a wheelchair)?  --  2  -CHEO (r) SC (t) CHEO (c)     Standing up from a chair using your arms (e.g., wheelchair, bedside chair)?  --  3  -CHEO (r) SC (t) CHEO (c)     Climbing 3-5 steps with a railing?  --  1  -CHEO (r) SC (t) CHEO (c)     To walk in hospital room?  --  2  -CHEO (r) SC (t) CHEO (c)     AM-PAC 6 Clicks Score (PT)  --  15  -WG (r) SC (t)        How much help from another is currently needed...    Putting on and taking off regular lower body clothing?  3  -JJ  --     Bathing (including washing, rinsing, and drying)  3  -JJ  --     Toileting (which includes using toilet bed pan or urinal)  3  -JJ  --     Putting on and taking off regular upper body clothing  4  -JJ  --     Taking care of personal grooming (such as brushing teeth)  4  -JJ  --     Eating meals  4  -JJ  --     AM-PAC 6 Clicks Score (OT)  21  -JJ  --        Functional Assessment    Outcome Measure Options  AM-PAC 6 Clicks Daily Activity (OT)  -JJ  AM-PAC 6 Clicks Basic Mobility (PT)  -CHEO (r) SC (t) CHEO (c)       User Key  (r) = Recorded By, (t) = Taken By, (c) = Cosigned By    Initials Name Provider Type    Alton Diaz, PT DPT Physical Therapist    Devora Bermudez, RN Registered Nurse    Susan Kumar, OTR/L Occupational Therapist    SC BrimhallKayden, PT Student PT Student          Therapy Suggested Charges     Code   Minutes Charges    65892 (CPT®) Hc Ot Neuromusc Re Education Ea 15 Min      80041 (CPT®) Hc Ot Ther Proc Ea 15 Min      21206 (CPT®) Hc Ot Therapeutic Act Ea 15 Min 14 1    89191 (CPT®) Hc Ot Manual Therapy Ea 15 Min      69297 (CPT®) Hc Ot Iontophoresis Ea 15 Min      81195 (CPT®) Hc Ot Elec Stim Ea-Per 15 Min      93897 (CPT®) Hc Ot Ultrasound  Ea 15 Min      27099 (CPT®) Hc Ot Self Care/Mgmt/Train Ea 15 Min 14 1    Total  28 2              OT Discharge Summary  Anticipated Discharge Disposition (OT): home with assist  Reason for Discharge: Discharge from facility  Outcomes Achieved: Refer to plan of care for updates on goals achieved  Discharge Destination: Home with assist      Av Youssef OTR/L, CNT  8/10/2019

## 2019-08-10 NOTE — THERAPY DISCHARGE NOTE
Acute Care - Physical Therapy Discharge Summary  [facility]       Patient Name: Michael Sorto  : 1955  MRN: [id number]    Today's Date: 8/10/2019  Onset of Illness/Injury or Date of Surgery: 19    Date of Referral to PT: 19  Referring Physician: Dr. Littlejohn       Admit Date: 2019      PT Recommendation and Plan    Visit Dx:    ICD-10-CM ICD-9-CM   1. Impaired cognition R41.89 294.9   2. Decreased functional mobility and endurance Z74.09 780.99   3. Decreased activities of daily living (ADL) R68.89 780.99   4. 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 (CMS/HCC) I61.0 431       Outcome Measures     Row Name 19 1200 19 0756          How much help from another person do you currently need...    Turning from your back to your side while in flat bed without using bedrails?  --  4  -CHEO (r) SC (t) CHEO (c)     Moving from lying on back to sitting on the side of a flat bed without bedrails?  --  3  -CHEO (r) SC (t) CHEO (c)     Moving to and from a bed to a chair (including a wheelchair)?  --  2  -CHEO (r) SC (t) CHEO (c)     Standing up from a chair using your arms (e.g., wheelchair, bedside chair)?  --  3  -CHEO (r) SC (t) CHEO (c)     Climbing 3-5 steps with a railing?  --  1  -CHEO (r) SC (t) CHEO (c)     To walk in hospital room?  --  2  -CHEO (r) SC (t) CHEO (c)     AM-PAC 6 Clicks Score (PT)  --  15  -WG (r) SC (t)        How much help from another is currently needed...    Putting on and taking off regular lower body clothing?  3  -JJ  --     Bathing (including washing, rinsing, and drying)  3  -JJ  --     Toileting (which includes using toilet bed pan or urinal)  3  -JJ  --     Putting on and taking off regular upper body clothing  4  -JJ  --     Taking care of personal grooming (such as brushing teeth)  4  -JJ  --     Eating meals  4  -JJ  --     AM-PAC 6 Clicks Score (OT)  21  -JJ  --        Functional Assessment    Outcome Measure Options  AM-PAC 6 Clicks Daily Activity (OT)   -[facility] 6 Clicks Basic Mobility (PT)  -CHEO (r) SC (t) CHEO (c)       User Key  (r) = Recorded By, (t) = Taken By, (c) = Cosigned By    Initials Name Provider Type    Alotn Diaz, PT DPT Physical Therapist    Devora Bermudez, RN Registered Nurse    Susan Kumar, OTR/L Occupational Therapist    SC Kayden Dunn, PT Student PT Student              Rehab Goal Summary     Row Name 08/10/19 1533 08/10/19 1500          Physical Therapy Goals    Bed Mobility Goal Selection (PT)  bed mobility, PT goal 1  -AH  --     Transfer Goal Selection (PT)  transfer, PT goal 1  -AH  --     Gait Training Goal Selection (PT)  gait training, PT goal 1  -AH  --        Bed Mobility Goal 1 (PT)    Activity/Assistive Device (Bed Mobility Goal 1, PT)  bed mobility activities, all  -AH  --     [place] Level/Cues Needed (Bed Mobility Goal 1, PT)  conditional independence  -AH  --     Time Frame (Bed Mobility Goal 1, PT)  long term goal (LTG);10 days  -AH  --     Progress/Outcomes (Bed Mobility Goal 1, PT)  goal met  -AH  --        Transfer Goal 1 (PT)    Activity/Assistive Device (Transfer Goal 1, PT)  transfers, all  -AH  --     [place] Level/Cues Needed (Transfer Goal 1, PT)  contact guard assist  -AH  --     Time Frame (Transfer Goal 1, PT)  long term goal (LTG);10 days  -AH  --     Progress/Outcome (Transfer Goal 1, PT)  goal met  -AH  --        Gait Training Goal 1 (PT)    Activity/Assistive Device (Gait Training Goal 1, PT)  gait (walking locomotion);assistive device use;forward stepping;decrease fall risk;improve balance and speed;increase endurance/gait distance;increase energy conservation;maintain weight bearing status  -AH  --     [place] Level (Gait Training Goal 1, PT)  contact guard assist  -AH  --     Distance (Gait Goal 1, PT)  50  -AH  --     Time Frame (Gait Training Goal 1, PT)  long term goal (LTG);10 days  -AH  --     Progress/Outcome (Gait Training Goal 1, PT)  goal not met  -AH   --        Transfer Goal 1 (OT)    Activity/Assistive Device (Transfer Goal 1, OT)  --  transfers, all  -AC     [place] Level/Cues Needed (Transfer Goal 1, OT)  --  standby assist  -AC     Time Frame (Transfer Goal 1, OT)  --  long term goal (LTG);by discharge  -AC     Progress/Outcome (Transfer Goal 1, OT)  --  goal not met  -AC        Dressing Goal 1 (OT)    Activity/Assistive Device (Dressing Goal 1, OT)  --  dressing skills, all  -AC     [place]/Cues Needed (Dressing Goal 1, OT)  --  supervision required  -AC     Time Frame (Dressing Goal 1, OT)  --  long term goal (LTG);by discharge  -AC     Progress/Outcome (Dressing Goal 1, OT)  --  goal not met  -AC        Strength Goal 1 (OT)    Strength Goal 1 (OT)  --  Pt will increase BUE strength to 4+/5 for increased independence with adls, t/fs, and mobility.   -AC     Time Frame (Strength Goal 1, OT)  --  long term goal (LTG);by discharge  -AC     Progress/Outcome (Strength Goal 1, OT)  --  goal not met  -AC       User Key  (r) = Recorded By, (t) = Taken By, (c) = Cosigned By    Initials Name Provider Type Discipline    AC Av Youssef, OTR/L, CNT Occupational Therapist OT    Monique Olivo PTA Physical Therapy Assistant PT              PT Discharge Summary  Reason for Discharge: Discharge from facility  Outcomes Achieved: Refer to plan of care for updates on goals achieved  Discharge Destination: Home with assist      Monique Pederson PTA   8/10/2019

## 2019-08-11 ENCOUNTER — READMISSION MANAGEMENT (OUTPATIENT)
Dept: CALL CENTER | Facility: HOSPITAL | Age: 64
End: 2019-08-11

## 2019-08-11 NOTE — OUTREACH NOTE
Prep Survey      Responses   Facility patient discharged from?  [place]   Is patient eligible?  Yes   Discharge diagnosis  Headache, Nontraumatic intracerebral hemorrhage, Nausea    Does the patient have one of the following disease processes/diagnoses(primary or secondary)?  Other   Does the patient have Home health ordered?  No   What is the Home health agency?   declined    Is there a DME ordered?  No   Medication alerts for this patient  Keppra    Prep survey completed?  Yes          Alina Rabago RN

## 2019-08-12 ENCOUNTER — READMISSION MANAGEMENT (OUTPATIENT)
Dept: CALL CENTER | Facility: HOSPITAL | Age: 64
End: 2019-08-12

## 2019-08-12 NOTE — OUTREACH NOTE
Medical Week 1 Survey      Responses   Facility patient discharged from?  [facility]   Does the patient have one of the following disease processes/diagnoses(primary or secondary)?  Other   Is there a successful TCM telephone encounter documented?  No   Week 1 attempt successful?  No   Unsuccessful attempts  Attempt 1          Mckayla Oconnor RN

## 2019-08-13 ENCOUNTER — READMISSION MANAGEMENT (OUTPATIENT)
Dept: CALL CENTER | Facility: HOSPITAL | Age: 64
End: 2019-08-13

## 2019-08-13 NOTE — OUTREACH NOTE
Medical Week 1 Survey      Responses   Facility patient discharged from?  [facility]   Does the patient have one of the following disease processes/diagnoses(primary or secondary)?  Other   Is there a successful TCM telephone encounter documented?  No   Week 1 attempt successful?  Yes   Call start time  1542   Call end time  1546   Discharge diagnosis  Headache, Nontraumatic intracerebral hemorrhage, Nausea    Is patient permission given to speak with other caregiver?  Yes   List who call center can speak with  Lance Jean-Baptiste, emergency contact   Person spoke with today (if not patient) and relationship  Lance Devriess reviewed with patient/caregiver?  Yes   Is the patient having any side effects they believe may be caused by any medication additions or changes?  No   Does the patient have all medications ordered at discharge?  Yes   Is the patient taking all medications as directed (includes completed medication regime)?  Yes   Does the patient have a primary care provider?   Yes   Does the patient have an appointment with their PCP within 7 days of discharge?  Yes   Comments regarding PCP  BRAN Todd , PCP    Has the patient kept scheduled appointments due by today?  N/A   Comments  Post-Op with Rufino Lu MD Wednesday Aug 28, 2019 8:20 AM   Has home health visited the patient within 72 hours of discharge?  N/A   Psychosocial issues?  No   Did the patient receive a copy of their discharge instructions?  Yes   Nursing interventions  Reviewed instructions with patient   What is the patient's perception of their health status since discharge?  Improving   Is the patient/caregiver able to teach back signs and symptoms related to disease process for when to call PCP?  Yes   Is the patient/caregiver able to teach back signs and symptoms related to disease process for when to call 911?  Yes   Is the patient/caregiver able to teach back the hierarchy of who to call/visit for symptoms/problems? PCP, Specialist,  Home health nurse, Urgent Care, ED, 911  Yes   Week 1 call completed?  Yes          Cassandra Cuevas RN

## 2019-08-20 ENCOUNTER — READMISSION MANAGEMENT (OUTPATIENT)
Dept: CALL CENTER | Facility: HOSPITAL | Age: 64
End: 2019-08-20

## 2019-08-20 NOTE — OUTREACH NOTE
Medical Week 2 Survey      Responses   Facility patient discharged from?  [place]   Does the patient have one of the following disease processes/diagnoses(primary or secondary)?  Other   Week 2 attempt successful?  No   Unsuccessful attempts  Attempt 1          Josephine Edward RN

## 2019-08-21 ENCOUNTER — TELEPHONE (OUTPATIENT)
Dept: ORTHOPEDIC SURGERY | Facility: CLINIC | Age: 64
End: 2019-08-21

## 2019-08-21 ENCOUNTER — READMISSION MANAGEMENT (OUTPATIENT)
Dept: CALL CENTER | Facility: HOSPITAL | Age: 64
End: 2019-08-21

## 2019-08-21 NOTE — TELEPHONE ENCOUNTER
DR BARBA.  BRAN RAJAN AT [facility] CALLED STATING THE PATIENT WAS THERE AT THE CLINIC.  HIS RIGHT KNEE LOOKED RED AND ANGRY LOOKING.  SHE WAS GOING TO GIVE HIM AN ANTIBIOTIC.  HE HAS AN APPOINTMENT WITH YOU NEXT WEEK ON Wednesday, 08/28/19.

## 2019-08-21 NOTE — OUTREACH NOTE
Medical Week 2 Survey      Responses   Facility patient discharged from?  [place]   Does the patient have one of the following disease processes/diagnoses(primary or secondary)?  Other   Week 2 attempt successful?  Yes   Call start time  1244   Discharge diagnosis  Headache, Nontraumatic intracerebral hemorrhage, Nausea    Call end time  1248   Is patient permission given to speak with other caregiver?  Yes   List who call center can speak with  Lance Jean-Baptiste, emergency contact   Meds reviewed with patient/caregiver?  Yes   Is the patient taking all medications as directed (includes completed medication regime)?  Yes   Does the patient have a primary care provider?   Yes   Has the patient kept scheduled appointments due by today?  Yes   Comments  Post-Op with Rufino Lu MD Wednesday Aug 28, 2019 8:20 AM   Psychosocial issues?  No   Did the patient receive a copy of their discharge instructions?  Yes   What is the patient's perception of their health status since discharge?  Improving   Is the patient/caregiver able to teach back signs and symptoms related to disease process for when to call PCP?  Yes   Is the patient/caregiver able to teach back signs and symptoms related to disease process for when to call 911?  Yes   Is the patient/caregiver able to teach back the hierarchy of who to call/visit for symptoms/problems? PCP, Specialist, Home health nurse, Urgent Care, ED, 911  Yes   Week 2 Call Completed?  Yes          Cassandra Cuevas RN

## 2019-08-26 NOTE — TELEPHONE ENCOUNTER
I want to have a chronological time INR where this patient is been since he left the hospital.  There has been a lot of information going back and forth, I need to know exactly what has happened.  That is, discharge summaries from the places where he has been and perhaps where he is now.  None of the follow-up that I scheduled for him has been done here.

## 2019-08-28 ENCOUNTER — READMISSION MANAGEMENT (OUTPATIENT)
Dept: CALL CENTER | Facility: HOSPITAL | Age: 64
End: 2019-08-28

## 2019-08-28 ENCOUNTER — OFFICE VISIT (OUTPATIENT)
Dept: ORTHOPEDIC SURGERY | Facility: CLINIC | Age: 64
End: 2019-08-28

## 2019-08-28 ENCOUNTER — LAB (OUTPATIENT)
Dept: LAB | Facility: HOSPITAL | Age: 64
End: 2019-08-28

## 2019-08-28 VITALS — BODY MASS INDEX: 50.65 KG/M2 | HEIGHT: 65 IN | WEIGHT: 304 LBS

## 2019-08-28 DIAGNOSIS — Z89.529 ACQUIRED ABSENCE OF KNEE JOINT FOLLOWING REMOVAL OF JOINT PROSTHESIS WITH PRESENCE OF ANTIBIOTIC-IMPREGNATED CEMENT SPACER: ICD-10-CM

## 2019-08-28 DIAGNOSIS — Z89.529 ACQUIRED ABSENCE OF KNEE JOINT FOLLOWING REMOVAL OF JOINT PROSTHESIS WITH PRESENCE OF ANTIBIOTIC-IMPREGNATED CEMENT SPACER: Primary | ICD-10-CM

## 2019-08-28 LAB
APPEARANCE FLD: ABNORMAL
COLOR FLD: ABNORMAL
CRP SERPL-MCNC: 0.73 MG/DL (ref 0–0.5)
ERYTHROCYTE [SEDIMENTATION RATE] IN BLOOD: 7 MM/HR (ref 0–20)
MONOS+MACROS NFR FLD: 71 %
NEUTROPHILS NFR FLD MANUAL: 29 %
RBC # FLD AUTO: ABNORMAL /MM3 (ref 0–0)
SPECIMEN VOL FLD: 10 ML
WBC # FLD: 142.5 /MM3 (ref 0–5)

## 2019-08-28 PROCEDURE — 85652 RBC SED RATE AUTOMATED: CPT

## 2019-08-28 PROCEDURE — 86140 C-REACTIVE PROTEIN: CPT

## 2019-08-28 PROCEDURE — 87015 SPECIMEN INFECT AGNT CONCNTJ: CPT

## 2019-08-28 PROCEDURE — 20610 DRAIN/INJ JOINT/BURSA W/O US: CPT | Performed by: ORTHOPAEDIC SURGERY

## 2019-08-28 PROCEDURE — 89051 BODY FLUID CELL COUNT: CPT | Performed by: ORTHOPAEDIC SURGERY

## 2019-08-28 PROCEDURE — 87070 CULTURE OTHR SPECIMN AEROBIC: CPT

## 2019-08-28 PROCEDURE — 87205 SMEAR GRAM STAIN: CPT

## 2019-08-28 PROCEDURE — 36415 COLL VENOUS BLD VENIPUNCTURE: CPT

## 2019-08-28 NOTE — OUTREACH NOTE
Medical Week 3 Survey      Responses   Facility patient discharged from?  [place]   Does the patient have one of the following disease processes/diagnoses(primary or secondary)?  Other   Week 3 attempt successful?  Yes   Call start time  0905   Rescheduled  Rescheduled-pt requested [currently at doctor's appt]   Call end time  0906   Discharge diagnosis  Headache, Nontraumatic intracerebral hemorrhage, Nausea           Toy Martinez RN

## 2019-08-28 NOTE — PROGRESS NOTES
Michael Sorto is a 64 y.o. male is s/p  REMOVAL OF TOTAL KNEE COMPONENTS RIGHT KNEE WITH INSERTION OF CEMENT ANTIBIOTIC SPACER - Right     Chief Complaint   Patient presents with   • Right Knee - Follow-up       HISTORY OF PRESENT ILLNESS: Patient is here today s/p REMOVAL OF TOTAL KNEE COMPONENTS RIGHT KNEE WITH INSERTION OF CEMENT ANTIBIOTIC SPACER - Right on 6/18/2019. Patient was sent to [facility] upon arrival. Patient had 6 weeks of IV antibiotics and then had antibiotics once he was admitted to the hospital in [place].  There was confusion he was sent to a facility in [facility].  He tells me he was there till August 5.  He did receive his 6 weeks of IV antibiotics with his PICC line which was prescribed.  He grew an Enterococcus faecalis and was on ampicillin to which the enterococcus was sensitive to.  He was discharged on August 5 and 2 days later he had blood pressure problems was admitted [place] for bleeding on the brain which presumably was because of his high blood pressure.  He was treated by Dr. Littlejohn in [place] for this.  He is now here for follow-up really not having a lot of pain in the knee.       No Known Allergies      Current Outpatient Medications:   •  amLODIPine (NORVASC) 5 MG tablet, Take 1 tablet by mouth Daily., Disp: 30 tablet, Rfl: 0  •  busPIRone (BUSPAR) 5 MG tablet, Take 5 mg by mouth Every 12 (Twelve) Hours., Disp: , Rfl:   •  carvedilol (COREG) 3.125 MG tablet, Take 3.125 mg by mouth Every 12 (Twelve) Hours., Disp: , Rfl:   •  ondansetron (ZOFRAN) 4 MG tablet, Take 1 tablet by mouth Every 6 (Six) Hours As Needed for Nausea or Vomiting., Disp: 30 tablet, Rfl: 0  •  terazosin (HYTRIN) 5 MG capsule, Take 1 capsule by mouth Daily., Disp: 30 capsule, Rfl: 0  •  traMADol (ULTRAM) 50 MG tablet, Take 50 mg by mouth Every 6 (Six) Hours As Needed for Moderate Pain ., Disp: , Rfl:   •  citalopram (CeleXA) 10 MG tablet, Take 10 mg by mouth Daily., Disp: , Rfl:   •  docusate sodium 100  MG capsule, Take 100 mg by mouth 2 (Two) Times a Day As Needed for Constipation., Disp: 60 each, Rfl: 0        PHYSICAL EXAMINATION:       Michael Sorto is a 64 y.o. male    Patient is awake and alert, answers questions appropriately and is in no apparent distress.    GAIT:     []  Normal  []  Antalgic    Assistive device: []  None  []  Walker     []  Crutches  []  Cane     [x]  Wheelchair  []  Stretcher    Ortho Exam  The knee has market swelling and deformity lacks full extension calf is mild swelling but not tender.  The toggle of motion about the knee the sinus tracts wounds are well-healed.    Ct Angiogram Head With & Without Contrast    Result Date: 8/7/2019  Narrative: EXAMINATION: CT angiogram of the head with contrast 08/07/2019  DOSE: 1052 mGycm. Automated exposure control was utilized to diminish patient radiation dose..  HISTORY: Intracranial hemorrhage  FINDINGS: Multiple contiguous axial images are obtained through the [employer] [place] at 1 mm intervals following intravenous contrast administration with reformatted images obtained in the sagittal and coronal projections from the original dataset as well as MIPS.  Within the left occipital lobe there is a intraparenchymal hemorrhage measuring approximate 3.2 x 2.9 cm in size. There is also some subdural hemorrhage layering along the left tentorium cerebelli and extending into the posterior interhemispheric fissure  The distal vertebral arteries are widely patent. The basilar artery is normal in caliber. The superior cerebellar and posterior cerebral arteries are also normal in caliber without evidence of focal steno-occlusive disease or discrete berry aneurysm.  There is mild calcific plaquing involving the cavernous and supraclinoid aspect of both ICAs without evidence of high-grade stenosis. The anterior and middle cerebral arteries are normal in caliber without evidence of discrete intraluminal thrombus, focal steno-occlusive disease or discrete  berry aneurysm. No discrete vascular malformations are demonstrated. No evidence of dural venous sinus thrombosis. There is some asymmetry within the left transverse sinus which could be in part related to some mass effect related to the rind of edema surrounding the intraparenchymal hemorrhage. There is also hypodensity within the right posterior parietal and occipital lobe which has the appearance of a subacute infarct.      Impression: 1.. Essentially normal posterior circulation without evidence of focal steno-occlusive disease or discrete berry aneurysm. 2. Mild calcific plaquing involving the cavernous and supraclinoid aspect of both ICAs with the anterior circulation otherwise unremarkable. 3. Left occipital intraparenchymal hematoma with surrounding vasogenic edema. There is also a subdural component which is parafalcine and left tentorial in location measuring less than a centimeter in thickness. This report was finalized on 08/07/2019 22:44 by Dr. Caleb Moreno MD.    Ct Head Without Contrast    Result Date: 8/9/2019  Narrative: EXAM: CT OF THE HEAD WITHOUT IV CONTRAST 08/09/2019  COMPARISON: Head CT dated 08/07/2019.  INDICATION: Male, 64 years-old. Intracranial hemorrhage  PROCEDURE: Non contrast enhanced head CT was performed.  Radiation dose equals DLP 1294 mGy-cm.  Automated exposure control dose reduction technique was implemented.  FINDINGS:  Since most recent head CT dated 08/07/2019, there has been no significant interval change. Specifically, there is redemonstration of the left occipital parenchyma hematoma measuring 3.8 x 2.7 cm, grossly unchanged given differences in techniques. Left 5 m thick subdural hemorrhage, unchanged. Similar involvement of the left tentorial leaf in the posterior parafalcine region. Similar effacement of the sulci on the left. Left to right midline shift, approximately 5 mm.  Prior insult in the right parietal lobe redemonstrated. The gray-white differentiation is  otherwise grossly intact.      Impression: Delayed IMPRESSION: 1.  No interval changes. 2.  Acute left parenchymal hematoma, unchanged. 3.  Parafalcine, left tentorial and hemispheric subdural hemorrhage, unchanged. This report was finalized on 08/09/2019 10:25 by Dr. Lian Garcia MD.    Ct Head Without Contrast    Result Date: 8/7/2019  Narrative: CT HEAD WO CONTRAST- 8/7/2019 1:48 PM CDT  HISTORY: Intracranial hemorrhage   DOSE LENGTH PRODUCT: 542 mGy cm. Automated exposure control was also utilized to decrease patient radiation dose.  Technique: Axial CT of the brain without IV contrast. Sagittal and coronal reformations are also provided for review. Soft tissue and bone kernels are available for interpretation.  Comparison: None.  Findings:  Approximately 3.6 x 4.3 x 2.5 cm left occipital intraparenchymal hematoma (series 7 image image 18 and series 3-image 17). There is a collar of surrounding mild to moderate vasogenic edema. There is an area of low-attenuation in the right frontoparietal region which appears to reflect an area of subacute to chronic ischemia. Acute parafalcine, left tentorial and left convexity subdural hemorrhage is also identified. The left cerebral convexity components of the subdural hemorrhage measures up to 0.6 cm in greatest thickness. Mass effect from this hemorrhage effaces the left lateral ventricle and there is an approximately 4 mm left-to-right midline shift. The third ventricle is also effaced. No trapping of the right lateral ventricle. The basal cisterns are symmetric. No subarachnoid hemorrhage identified. No calvarial fracture.  Small retention cyst in the left sphenoid sinus. The paranasal sinuses are otherwise clear. The mastoids are clear.      Impression: Impression:  1. Left occipital intraparenchymal hematoma measuring up to 4.3 cm. 2. Acute left cerebral convexity, parafalcine and left tentorial subdural hemorrhage. This measures up to 0.6 cm in thickness along the  left frontal convexity. 3. Associated 4 mm left-to-right midline shift. No trapping of the right lateral ventricle. This report was finalized on 08/07/2019 14:52 by Dr Clayton Samayoa, .    X-rays show a cemented prosthesis with significant deformity of the knee but no new fracture lines are present.  Large Joint Arthrocentesis  Date/Time: 8/28/2019 9:29 AM  Consent given by: patient  Site marked: site marked  Timeout: Immediately prior to procedure a time out was called to verify the correct patient, procedure, equipment, support staff and site/side marked as required   Supporting Documentation  Indications: diagnostic evaluation   Procedure Details  Location: knee -   Preparation: Patient was prepped and draped in the usual sterile fashion  Needle size: 18 G  Approach: anterolateral  Aspirate amount: 8 mL  Aspirate: bloody  Analysis: fluid sample sent for laboratory analysis  Patient tolerance: patient tolerated the procedure well with no immediate complications          ASSESSMENT:    Diagnoses and all orders for this visit:    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 with presence of antibiotic-impregnated cement spacer  -     XR Knee 1 or 2 View Right  -     Sedimentation Rate; Future  -     C-reactive Protein; Future  -     Body Fluid Cell Count With Differential - Body Fluid, Knee, Right; Future  -     Body Fluid Culture - Body Fluid, Knee, Right; Future  -     Body Fluid Cell Count With Differential - Body Fluid, Knee, Right  -     Body fluid cell count - Body Fluid, Knee, Right          PLAN very complex problem this patient has clearly we have discussed once again with he and the family member the issue will be trying to preserve him from having a above-knee amputation.  I have aspirated the knee today to send it off for studies and cultures.  The cultures need to go for 14 days.  I had 8 cc of a bloody fluid from the knee joint.  The center for us C-reactive protein and sed rate.   Last sed rate was 28 C-reactive protein was in the neighborhood of 3.  He will also need clearance from any surgical standpoint because of his brain bleed we may need delayed due to delay his surgery.  I discussed this with him as well we will see him back in 4 weeks to discuss all this further in the meantime he is going to try to obtain clearance.  Will need a prosthesis likely a stemmed even a hinged.  And a goal he will be to get this to heal with the range of motion may be in the neighborhood from 0-90 I would not do anything about the patella.  We have limited goals and clearly the issue is to try to preserve him from having an above-knee amputation on the right.                This document has been electronically signed by Rufino Lu MD on August 28, 2019 9:21 AM

## 2019-08-30 ENCOUNTER — READMISSION MANAGEMENT (OUTPATIENT)
Dept: CALL CENTER | Facility: HOSPITAL | Age: 64
End: 2019-08-30

## 2019-08-30 NOTE — OUTREACH NOTE
Medical Week 3 Survey      Responses   Facility patient discharged from?  [facility]   Does the patient have one of the following disease processes/diagnoses(primary or secondary)?  Other   Week 3 attempt successful?  No   Rescheduled  Revoked          Ramesh Browne RN

## 2019-09-11 LAB
BACTERIA FLD CULT: NORMAL
GRAM STN SPEC: NORMAL
GRAM STN SPEC: NORMAL
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
## 2019-09-23 ENCOUNTER — HOSPITAL ENCOUNTER (OUTPATIENT)
Dept: CT IMAGING | Facility: HOSPITAL | Age: 64
Discharge: HOME | End: 2019-09-23
Admitting: NURSE PRACTITIONER

## 2019-09-23 ENCOUNTER — OFFICE VISIT (OUTPATIENT)
Dept: NEUROSURGERY | Facility: CLINIC | Age: 64
End: 2019-09-23

## 2019-09-23 VITALS — BODY MASS INDEX: 50.65 KG/M2 | HEIGHT: 65 IN | WEIGHT: 304 LBS

## 2019-09-23 DIAGNOSIS — I61.0 NONTRAUMATIC SUBCORTICAL HEMORRHAGE OF RIGHT CEREBRAL HEMISPHERE (HCC): Primary | ICD-10-CM

## 2019-09-23 DIAGNOSIS — E66.01 MORBID OBESITY (HCC): ICD-10-CM

## 2019-09-23 DIAGNOSIS — I61.0 NONTRAUMATIC SUBCORTICAL HEMORRHAGE OF RIGHT CEREBRAL HEMISPHERE (HCC): ICD-10-CM

## 2019-09-23 PROCEDURE — 70450 CT HEAD/BRAIN W/O DYE: CPT

## 2019-09-23 PROCEDURE — 99214 OFFICE O/P EST MOD 30 MIN: CPT | Performed by: NURSE PRACTITIONER

## 2019-09-23 RX ORDER — CYCLOBENZAPRINE HCL 10 MG
10 TABLET ORAL 3 TIMES DAILY PRN
COMMUNITY

## 2019-09-23 NOTE — PATIENT INSTRUCTIONS
"DASH Eating Plan  DASH stands for \"Dietary Approaches to Stop Hypertension.\" The DASH eating plan is a healthy eating plan that has been shown to reduce high blood pressure (hypertension). It may also reduce your risk for type 2 diabetes, heart disease, and stroke. The DASH eating plan may also help with weight loss.  What are tips for following this plan?    General guidelines  · Avoid eating more than 2,300 mg (milligrams) of salt (sodium) a day. If you have hypertension, you may need to reduce your sodium intake to 1,500 mg a day.  · Limit alcohol intake to no more than 1 drink a day for nonpregnant women and 2 drinks a day for men. One drink equals 12 oz of beer, 5 oz of wine, or 1½ oz of hard liquor.  · Work with your health care provider to maintain a healthy body weight or to lose weight. Ask what an ideal weight is for you.  · Get at least 30 minutes of exercise that causes your heart to beat faster (aerobic exercise) most days of the week. Activities may include walking, swimming, or biking.  · Work with your health care provider or diet and nutrition specialist (dietitian) to adjust your eating plan to your individual calorie needs.  Reading food labels    · Check food labels for the amount of sodium per serving. Choose foods with less than 5 percent of the Daily Value of sodium. Generally, foods with less than 300 mg of sodium per serving fit into this eating plan.  · To find whole grains, look for the word \"whole\" as the first word in the ingredient list.  Shopping  · Buy products labeled as \"low-sodium\" or \"no salt added.\"  · Buy fresh foods. Avoid canned foods and premade or frozen meals.  Cooking  · Avoid adding salt when cooking. Use salt-free seasonings or herbs instead of table salt or sea salt. Check with your health care provider or pharmacist before using salt substitutes.  · Do not keita foods. Cook foods using healthy methods such as baking, boiling, grilling, and broiling instead.  · Cook with " heart-healthy oils, such as olive, canola, soybean, or sunflower oil.  Meal planning  · Eat a balanced diet that includes:  ? 5 or more servings of fruits and vegetables each day. At each meal, try to fill half of your plate with fruits and vegetables.  ? Up to 6-8 servings of whole grains each day.  ? Less than 6 oz of lean meat, poultry, or fish each day. A 3-oz serving of meat is about the same size as a deck of cards. One egg equals 1 oz.  ? 2 servings of low-fat dairy each day.  ? A serving of nuts, seeds, or beans 5 times each week.  ? Heart-healthy fats. Healthy fats called Omega-3 fatty acids are found in foods such as flaxseeds and coldwater fish, like sardines, salmon, and mackerel.  · Limit how much you eat of the following:  ? Canned or prepackaged foods.  ? Food that is high in trans fat, such as fried foods.  ? Food that is high in saturated fat, such as fatty meat.  ? Sweets, desserts, sugary drinks, and other foods with added sugar.  ? Full-fat dairy products.  · Do not salt foods before eating.  · Try to eat at least 2 vegetarian meals each week.  · Eat more home-cooked food and less restaurant, buffet, and fast food.  · When eating at a restaurant, ask that your food be prepared with less salt or no salt, if possible.  What foods are recommended?  The items listed may not be a complete list. Talk with your dietitian about what dietary choices are best for you.  Grains  Whole-grain or whole-wheat bread. Whole-grain or whole-wheat pasta. Brown rice. Oatmeal. Quinoa. Bulgur. Whole-grain and low-sodium cereals. Rimma bread. Low-fat, low-sodium crackers. Whole-wheat flour tortillas.  Vegetables  Fresh or frozen vegetables (raw, steamed, roasted, or grilled). Low-sodium or reduced-sodium tomato and vegetable juice. Low-sodium or reduced-sodium tomato sauce and tomato paste. Low-sodium or reduced-sodium canned vegetables.  Fruits  All fresh, dried, or frozen fruit. Canned fruit in natural juice (without  added sugar).  Meat and other protein foods  Skinless chicken or turkey. Ground chicken or turkey. Pork with fat trimmed off. Fish and seafood. Egg whites. Dried beans, peas, or lentils. Unsalted nuts, nut butters, and seeds. Unsalted canned beans. Lean cuts of beef with fat trimmed off. Low-sodium, lean deli meat.  Dairy  Low-fat (1%) or fat-free (skim) milk. Fat-free, low-fat, or reduced-fat cheeses. Nonfat, low-sodium ricotta or cottage cheese. Low-fat or nonfat yogurt. Low-fat, low-sodium cheese.  Fats and oils  Soft margarine without trans fats. Vegetable oil. Low-fat, reduced-fat, or light mayonnaise and salad dressings (reduced-sodium). Canola, safflower, olive, soybean, and sunflower oils. Avocado.  Seasoning and other foods  Herbs. Spices. Seasoning mixes without salt. Unsalted popcorn and pretzels. Fat-free sweets.  What foods are not recommended?  The items listed may not be a complete list. Talk with your dietitian about what dietary choices are best for you.  Grains  Baked goods made with fat, such as croissants, muffins, or some breads. Dry pasta or rice meal packs.  Vegetables  Creamed or fried vegetables. Vegetables in a cheese sauce. Regular canned vegetables (not low-sodium or reduced-sodium). Regular canned tomato sauce and paste (not low-sodium or reduced-sodium). Regular tomato and vegetable juice (not low-sodium or reduced-sodium). Pickles. Olives.  Fruits  Canned fruit in a light or heavy syrup. Fried fruit. Fruit in cream or butter sauce.  Meat and other protein foods  Fatty cuts of meat. Ribs. Fried meat. Millard. Sausage. Bologna and other processed lunch meats. Salami. Fatback. Hotdogs. Bratwurst. Salted nuts and seeds. Canned beans with added salt. Canned or smoked fish. Whole eggs or egg yolks. Chicken or turkey with skin.  Dairy  Whole or 2% milk, cream, and half-and-half. Whole or full-fat cream cheese. Whole-fat or sweetened yogurt. Full-fat cheese. Nondairy creamers. Whipped toppings.  Processed cheese and cheese spreads.  Fats and oils  Butter. Stick margarine. Lard. Shortening. Ghee. Millard fat. Tropical oils, such as coconut, palm kernel, or palm oil.  Seasoning and other foods  Salted popcorn and pretzels. Onion salt, garlic salt, seasoned salt, table salt, and sea salt. Worcestershire sauce. Tartar sauce. Barbecue sauce. Teriyaki sauce. Soy sauce, including reduced-sodium. Steak sauce. Canned and packaged gravies. Fish sauce. Oyster sauce. Cocktail sauce. Horseradish that you find on the shelf. Ketchup. Mustard. Meat flavorings and tenderizers. Bouillon cubes. Hot sauce and Tabasco sauce. Premade or packaged marinades. Premade or packaged taco seasonings. Relishes. Regular salad dressings.  Where to find more information:  · National Heart, Lung, and Blood Orange: www.nhlbi.nih.gov  · American Heart Association: www.heart.org  Summary  · The DASH eating plan is a healthy eating plan that has been shown to reduce high blood pressure (hypertension). It may also reduce your risk for type 2 diabetes, heart disease, and stroke.  · With the DASH eating plan, you should limit salt (sodium) intake to 2,300 mg a day. If you have hypertension, you may need to reduce your sodium intake to 1,500 mg a day.  · When on the DASH eating plan, aim to eat more fresh fruits and vegetables, whole grains, lean proteins, low-fat dairy, and heart-healthy fats.  · Work with your health care provider or diet and nutrition specialist (dietitian) to adjust your eating plan to your individual calorie needs.  This information is not intended to replace advice given to you by your health care provider. Make sure you discuss any questions you have with your health care provider.  Document Released: 12/06/2012 Document Revised: 12/11/2017 Document Reviewed: 12/11/2017  [employer] Interactive Patient Education © 2019 [employer]

## 2019-10-02 ENCOUNTER — OFFICE VISIT (OUTPATIENT)
Dept: ORTHOPEDIC SURGERY | Facility: CLINIC | Age: 64
End: 2019-10-02

## 2019-10-02 VITALS — HEIGHT: 65 IN | BODY MASS INDEX: 50.59 KG/M2

## 2019-10-02 DIAGNOSIS — Z89.529 ACQUIRED ABSENCE OF KNEE JOINT FOLLOWING REMOVAL OF JOINT PROSTHESIS WITH PRESENCE OF ANTIBIOTIC-IMPREGNATED CEMENT SPACER: Primary | ICD-10-CM

## 2019-10-02 DIAGNOSIS — T84.84XA PAINFUL TOTAL KNEE REPLACEMENT, INITIAL ENCOUNTER (HCC): ICD-10-CM

## 2019-10-02 DIAGNOSIS — G89.29 CHRONIC PAIN OF RIGHT KNEE: ICD-10-CM

## 2019-10-02 DIAGNOSIS — M00.9 PYOGENIC ARTHRITIS OF RIGHT KNEE JOINT, DUE TO UNSPECIFIED ORGANISM (HCC): ICD-10-CM

## 2019-10-02 DIAGNOSIS — M25.561 CHRONIC PAIN OF RIGHT KNEE: ICD-10-CM

## 2019-10-02 DIAGNOSIS — Z96.659 PAINFUL TOTAL KNEE REPLACEMENT, INITIAL ENCOUNTER (HCC): ICD-10-CM

## 2019-10-02 PROCEDURE — 99213 OFFICE O/P EST LOW 20 MIN: CPT | Performed by: ORTHOPAEDIC SURGERY

## 2019-10-02 NOTE — PROGRESS NOTES
Michael Sorto is a 64 y.o. male returns for     Chief Complaint   Patient presents with   • Right Knee - Follow-up       HISTORY OF PRESENT ILLNESS:   06/18/19 (106d) Rufino Lu MD     REMOVAL OF TOTAL KNEE COMPONENTS RIGHT KNEE WITH INSERTION OF CEMENT ANTIBIOTIC SPACER - Right        Patient being seen for right knee follow up. Reports pain is 1/10. Doing HEP.  Last time I saw him the cultures were negative at 2 weeks and his blood work looks much better.  He states he got clear from the standpoint of his head.       CONCURRENT MEDICAL HISTORY:    Past Medical History:   Diagnosis Date   • Cellulitis of right lower extremity    • Chronic pain syndrome    • CVA (cerebral vascular accident) (CMS/[facility]) 2011   • Depressive disorder    • GERD (gastroesophageal reflux disease)    • Hypertension    • Injury of lower leg    • Kidney stone    • Morbid obesity (CMS/[facility])    • Painful total knee replacement, initial encounter (CMS/HCC) 3/6/2019   • Pyogenic arthritis of right knee joint (CMS/HCC) 3/6/2019   • Right knee pain        No Known Allergies      Current Outpatient Medications:   •  amLODIPine (NORVASC) 5 MG tablet, Take 1 tablet by mouth Daily., Disp: 30 tablet, Rfl: 0  •  busPIRone (BUSPAR) 5 MG tablet, Take 5 mg by mouth Every 12 (Twelve) Hours., Disp: , Rfl:   •  carvedilol (COREG) 3.125 MG tablet, Take 3.125 mg by mouth Every 12 (Twelve) Hours., Disp: , Rfl:   •  citalopram (CeleXA) 10 MG tablet, Take 10 mg by mouth Daily., Disp: , Rfl:   •  cyclobenzaprine (FLEXERIL) 10 MG tablet, Every 8 (Eight) Hours., Disp: , Rfl:   •  docusate sodium 100 MG capsule, Take 100 mg by mouth 2 (Two) Times a Day As Needed for Constipation., Disp: 60 each, Rfl: 0  •  ondansetron (ZOFRAN) 4 MG tablet, Take 1 tablet by mouth Every 6 (Six) Hours As Needed for Nausea or Vomiting., Disp: 30 tablet, Rfl: 0  •  terazosin (HYTRIN) 5 MG capsule, Take 1 capsule by mouth Daily., Disp: 30 capsule, Rfl: 0  •  traMADol (ULTRAM) 50 MG  "tablet, Take 50 mg by mouth Every 6 (Six) Hours As Needed for Moderate Pain ., Disp: , Rfl:     Past Surgical History:   Procedure Laterality Date   • AMPUTATION Left 2010   • AORTIC VALVE REPAIR/REPLACEMENT  2011   • APPENDECTOMY     • CARDIAC CATHETERIZATION     • REPLACEMENT TOTAL KNEE Right 2011   • TONSILLECTOMY     • TOTAL KNEE  PROSTHESIS REMOVAL W/ SPACER INSERTION Right 6/18/2019    Procedure: REMOVAL OF TOTAL KNEE COMPONENTS RIGHT KNEE WITH INSERTION OF CEMENT ANTIBIOTIC SPACER;  Surgeon: Rufino Lu MD;  Location: [facility];  Service: Orthopedics           ROS: Review of systems has been updated as of today's date.  All other systems are negative except as noted previously.    PHYSICAL EXAMINATION:       Ht 165.1 cm (65\")   BMI 50.59 kg/m²     Physical Exam   Constitutional: He is oriented to person, place, and time. He appears well-developed.   HENT:   Head: Normocephalic and atraumatic.   Eyes: EOM are normal. Pupils are equal, round, and reactive to light.   Neck: Neck supple. No tracheal deviation present.   Pulmonary/Chest: Effort normal.   Musculoskeletal: He exhibits edema, tenderness and deformity.   Neurological: He is alert and oriented to person, place, and time.   Skin: Skin is warm and dry. No erythema.   Psychiatric: He has a normal mood and affect.       GAIT:     []  Normal  []  Antalgic    Assistive device: []  None  []  Walker     []  Crutches  []  Cane     [x]  Wheelchair  []  Stretcher    Ortho Exam  Toggle motion for about 90 to 30 degrees.  Wounds are okay there is no drainage.  Appears neurovascular intact distally.      2Views right knee     Comparison prior film     Patient has cement spacers and a markedly deformed knee with compression of the tibial plateau no acute fractures are seen evidence of all prosthetic is there.  Significant deformities noted on both AP and lateral view there appears it may be a fracture of the cement spacer on the femur.  Overall alignment " is reasonable well-maintained.  No new lytic areas are noted.  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  Certainly the risks of this are amputation recurrent infection bleeding, blood clot, loss of motion, failure to resolve his pain, fracture, and ultimately lead to amputation and even death with his medical issues.  I gone over detailed informed consent with him in detail today as I have in the past.  I think the other thing here is we need to think about he had difficult time placing him before be nice if we get the bowel and get that streamlined a little bit while going to the ball rolling to get him scheduled.    Patient's Body mass index is 50.59 kg/m². BMI is above normal parameters. Recommendations include: exercise counseling and nutrition counseling.      No Follow-up on file.      This document has been electronically signed by Rufino Lu MD on October 2, 2019 11:26 AM

## 2019-10-17 ENCOUNTER — TELEPHONE (OUTPATIENT)
Dept: ORTHOPEDIC SURGERY | Facility: CLINIC | Age: 64
End: 2019-10-17

## 2019-10-17 PROBLEM — Z96.659 PAINFUL TOTAL KNEE REPLACEMENT, INITIAL ENCOUNTER (HCC): Status: ACTIVE | Noted: 2019-10-17

## 2019-10-17 PROBLEM — M00.9 PYOGENIC ARTHRITIS OF RIGHT KNEE JOINT (HCC): Status: ACTIVE | Noted: 2019-10-17

## 2019-10-17 PROBLEM — T84.84XA PAINFUL TOTAL KNEE REPLACEMENT, INITIAL ENCOUNTER (HCC): Status: ACTIVE | Noted: 2019-10-17

## 2019-11-01 ENCOUNTER — APPOINTMENT (OUTPATIENT)
Dept: PREADMISSION TESTING | Facility: HOSPITAL | Age: 64
End: 2019-11-01

## 2019-11-01 VITALS
SYSTOLIC BLOOD PRESSURE: 157 MMHG | DIASTOLIC BLOOD PRESSURE: 97 MMHG | BODY MASS INDEX: 49.15 KG/M2 | WEIGHT: 295 LBS | HEART RATE: 65 BPM | RESPIRATION RATE: 12 BRPM | OXYGEN SATURATION: 99 % | HEIGHT: 65 IN

## 2019-11-01 LAB
ABO GROUP BLD: NORMAL
BASOPHILS # BLD AUTO: 0.01 10*3/MM3 (ref 0–0.2)
BASOPHILS NFR BLD AUTO: 0.2 % (ref 0–1.5)
BLD GP AB SCN SERPL QL: NEGATIVE
DEPRECATED RDW RBC AUTO: 44.5 FL (ref 37–54)
EOSINOPHIL # BLD AUTO: 0.07 10*3/MM3 (ref 0–0.4)
EOSINOPHIL NFR BLD AUTO: 1.2 % (ref 0.3–6.2)
ERYTHROCYTE [DISTWIDTH] IN BLOOD BY AUTOMATED COUNT: 14.2 % (ref 12.3–15.4)
HCT VFR BLD AUTO: 47.9 % (ref 37.5–51)
HGB BLD-MCNC: 16.4 G/DL (ref 13–17.7)
IMM GRANULOCYTES # BLD AUTO: 0.02 10*3/MM3 (ref 0–0.05)
IMM GRANULOCYTES NFR BLD AUTO: 0.3 % (ref 0–0.5)
LYMPHOCYTES # BLD AUTO: 1.77 10*3/MM3 (ref 0.7–3.1)
LYMPHOCYTES NFR BLD AUTO: 30.8 % (ref 19.6–45.3)
Lab: NORMAL
MCH RBC QN AUTO: 29.5 PG (ref 26.6–33)
MCHC RBC AUTO-ENTMCNC: 34.2 G/DL (ref 31.5–35.7)
MCV RBC AUTO: 86.3 FL (ref 79–97)
MONOCYTES # BLD AUTO: 0.53 10*3/MM3 (ref 0.1–0.9)
MONOCYTES NFR BLD AUTO: 9.2 % (ref 5–12)
MRSA DNA SPEC QL NAA+PROBE: NEGATIVE
NEUTROPHILS # BLD AUTO: 3.35 10*3/MM3 (ref 1.7–7)
NEUTROPHILS NFR BLD AUTO: 58.3 % (ref 42.7–76)
NRBC BLD AUTO-RTO: 0 /100 WBC (ref 0–0.2)
PLATELET # BLD AUTO: 113 10*3/MM3 (ref 140–450)
PMV BLD AUTO: 10.7 FL (ref 6–12)
RBC # BLD AUTO: 5.55 10*6/MM3 (ref 4.14–5.8)
RH BLD: POSITIVE
T&S EXPIRATION DATE: NORMAL
WBC NRBC COR # BLD: 5.75 10*3/MM3 (ref 3.4–10.8)

## 2019-11-01 PROCEDURE — 86850 RBC ANTIBODY SCREEN: CPT | Performed by: ANESTHESIOLOGY

## 2019-11-01 PROCEDURE — 86901 BLOOD TYPING SEROLOGIC RH(D): CPT | Performed by: ANESTHESIOLOGY

## 2019-11-01 PROCEDURE — 87641 MR-STAPH DNA AMP PROBE: CPT | Performed by: ORTHOPAEDIC SURGERY

## 2019-11-01 PROCEDURE — 85025 COMPLETE CBC W/AUTO DIFF WBC: CPT | Performed by: ANESTHESIOLOGY

## 2019-11-01 PROCEDURE — 86900 BLOOD TYPING SEROLOGIC ABO: CPT | Performed by: ANESTHESIOLOGY

## 2019-11-01 PROCEDURE — 36415 COLL VENOUS BLD VENIPUNCTURE: CPT

## 2019-11-01 RX ORDER — CARVEDILOL 3.12 MG/1
3.12 TABLET ORAL 2 TIMES DAILY WITH MEALS
COMMUNITY

## 2019-11-01 RX ORDER — SODIUM CHLORIDE, SODIUM GLUCONATE, SODIUM ACETATE, POTASSIUM CHLORIDE, AND MAGNESIUM CHLORIDE 526; 502; 368; 37; 30 MG/100ML; MG/100ML; MG/100ML; MG/100ML; MG/100ML
1000 INJECTION, SOLUTION INTRAVENOUS CONTINUOUS
Status: CANCELLED | OUTPATIENT
Start: 2019-11-07

## 2019-11-01 RX ORDER — BUSPIRONE HYDROCHLORIDE 5 MG/1
5 TABLET ORAL 2 TIMES DAILY
COMMUNITY

## 2019-11-01 RX ORDER — CITALOPRAM 10 MG/1
10 TABLET ORAL DAILY
COMMUNITY
End: 2019-12-20

## 2019-11-01 NOTE — DISCHARGE INSTRUCTIONS
[facility]  Pre-op Information and Guidelines    You will be called after 2 p.m. the day before your surgery (Friday for Monday surgery) and notified of your time for arrival and approximate surgery time.  If you have not received a call by 4P.M., please contact Same Day Surgery at [phone] of if outside [facility] call [phone].    Please Follow these Important Safety Guidelines:    • The morning of your procedure, take only the medications listed below with   A sip of water:_____________________________________________       ______________________________________________    • DO NOT eat or drink anything after 12:00 midnight the night before surgery  Specific instructions concerning drinking clear liquids will be discussed during  the pre-surgery instruction call the day before your surgery.    • If you take a blood thinner (ex. Plavix, Coumadin, aspirin), ask your doctor when to stop it before surgery  STOP DATE: _________________    • Only 2 visitors are allowed in patient rooms at a time  Your visitors will be asked to wait in the lobby until the admission process is complete with the exception of a parent with a child and patients in need of special assistance.    • YOU CANNOT DRIVE YOURSELF HOME  You must be accompanied by someone who will be responsible for driving you home after surgery and for your care at home.    • DO NOT chew gum, use breath mints, hard candy, or smoke the day of surgery  • DO NOT drink alcohol for at least 24 hours before your surgery  • DO NOT wear any jewelry and remove all body piercing before coming to the hospital  • DO NOT wear make-up to the hospital  • If you are having surgery on an extremity (arm/leg/foot) remove nail polish/artificial nails on the surgical side  • Clothing, glasses, contacts, dentures, and hairpieces must be removed before surgery  • Bathe the night before or the morning of your surgery and do not use powders/lotions on  skin.

## 2019-11-06 ENCOUNTER — ANESTHESIA EVENT (OUTPATIENT)
Dept: PERIOP | Facility: HOSPITAL | Age: 64
End: 2019-11-06

## 2019-11-07 ENCOUNTER — ANESTHESIA (OUTPATIENT)
Dept: PERIOP | Facility: HOSPITAL | Age: 64
End: 2019-11-07

## 2019-11-07 ENCOUNTER — HOSPITAL ENCOUNTER (OUTPATIENT)
Facility: HOSPITAL | Age: 64
Discharge: HOME | End: 2019-11-07
Attending: ORTHOPAEDIC SURGERY | Admitting: ORTHOPAEDIC SURGERY

## 2019-11-07 VITALS
SYSTOLIC BLOOD PRESSURE: 190 MMHG | HEART RATE: 79 BPM | TEMPERATURE: 96.3 F | WEIGHT: 293.87 LBS | OXYGEN SATURATION: 99 % | HEIGHT: 65 IN | RESPIRATION RATE: 21 BRPM | BODY MASS INDEX: 48.96 KG/M2 | DIASTOLIC BLOOD PRESSURE: 108 MMHG

## 2019-11-07 PROCEDURE — G0463 HOSPITAL OUTPT CLINIC VISIT: HCPCS | Performed by: ORTHOPAEDIC SURGERY

## 2019-11-07 RX ORDER — SODIUM CHLORIDE, SODIUM GLUCONATE, SODIUM ACETATE, POTASSIUM CHLORIDE, AND MAGNESIUM CHLORIDE 526; 502; 368; 37; 30 MG/100ML; MG/100ML; MG/100ML; MG/100ML; MG/100ML
1000 INJECTION, SOLUTION INTRAVENOUS CONTINUOUS
Status: DISCONTINUED | OUTPATIENT
Start: 2019-11-07 | End: 2019-11-07 | Stop reason: HOSPADM

## 2019-11-07 NOTE — NURSING NOTE
Dr. Long notified of blood pressure.  Dr. Long spoke with patient and Dr. Lu.  Procedure cancelled and patient instructed to see family MD and have blood pressure monitored and under control.  Call Dr. Lu's office to reschedule procedure.

## 2019-12-09 ENCOUNTER — OFFICE VISIT (OUTPATIENT)
Dept: ORTHOPEDIC SURGERY | Facility: CLINIC | Age: 64
End: 2019-12-09

## 2019-12-09 VITALS — BODY MASS INDEX: 48.82 KG/M2 | WEIGHT: 293 LBS | HEIGHT: 65 IN

## 2019-12-09 DIAGNOSIS — T84.84XA PAINFUL TOTAL KNEE REPLACEMENT, INITIAL ENCOUNTER (HCC): ICD-10-CM

## 2019-12-09 DIAGNOSIS — M00.9 PYOGENIC ARTHRITIS OF RIGHT KNEE JOINT, DUE TO UNSPECIFIED ORGANISM (HCC): ICD-10-CM

## 2019-12-09 DIAGNOSIS — G89.29 CHRONIC PAIN OF RIGHT KNEE: Primary | ICD-10-CM

## 2019-12-09 DIAGNOSIS — Z96.659 PAINFUL TOTAL KNEE REPLACEMENT, INITIAL ENCOUNTER (HCC): ICD-10-CM

## 2019-12-09 DIAGNOSIS — M25.561 CHRONIC PAIN OF RIGHT KNEE: Primary | ICD-10-CM

## 2019-12-09 PROCEDURE — 99214 OFFICE O/P EST MOD 30 MIN: CPT | Performed by: ORTHOPAEDIC SURGERY

## 2019-12-09 NOTE — PROGRESS NOTES
Michael Sorto is a 64 y.o. male returns for     Chief Complaint   Patient presents with   • Right Knee - Follow-up       HISTORY OF PRESENT ILLNESS: f/u right knee. Patient in today to get scheduled for surgery.  He tells me is been seeing his primary care doctor is cleared him for surgery.  He tells me his blood pressure now runs about 140/88.  He had been previously cleared by the neuro people for his hemorrhagic stroke.       CONCURRENT MEDICAL HISTORY:    Past Medical History:   Diagnosis Date   • Cellulitis of right lower extremity    • Chronic pain syndrome    • CVA (cerebral vascular accident) (CMS/[facility]) 2011   • Depressive disorder    • GERD (gastroesophageal reflux disease)    • Hypertension    • Injury of lower leg    • Kidney stone    • Morbid obesity (CMS/[facility])    • Painful total knee replacement, initial encounter (CMS/[facility]) 3/6/2019   • Pyogenic arthritis of right knee joint (CMS/[facility]) 3/6/2019   • Right knee pain        No Known Allergies      Current Outpatient Medications:   •  amLODIPine (NORVASC) 5 MG tablet, Take 1 tablet by mouth Daily., Disp: 30 tablet, Rfl: 0  •  busPIRone (BUSPAR) 5 MG tablet, Take 5 mg by mouth 2 (Two) Times a Day., Disp: , Rfl:   •  carvedilol (COREG) 3.125 MG tablet, Take 3.125 mg by mouth 2 (Two) Times a Day With Meals., Disp: , Rfl:   •  citalopram (CeleXA) 10 MG tablet, Take 10 mg by mouth Daily., Disp: , Rfl:   •  cyclobenzaprine (FLEXERIL) 10 MG tablet, Take 10 mg by mouth 3 (Three) Times a Day As Needed., Disp: , Rfl:   •  docusate sodium 100 MG capsule, Take 100 mg by mouth 2 (Two) Times a Day As Needed for Constipation., Disp: 60 each, Rfl: 0  •  ondansetron (ZOFRAN) 4 MG tablet, Take 1 tablet by mouth Every 6 (Six) Hours As Needed for Nausea or Vomiting., Disp: 30 tablet, Rfl: 0  •  traMADol (ULTRAM) 50 MG tablet, Take 50 mg by mouth Every 6 (Six) Hours As Needed for Moderate Pain ., Disp: , Rfl:     Past Surgical History:   Procedure Laterality Date   • AMPUTATION  "Left     BKA   • AORTIC VALVE REPAIR/REPLACEMENT     • APPENDECTOMY     • CARDIAC CATHETERIZATION     • REPLACEMENT TOTAL KNEE Right    • TONSILLECTOMY     • TOTAL KNEE  PROSTHESIS REMOVAL W/ SPACER INSERTION Right 2019    Procedure: REMOVAL OF TOTAL KNEE COMPONENTS RIGHT KNEE WITH INSERTION OF CEMENT ANTIBIOTIC SPACER;  Surgeon: Rufino Lu MD;  Location: [facility];  Service: Orthopedics           ROS: Review of systems has been updated as of today's date.  All other systems are negative except as noted previously.    PHYSICAL EXAMINATION:       Ht 165.1 cm (65\")   Wt 133 kg (293 lb)   BMI 48.76 kg/m²     Physical Exam   Constitutional: He is oriented to person, place, and time. He appears well-developed.   HENT:   Head: Normocephalic and atraumatic.   Eyes: Pupils are equal, round, and reactive to light. EOM are normal.   Neck: Neck supple. No tracheal deviation present.   Pulmonary/Chest: Effort normal.   Musculoskeletal: He exhibits edema, tenderness and deformity.   Neurological: He is alert and oriented to person, place, and time.   Skin: Skin is warm and dry. No erythema.   Psychiatric: He has a normal mood and affect.       GAIT:     []  Normal  []  Antalgic    Assistive device: []  None  []  Walker     []  Crutches  []  Cane     [x]  Wheelchair  []  Stretcher    Ortho Exam  Wounds look good.  Lacks full extension.  Moves his toes distally.    No results found.          ASSESSMENT:    Diagnoses and all orders for this visit:    Chronic pain of right knee    Pyogenic arthritis of right knee joint, due to unspecified organism (CMS/[facility])    Painful total knee replacement, initial encounter (CMS/[facility])          PLAN once again we will schedule for revision surgery with a hinge will notify the vendors.  I have again told him specifically were trying to avoid above-the-knee amputation here.  He tells me is going to be going to detention in February we can do physical therapy.  He  to get " this done sometime before that.  I want the wound well-healed before he goes to jail it will need to be done in by early January.  All the risk and benefits explained to him again in detail including but not limited to bleeding, blood clot, infection, need for further surgery, failure to resolve his pain, need for amputation, medical anesthetic complications, including stroke, heart attack even death.  He understands wants to proceed as planned.    No follow-ups on file.      This document has been electronically signed by Tomeka Zamora MA on December 9, 2019 9:06 AM

## 2019-12-20 ENCOUNTER — APPOINTMENT (OUTPATIENT)
Dept: PREADMISSION TESTING | Facility: HOSPITAL | Age: 64
End: 2019-12-20

## 2019-12-20 VITALS
WEIGHT: 300 LBS | DIASTOLIC BLOOD PRESSURE: 78 MMHG | OXYGEN SATURATION: 96 % | SYSTOLIC BLOOD PRESSURE: 130 MMHG | HEART RATE: 61 BPM | BODY MASS INDEX: 49.98 KG/M2 | RESPIRATION RATE: 18 BRPM | HEIGHT: 65 IN

## 2019-12-20 LAB
DEPRECATED RDW RBC AUTO: 46.9 FL (ref 37–54)
ERYTHROCYTE [DISTWIDTH] IN BLOOD BY AUTOMATED COUNT: 14.4 % (ref 12.3–15.4)
HCT VFR BLD AUTO: 45.4 % (ref 37.5–51)
HGB BLD-MCNC: 15.4 G/DL (ref 13–17.7)
MCH RBC QN AUTO: 30.6 PG (ref 26.6–33)
MCHC RBC AUTO-ENTMCNC: 33.9 G/DL (ref 31.5–35.7)
MCV RBC AUTO: 90.1 FL (ref 79–97)
MRSA DNA SPEC QL NAA+PROBE: NEGATIVE
PLATELET # BLD AUTO: 117 10*3/MM3 (ref 140–450)
PMV BLD AUTO: 9.5 FL (ref 6–12)
RBC # BLD AUTO: 5.04 10*6/MM3 (ref 4.14–5.8)
WBC NRBC COR # BLD: 5.3 10*3/MM3 (ref 3.4–10.8)

## 2019-12-20 PROCEDURE — 85027 COMPLETE CBC AUTOMATED: CPT | Performed by: ANESTHESIOLOGY

## 2019-12-20 PROCEDURE — 36415 COLL VENOUS BLD VENIPUNCTURE: CPT

## 2019-12-20 PROCEDURE — 87641 MR-STAPH DNA AMP PROBE: CPT | Performed by: ORTHOPAEDIC SURGERY

## 2019-12-20 RX ORDER — DICLOFENAC SODIUM 75 MG/1
75 TABLET, DELAYED RELEASE ORAL 2 TIMES DAILY
COMMUNITY

## 2019-12-20 RX ORDER — AMLODIPINE BESYLATE 5 MG/1
5 TABLET ORAL DAILY
COMMUNITY

## 2019-12-20 RX ORDER — TERAZOSIN 5 MG/1
5 CAPSULE ORAL NIGHTLY
COMMUNITY

## 2019-12-20 RX ORDER — SODIUM CHLORIDE, SODIUM GLUCONATE, SODIUM ACETATE, POTASSIUM CHLORIDE, AND MAGNESIUM CHLORIDE 526; 502; 368; 37; 30 MG/100ML; MG/100ML; MG/100ML; MG/100ML; MG/100ML
1000 INJECTION, SOLUTION INTRAVENOUS CONTINUOUS
Status: CANCELLED | OUTPATIENT
Start: 2019-12-26

## 2019-12-20 RX ORDER — LISINOPRIL 10 MG/1
10 TABLET ORAL DAILY
COMMUNITY

## 2019-12-20 RX ORDER — ASPIRIN 325 MG
325 TABLET ORAL 2 TIMES DAILY
COMMUNITY

## 2019-12-20 NOTE — DISCHARGE INSTRUCTIONS
[facility]  Pre-op Information and Guidelines    You will be called after 2 p.m. the day before your surgery (Friday for Monday surgery) and notified of your time for arrival and approximate surgery time.  If you have not received a call by 4P.M., please contact Same Day Surgery at [phone] of if outside [facility] call [phone].    Please Follow these Important Safety Guidelines:    • The morning of your procedure, take only the medications listed below with   A sip of water:_____________________________________________       ______________________________________________    • DO NOT eat or drink anything after 12:00 midnight the night before surgery  Specific instructions concerning drinking clear liquids will be discussed during  the pre-surgery instruction call the day before your surgery.    • If you take a blood thinner (ex. Plavix, Coumadin, aspirin), ask your doctor when to stop it before surgery  STOP DATE: _________________    • Only 2 visitors are allowed in patient rooms at a time  Your visitors will be asked to wait in the lobby until the admission process is complete with the exception of a parent with a child and patients in need of special assistance.    • YOU CANNOT DRIVE YOURSELF HOME  You must be accompanied by someone who will be responsible for driving you home after surgery and for your care at home.    • DO NOT chew gum, use breath mints, hard candy, or smoke the day of surgery  • DO NOT drink alcohol for at least 24 hours before your surgery  • DO NOT wear any jewelry and remove all body piercing before coming to the hospital  • DO NOT wear make-up to the hospital  • If you are having surgery on an extremity (arm/leg/foot) remove nail polish/artificial nails on the surgical side  • Clothing, glasses, contacts, dentures, and hairpieces must be removed before surgery  • Bathe the night before or the morning of your surgery and do not use powders/lotions on  skin.

## 2019-12-20 NOTE — PAT
Chlorhexidine scrub given with instruction sheet.  Instruction reviewed in PAT, understanding verbalized.    Pt states surgeon instructed him not to stop ASA.  States he put him on it for blood clot prevention.

## 2019-12-25 ENCOUNTER — ANESTHESIA EVENT (OUTPATIENT)
Dept: PERIOP | Facility: HOSPITAL | Age: 64
End: 2019-12-25

## 2019-12-26 ENCOUNTER — HOSPITAL ENCOUNTER (INPATIENT)
Facility: HOSPITAL | Age: 64
LOS: 9 days | Discharge: HOME | End: 2020-01-04
Attending: ORTHOPAEDIC SURGERY | Admitting: ORTHOPAEDIC SURGERY

## 2019-12-26 ENCOUNTER — ANESTHESIA (OUTPATIENT)
Dept: PERIOP | Facility: HOSPITAL | Age: 64
End: 2019-12-26

## 2019-12-26 ENCOUNTER — APPOINTMENT (OUTPATIENT)
Dept: GENERAL RADIOLOGY | Facility: HOSPITAL | Age: 64
End: 2019-12-26

## 2019-12-26 DIAGNOSIS — T84.84XA PAINFUL TOTAL KNEE REPLACEMENT, INITIAL ENCOUNTER (HCC): ICD-10-CM

## 2019-12-26 DIAGNOSIS — M00.9 PYOGENIC ARTHRITIS OF RIGHT KNEE JOINT, DUE TO UNSPECIFIED ORGANISM (HCC): ICD-10-CM

## 2019-12-26 DIAGNOSIS — Z74.09 IMPAIRED FUNCTIONAL MOBILITY, BALANCE, GAIT, AND ENDURANCE: ICD-10-CM

## 2019-12-26 DIAGNOSIS — Z78.9 IMPAIRED MOBILITY AND ADLS: ICD-10-CM

## 2019-12-26 DIAGNOSIS — Z96.659 PAINFUL TOTAL KNEE REPLACEMENT, INITIAL ENCOUNTER (HCC): ICD-10-CM

## 2019-12-26 DIAGNOSIS — Z74.09 IMPAIRED MOBILITY AND ADLS: ICD-10-CM

## 2019-12-26 LAB
ABO GROUP BLD: NORMAL
ANION GAP SERPL CALCULATED.3IONS-SCNC: 13 MMOL/L (ref 5–15)
BLD GP AB SCN SERPL QL: NEGATIVE
BUN BLD-MCNC: 17 MG/DL (ref 8–23)
BUN/CREAT SERPL: 17 (ref 7–25)
CALCIUM SPEC-SCNC: 8.1 MG/DL (ref 8.6–10.5)
CHLORIDE SERPL-SCNC: 106 MMOL/L (ref 98–107)
CO2 SERPL-SCNC: 21 MMOL/L (ref 22–29)
CREAT BLD-MCNC: 1 MG/DL (ref 0.76–1.27)
GFR SERPL CREATININE-BSD FRML MDRD: 75 ML/MIN/1.73
GLUCOSE BLD-MCNC: 169 MG/DL (ref 65–99)
Lab: NORMAL
POTASSIUM BLD-SCNC: 4.5 MMOL/L (ref 3.5–5.2)
RH BLD: POSITIVE
SODIUM BLD-SCNC: 140 MMOL/L (ref 136–145)
T&S EXPIRATION DATE: NORMAL

## 2019-12-26 PROCEDURE — 88331 PATH CONSLTJ SURG 1 BLK 1SPC: CPT | Performed by: PATHOLOGY

## 2019-12-26 PROCEDURE — 88331 PATH CONSLTJ SURG 1 BLK 1SPC: CPT | Performed by: ORTHOPAEDIC SURGERY

## 2019-12-26 PROCEDURE — 87205 SMEAR GRAM STAIN: CPT | Performed by: ORTHOPAEDIC SURGERY

## 2019-12-26 PROCEDURE — C1776 JOINT DEVICE (IMPLANTABLE): HCPCS | Performed by: ORTHOPAEDIC SURGERY

## 2019-12-26 PROCEDURE — 25010000002 NEOSTIGMINE 4 MG/4ML SOLUTION PREFILLED SYRINGE: Performed by: NURSE ANESTHETIST, CERTIFIED REGISTERED

## 2019-12-26 PROCEDURE — C1713 ANCHOR/SCREW BN/BN,TIS/BN: HCPCS | Performed by: ORTHOPAEDIC SURGERY

## 2019-12-26 PROCEDURE — 0SPC0JZ REMOVAL OF SYNTHETIC SUBSTITUTE FROM RIGHT KNEE JOINT, OPEN APPROACH: ICD-10-PCS | Performed by: ORTHOPAEDIC SURGERY

## 2019-12-26 PROCEDURE — 87070 CULTURE OTHR SPECIMN AEROBIC: CPT | Performed by: ORTHOPAEDIC SURGERY

## 2019-12-26 PROCEDURE — 94760 N-INVAS EAR/PLS OXIMETRY 1: CPT

## 2019-12-26 PROCEDURE — 25010000002 PROPOFOL 10 MG/ML EMULSION: Performed by: NURSE ANESTHETIST, CERTIFIED REGISTERED

## 2019-12-26 PROCEDURE — 97162 PT EVAL MOD COMPLEX 30 MIN: CPT

## 2019-12-26 PROCEDURE — 87075 CULTR BACTERIA EXCEPT BLOOD: CPT | Performed by: ORTHOPAEDIC SURGERY

## 2019-12-26 PROCEDURE — 87176 TISSUE HOMOGENIZATION CULTR: CPT | Performed by: ORTHOPAEDIC SURGERY

## 2019-12-26 PROCEDURE — 25010000002 HYDROMORPHONE 1 MG/ML SOLUTION: Performed by: NURSE ANESTHETIST, CERTIFIED REGISTERED

## 2019-12-26 PROCEDURE — 94799 UNLISTED PULMONARY SVC/PX: CPT

## 2019-12-26 PROCEDURE — 88305 TISSUE EXAM BY PATHOLOGIST: CPT | Performed by: PATHOLOGY

## 2019-12-26 PROCEDURE — 0SRC0J9 REPLACEMENT OF RIGHT KNEE JOINT WITH SYNTHETIC SUBSTITUTE, CEMENTED, OPEN APPROACH: ICD-10-PCS | Performed by: ORTHOPAEDIC SURGERY

## 2019-12-26 PROCEDURE — L1830 KO IMMOB CANVAS LONG PRE OTS: HCPCS | Performed by: ORTHOPAEDIC SURGERY

## 2019-12-26 PROCEDURE — 86901 BLOOD TYPING SEROLOGIC RH(D): CPT | Performed by: ANESTHESIOLOGY

## 2019-12-26 PROCEDURE — 86850 RBC ANTIBODY SCREEN: CPT | Performed by: ANESTHESIOLOGY

## 2019-12-26 PROCEDURE — 86900 BLOOD TYPING SEROLOGIC ABO: CPT | Performed by: ANESTHESIOLOGY

## 2019-12-26 PROCEDURE — 87015 SPECIMEN INFECT AGNT CONCNTJ: CPT | Performed by: ORTHOPAEDIC SURGERY

## 2019-12-26 PROCEDURE — 25010000002 SUCCINYLCHOLINE PER 20 MG: Performed by: NURSE ANESTHETIST, CERTIFIED REGISTERED

## 2019-12-26 PROCEDURE — 25010000002 PHENYLEPHRINE PER 1 ML: Performed by: NURSE ANESTHETIST, CERTIFIED REGISTERED

## 2019-12-26 PROCEDURE — 73560 X-RAY EXAM OF KNEE 1 OR 2: CPT

## 2019-12-26 PROCEDURE — 88305 TISSUE EXAM BY PATHOLOGIST: CPT | Performed by: ORTHOPAEDIC SURGERY

## 2019-12-26 PROCEDURE — 80048 BASIC METABOLIC PNL TOTAL CA: CPT | Performed by: ORTHOPAEDIC SURGERY

## 2019-12-26 PROCEDURE — 0QBD0ZZ EXCISION OF RIGHT PATELLA, OPEN APPROACH: ICD-10-PCS | Performed by: ORTHOPAEDIC SURGERY

## 2019-12-26 PROCEDURE — 25010000002 HYDROMORPHONE 1 MG/ML SOLUTION: Performed by: ORTHOPAEDIC SURGERY

## 2019-12-26 PROCEDURE — 27487 REVISE/REPLACE KNEE JOINT: CPT | Performed by: ORTHOPAEDIC SURGERY

## 2019-12-26 PROCEDURE — 25010000002 MIDAZOLAM PER 1 MG: Performed by: NURSE ANESTHETIST, CERTIFIED REGISTERED

## 2019-12-26 PROCEDURE — 25010000002 ONDANSETRON PER 1 MG: Performed by: NURSE ANESTHETIST, CERTIFIED REGISTERED

## 2019-12-26 PROCEDURE — 25010000002 FENTANYL CITRATE (PF) 100 MCG/2ML SOLUTION: Performed by: NURSE ANESTHETIST, CERTIFIED REGISTERED

## 2019-12-26 PROCEDURE — 25010000002 VANCOMYCIN 5 G RECONSTITUTED SOLUTION: Performed by: ORTHOPAEDIC SURGERY

## 2019-12-26 PROCEDURE — 25010000002 HYDROMORPHONE PER 4 MG: Performed by: NURSE ANESTHETIST, CERTIFIED REGISTERED

## 2019-12-26 DEVICE — SMARTSET GMV HIGH PERFORMANCE GENTAMICIN MEDIUM VISCOSITY BONE CEMENT 40G
Type: IMPLANTABLE DEVICE | Site: KNEE | Status: FUNCTIONAL
Brand: SMARTSET

## 2019-12-26 DEVICE — S-ROM NOILES ROTATING HINGE FEMORAL ROTATING HINGE CEMENTED RIGHT SMALL
Type: IMPLANTABLE DEVICE | Site: KNEE | Status: FUNCTIONAL
Brand: S-ROM NOILES

## 2019-12-26 DEVICE — UNIVERSAL STEM FLUTED 75MM X 12MM: Type: IMPLANTABLE DEVICE | Site: KNEE | Status: FUNCTIONAL

## 2019-12-26 DEVICE — SMARTSET HV HIGH VISCOSITY BONE CEMENT 40G
Type: IMPLANTABLE DEVICE | Site: KNEE | Status: FUNCTIONAL
Brand: SMARTSET

## 2019-12-26 DEVICE — UNIVERSAL FEMORAL SLEEVE FULL POROUS 31MM: Type: IMPLANTABLE DEVICE | Site: KNEE | Status: FUNCTIONAL

## 2019-12-26 DEVICE — UNIVERSAL STEM FLUTED 75MM X 14MM: Type: IMPLANTABLE DEVICE | Site: KNEE | Status: FUNCTIONAL

## 2019-12-26 DEVICE — TIBIAL TRAY ROTATING PLATFORM M.B.T. REVISION SIZE 2 CEMENTED: Type: IMPLANTABLE DEVICE | Site: KNEE | Status: FUNCTIONAL

## 2019-12-26 DEVICE — M.B.T. REVISION METAPHYSEAL SLEEVE POROUS 29MM: Type: IMPLANTABLE DEVICE | Site: KNEE | Status: FUNCTIONAL

## 2019-12-26 DEVICE — LPS TIBIAL INSERT HINGE UNIVERSAL SMALL 12MM
Type: IMPLANTABLE DEVICE | Site: KNEE | Status: FUNCTIONAL
Brand: LPS

## 2019-12-26 RX ORDER — SODIUM CHLORIDE 0.9 % (FLUSH) 0.9 %
3 SYRINGE (ML) INJECTION EVERY 12 HOURS SCHEDULED
Status: DISCONTINUED | OUTPATIENT
Start: 2019-12-26 | End: 2020-01-04 | Stop reason: HOSPADM

## 2019-12-26 RX ORDER — ONDANSETRON 2 MG/ML
INJECTION INTRAMUSCULAR; INTRAVENOUS AS NEEDED
Status: DISCONTINUED | OUTPATIENT
Start: 2019-12-26 | End: 2019-12-26 | Stop reason: SURG

## 2019-12-26 RX ORDER — ONDANSETRON 2 MG/ML
4 INJECTION INTRAMUSCULAR; INTRAVENOUS EVERY 6 HOURS PRN
Status: DISCONTINUED | OUTPATIENT
Start: 2019-12-26 | End: 2020-01-04 | Stop reason: HOSPADM

## 2019-12-26 RX ORDER — ONDANSETRON 2 MG/ML
4 INJECTION INTRAMUSCULAR; INTRAVENOUS ONCE AS NEEDED
Status: DISCONTINUED | OUTPATIENT
Start: 2019-12-26 | End: 2019-12-26 | Stop reason: HOSPADM

## 2019-12-26 RX ORDER — AMLODIPINE BESYLATE 5 MG/1
5 TABLET ORAL DAILY
Status: DISCONTINUED | OUTPATIENT
Start: 2019-12-27 | End: 2019-12-29

## 2019-12-26 RX ORDER — SODIUM CHLORIDE 9 MG/ML
75 INJECTION, SOLUTION INTRAVENOUS CONTINUOUS
Status: DISCONTINUED | OUTPATIENT
Start: 2019-12-26 | End: 2019-12-29

## 2019-12-26 RX ORDER — EPHEDRINE SULFATE 50 MG/ML
INJECTION, SOLUTION INTRAVENOUS AS NEEDED
Status: DISCONTINUED | OUTPATIENT
Start: 2019-12-26 | End: 2019-12-26 | Stop reason: SURG

## 2019-12-26 RX ORDER — FENTANYL CITRATE 50 UG/ML
INJECTION, SOLUTION INTRAMUSCULAR; INTRAVENOUS AS NEEDED
Status: DISCONTINUED | OUTPATIENT
Start: 2019-12-26 | End: 2019-12-26 | Stop reason: SURG

## 2019-12-26 RX ORDER — PROMETHAZINE HYDROCHLORIDE 25 MG/1
25 SUPPOSITORY RECTAL ONCE AS NEEDED
Status: DISCONTINUED | OUTPATIENT
Start: 2019-12-26 | End: 2019-12-26 | Stop reason: HOSPADM

## 2019-12-26 RX ORDER — MIDAZOLAM HYDROCHLORIDE 1 MG/ML
INJECTION INTRAMUSCULAR; INTRAVENOUS AS NEEDED
Status: DISCONTINUED | OUTPATIENT
Start: 2019-12-26 | End: 2019-12-26 | Stop reason: SURG

## 2019-12-26 RX ORDER — BACITRACIN 50000 [IU]/1
INJECTION, POWDER, FOR SOLUTION INTRAMUSCULAR AS NEEDED
Status: DISCONTINUED | OUTPATIENT
Start: 2019-12-26 | End: 2019-12-26 | Stop reason: HOSPADM

## 2019-12-26 RX ORDER — HYDROMORPHONE HCL 110MG/55ML
PATIENT CONTROLLED ANALGESIA SYRINGE INTRAVENOUS AS NEEDED
Status: DISCONTINUED | OUTPATIENT
Start: 2019-12-26 | End: 2019-12-26 | Stop reason: SURG

## 2019-12-26 RX ORDER — PROMETHAZINE HYDROCHLORIDE 25 MG/ML
12.5 INJECTION, SOLUTION INTRAMUSCULAR; INTRAVENOUS ONCE AS NEEDED
Status: DISCONTINUED | OUTPATIENT
Start: 2019-12-26 | End: 2019-12-26 | Stop reason: HOSPADM

## 2019-12-26 RX ORDER — PROMETHAZINE HYDROCHLORIDE 25 MG/1
25 TABLET ORAL ONCE AS NEEDED
Status: DISCONTINUED | OUTPATIENT
Start: 2019-12-26 | End: 2019-12-26 | Stop reason: HOSPADM

## 2019-12-26 RX ORDER — ACETAMINOPHEN 325 MG/1
650 TABLET ORAL ONCE AS NEEDED
Status: DISCONTINUED | OUTPATIENT
Start: 2019-12-26 | End: 2019-12-26 | Stop reason: HOSPADM

## 2019-12-26 RX ORDER — SUCCINYLCHOLINE CHLORIDE 20 MG/ML
INJECTION INTRAMUSCULAR; INTRAVENOUS AS NEEDED
Status: DISCONTINUED | OUTPATIENT
Start: 2019-12-26 | End: 2019-12-26 | Stop reason: SURG

## 2019-12-26 RX ORDER — LISINOPRIL 10 MG/1
10 TABLET ORAL DAILY
Status: DISCONTINUED | OUTPATIENT
Start: 2019-12-26 | End: 2019-12-29

## 2019-12-26 RX ORDER — ACETAMINOPHEN 500 MG
1000 TABLET ORAL 4 TIMES DAILY
Status: DISCONTINUED | OUTPATIENT
Start: 2019-12-26 | End: 2020-01-04 | Stop reason: HOSPADM

## 2019-12-26 RX ORDER — ACETAMINOPHEN 650 MG/1
650 SUPPOSITORY RECTAL ONCE AS NEEDED
Status: DISCONTINUED | OUTPATIENT
Start: 2019-12-26 | End: 2019-12-26 | Stop reason: HOSPADM

## 2019-12-26 RX ORDER — NALOXONE HCL 0.4 MG/ML
0.4 VIAL (ML) INJECTION AS NEEDED
Status: DISCONTINUED | OUTPATIENT
Start: 2019-12-26 | End: 2019-12-26 | Stop reason: HOSPADM

## 2019-12-26 RX ORDER — PROPOFOL 10 MG/ML
VIAL (ML) INTRAVENOUS AS NEEDED
Status: DISCONTINUED | OUTPATIENT
Start: 2019-12-26 | End: 2019-12-26 | Stop reason: SURG

## 2019-12-26 RX ORDER — LABETALOL HYDROCHLORIDE 5 MG/ML
5 INJECTION, SOLUTION INTRAVENOUS
Status: DISCONTINUED | OUTPATIENT
Start: 2019-12-26 | End: 2019-12-26 | Stop reason: HOSPADM

## 2019-12-26 RX ORDER — EPHEDRINE SULFATE 50 MG/ML
5 INJECTION, SOLUTION INTRAVENOUS ONCE AS NEEDED
Status: DISCONTINUED | OUTPATIENT
Start: 2019-12-26 | End: 2019-12-26 | Stop reason: HOSPADM

## 2019-12-26 RX ORDER — ONDANSETRON 4 MG/1
4 TABLET, FILM COATED ORAL EVERY 6 HOURS PRN
Status: DISCONTINUED | OUTPATIENT
Start: 2019-12-26 | End: 2020-01-04 | Stop reason: HOSPADM

## 2019-12-26 RX ORDER — FERROUS SULFATE TAB EC 324 MG (65 MG FE EQUIVALENT) 324 (65 FE) MG
324 TABLET DELAYED RESPONSE ORAL
Status: DISCONTINUED | OUTPATIENT
Start: 2019-12-27 | End: 2020-01-04 | Stop reason: HOSPADM

## 2019-12-26 RX ORDER — DOCUSATE SODIUM 100 MG/1
100 CAPSULE, LIQUID FILLED ORAL 2 TIMES DAILY PRN
Status: DISCONTINUED | OUTPATIENT
Start: 2019-12-26 | End: 2019-12-26 | Stop reason: SDUPTHER

## 2019-12-26 RX ORDER — CARVEDILOL 3.12 MG/1
3.12 TABLET ORAL 2 TIMES DAILY WITH MEALS
Status: DISCONTINUED | OUTPATIENT
Start: 2019-12-26 | End: 2020-01-04 | Stop reason: HOSPADM

## 2019-12-26 RX ORDER — TERAZOSIN 5 MG/1
5 CAPSULE ORAL NIGHTLY
Status: DISCONTINUED | OUTPATIENT
Start: 2019-12-26 | End: 2020-01-04 | Stop reason: HOSPADM

## 2019-12-26 RX ORDER — AMOXICILLIN 250 MG
2 CAPSULE ORAL NIGHTLY
Status: DISCONTINUED | OUTPATIENT
Start: 2019-12-26 | End: 2020-01-04 | Stop reason: HOSPADM

## 2019-12-26 RX ORDER — CYCLOBENZAPRINE HCL 10 MG
10 TABLET ORAL 3 TIMES DAILY PRN
Status: DISCONTINUED | OUTPATIENT
Start: 2019-12-26 | End: 2020-01-04 | Stop reason: HOSPADM

## 2019-12-26 RX ORDER — LIDOCAINE HYDROCHLORIDE 20 MG/ML
INJECTION, SOLUTION INFILTRATION; PERINEURAL AS NEEDED
Status: DISCONTINUED | OUTPATIENT
Start: 2019-12-26 | End: 2019-12-26 | Stop reason: SURG

## 2019-12-26 RX ORDER — TRAMADOL HYDROCHLORIDE 50 MG/1
50 TABLET ORAL EVERY 6 HOURS PRN
Status: DISCONTINUED | OUTPATIENT
Start: 2019-12-26 | End: 2019-12-26 | Stop reason: SDUPTHER

## 2019-12-26 RX ORDER — ROCURONIUM BROMIDE 10 MG/ML
INJECTION, SOLUTION INTRAVENOUS AS NEEDED
Status: DISCONTINUED | OUTPATIENT
Start: 2019-12-26 | End: 2019-12-26 | Stop reason: SURG

## 2019-12-26 RX ORDER — ONDANSETRON 4 MG/1
4 TABLET, FILM COATED ORAL EVERY 6 HOURS PRN
Status: DISCONTINUED | OUTPATIENT
Start: 2019-12-26 | End: 2019-12-26 | Stop reason: SDUPTHER

## 2019-12-26 RX ORDER — FAMOTIDINE 40 MG/1
40 TABLET, FILM COATED ORAL DAILY
Status: DISCONTINUED | OUTPATIENT
Start: 2019-12-26 | End: 2020-01-04 | Stop reason: HOSPADM

## 2019-12-26 RX ORDER — OXYCODONE HYDROCHLORIDE 5 MG/1
5 TABLET ORAL EVERY 4 HOURS PRN
Status: DISCONTINUED | OUTPATIENT
Start: 2019-12-26 | End: 2020-01-04 | Stop reason: HOSPADM

## 2019-12-26 RX ORDER — NEOSTIGMINE METHYLSULFATE 4 MG/4 ML
SYRINGE (ML) INTRAVENOUS AS NEEDED
Status: DISCONTINUED | OUTPATIENT
Start: 2019-12-26 | End: 2019-12-26 | Stop reason: SURG

## 2019-12-26 RX ORDER — ASPIRIN 325 MG
325 TABLET, DELAYED RELEASE (ENTERIC COATED) ORAL EVERY 12 HOURS SCHEDULED
Status: DISCONTINUED | OUTPATIENT
Start: 2019-12-27 | End: 2020-01-04 | Stop reason: HOSPADM

## 2019-12-26 RX ORDER — SODIUM CHLORIDE, SODIUM GLUCONATE, SODIUM ACETATE, POTASSIUM CHLORIDE, AND MAGNESIUM CHLORIDE 526; 502; 368; 37; 30 MG/100ML; MG/100ML; MG/100ML; MG/100ML; MG/100ML
1000 INJECTION, SOLUTION INTRAVENOUS CONTINUOUS
Status: ACTIVE | OUTPATIENT
Start: 2019-12-26 | End: 2019-12-28

## 2019-12-26 RX ORDER — DOCUSATE SODIUM 100 MG/1
100 CAPSULE, LIQUID FILLED ORAL 2 TIMES DAILY PRN
Status: DISCONTINUED | OUTPATIENT
Start: 2019-12-26 | End: 2020-01-04 | Stop reason: HOSPADM

## 2019-12-26 RX ORDER — DIPHENHYDRAMINE HYDROCHLORIDE 50 MG/ML
12.5 INJECTION INTRAMUSCULAR; INTRAVENOUS
Status: DISCONTINUED | OUTPATIENT
Start: 2019-12-26 | End: 2019-12-26 | Stop reason: HOSPADM

## 2019-12-26 RX ORDER — FLUMAZENIL 0.1 MG/ML
0.2 INJECTION INTRAVENOUS AS NEEDED
Status: DISCONTINUED | OUTPATIENT
Start: 2019-12-26 | End: 2019-12-26 | Stop reason: HOSPADM

## 2019-12-26 RX ORDER — NALOXONE HCL 0.4 MG/ML
0.1 VIAL (ML) INJECTION
Status: DISCONTINUED | OUTPATIENT
Start: 2019-12-26 | End: 2020-01-04 | Stop reason: HOSPADM

## 2019-12-26 RX ORDER — SODIUM CHLORIDE 0.9 % (FLUSH) 0.9 %
3-10 SYRINGE (ML) INJECTION AS NEEDED
Status: DISCONTINUED | OUTPATIENT
Start: 2019-12-26 | End: 2020-01-04 | Stop reason: HOSPADM

## 2019-12-26 RX ORDER — BUSPIRONE HYDROCHLORIDE 5 MG/1
5 TABLET ORAL 2 TIMES DAILY
Status: DISCONTINUED | OUTPATIENT
Start: 2019-12-26 | End: 2020-01-04 | Stop reason: HOSPADM

## 2019-12-26 RX ORDER — OXYCODONE HCL 10 MG/1
10 TABLET, FILM COATED, EXTENDED RELEASE ORAL EVERY 12 HOURS SCHEDULED
Status: DISPENSED | OUTPATIENT
Start: 2019-12-26 | End: 2019-12-31

## 2019-12-26 RX ORDER — 0.9 % SODIUM CHLORIDE 0.9 %
VIAL (ML) INJECTION AS NEEDED
Status: DISCONTINUED | OUTPATIENT
Start: 2019-12-26 | End: 2019-12-26 | Stop reason: HOSPADM

## 2019-12-26 RX ADMIN — FENTANYL CITRATE 25 MCG: 50 INJECTION, SOLUTION INTRAMUSCULAR; INTRAVENOUS at 12:43

## 2019-12-26 RX ADMIN — PHENYLEPHRINE HYDROCHLORIDE 100 MCG: 10 INJECTION INTRAVENOUS at 11:14

## 2019-12-26 RX ADMIN — FENTANYL CITRATE 25 MCG: 50 INJECTION, SOLUTION INTRAMUSCULAR; INTRAVENOUS at 11:34

## 2019-12-26 RX ADMIN — EPHEDRINE SULFATE 10 MG: 50 INJECTION INTRAVENOUS at 13:56

## 2019-12-26 RX ADMIN — ROCURONIUM BROMIDE 10 MG: 10 INJECTION INTRAVENOUS at 13:01

## 2019-12-26 RX ADMIN — SENNOSIDES AND DOCUSATE SODIUM 2 TABLET: 8.6; 5 TABLET ORAL at 20:14

## 2019-12-26 RX ADMIN — PROPOFOL 200 MG: 10 INJECTION, EMULSION INTRAVENOUS at 10:44

## 2019-12-26 RX ADMIN — GLYCOPYRROLATE 0.2 MG: 0.2 INJECTION, SOLUTION INTRAMUSCULAR; INTRAVITREAL at 10:51

## 2019-12-26 RX ADMIN — PHENYLEPHRINE HYDROCHLORIDE 100 MCG: 10 INJECTION INTRAVENOUS at 14:25

## 2019-12-26 RX ADMIN — PHENYLEPHRINE HYDROCHLORIDE 100 MCG: 10 INJECTION INTRAVENOUS at 13:47

## 2019-12-26 RX ADMIN — EPHEDRINE SULFATE 10 MG: 50 INJECTION INTRAVENOUS at 13:31

## 2019-12-26 RX ADMIN — VANCOMYCIN HYDROCHLORIDE 2500 MG: 1 INJECTION, POWDER, LYOPHILIZED, FOR SOLUTION INTRAVENOUS at 11:43

## 2019-12-26 RX ADMIN — GLYCOPYRROLATE 0.2 MG: 0.2 INJECTION, SOLUTION INTRAMUSCULAR; INTRAVITREAL at 10:52

## 2019-12-26 RX ADMIN — FENTANYL CITRATE 25 MCG: 50 INJECTION, SOLUTION INTRAMUSCULAR; INTRAVENOUS at 13:52

## 2019-12-26 RX ADMIN — ROCURONIUM BROMIDE 5 MG: 10 INJECTION INTRAVENOUS at 10:44

## 2019-12-26 RX ADMIN — SODIUM CHLORIDE, SODIUM GLUCONATE, SODIUM ACETATE, POTASSIUM CHLORIDE, AND MAGNESIUM CHLORIDE: 526; 502; 368; 37; 30 INJECTION, SOLUTION INTRAVENOUS at 11:37

## 2019-12-26 RX ADMIN — OXYCODONE HYDROCHLORIDE 10 MG: 10 TABLET, FILM COATED, EXTENDED RELEASE ORAL at 23:17

## 2019-12-26 RX ADMIN — FENTANYL CITRATE 25 MCG: 50 INJECTION, SOLUTION INTRAMUSCULAR; INTRAVENOUS at 13:11

## 2019-12-26 RX ADMIN — SODIUM CHLORIDE, SODIUM GLUCONATE, SODIUM ACETATE, POTASSIUM CHLORIDE, AND MAGNESIUM CHLORIDE: 526; 502; 368; 37; 30 INJECTION, SOLUTION INTRAVENOUS at 14:46

## 2019-12-26 RX ADMIN — ONDANSETRON 4 MG: 2 INJECTION INTRAMUSCULAR; INTRAVENOUS at 14:48

## 2019-12-26 RX ADMIN — EPHEDRINE SULFATE 20 MG: 50 INJECTION INTRAVENOUS at 11:10

## 2019-12-26 RX ADMIN — Medication 3 MG: at 14:51

## 2019-12-26 RX ADMIN — FENTANYL CITRATE 25 MCG: 50 INJECTION, SOLUTION INTRAMUSCULAR; INTRAVENOUS at 14:19

## 2019-12-26 RX ADMIN — ROCURONIUM BROMIDE 20 MG: 10 INJECTION INTRAVENOUS at 12:23

## 2019-12-26 RX ADMIN — GLYCOPYRROLATE 0.2 MG: 0.2 INJECTION, SOLUTION INTRAMUSCULAR; INTRAVITREAL at 10:59

## 2019-12-26 RX ADMIN — FAMOTIDINE 40 MG: 40 TABLET ORAL at 17:37

## 2019-12-26 RX ADMIN — GLYCOPYRROLATE 0.6 MG: 0.2 INJECTION, SOLUTION INTRAMUSCULAR; INTRAVITREAL at 14:51

## 2019-12-26 RX ADMIN — VANCOMYCIN HYDROCHLORIDE 1500 MG: 500 INJECTION, POWDER, LYOPHILIZED, FOR SOLUTION INTRAVENOUS at 20:16

## 2019-12-26 RX ADMIN — PHENYLEPHRINE HYDROCHLORIDE 100 MCG: 10 INJECTION INTRAVENOUS at 11:50

## 2019-12-26 RX ADMIN — MIDAZOLAM HYDROCHLORIDE 1 MG: 2 INJECTION, SOLUTION INTRAMUSCULAR; INTRAVENOUS at 10:34

## 2019-12-26 RX ADMIN — FENTANYL CITRATE 50 MCG: 50 INJECTION, SOLUTION INTRAMUSCULAR; INTRAVENOUS at 10:44

## 2019-12-26 RX ADMIN — HYDROMORPHONE HYDROCHLORIDE 0.5 MG: 1 INJECTION, SOLUTION INTRAMUSCULAR; INTRAVENOUS; SUBCUTANEOUS at 16:16

## 2019-12-26 RX ADMIN — PROPOFOL 40 MG: 10 INJECTION, EMULSION INTRAVENOUS at 15:06

## 2019-12-26 RX ADMIN — HYDROMORPHONE HYDROCHLORIDE 0.5 MG: 1 INJECTION, SOLUTION INTRAMUSCULAR; INTRAVENOUS; SUBCUTANEOUS at 18:58

## 2019-12-26 RX ADMIN — HYDROMORPHONE HYDROCHLORIDE 0.2 MG: 2 INJECTION INTRAMUSCULAR; INTRAVENOUS; SUBCUTANEOUS at 15:32

## 2019-12-26 RX ADMIN — PHENYLEPHRINE HYDROCHLORIDE 100 MCG: 10 INJECTION INTRAVENOUS at 12:40

## 2019-12-26 RX ADMIN — SODIUM CHLORIDE 75 ML/HR: 9 INJECTION, SOLUTION INTRAVENOUS at 17:37

## 2019-12-26 RX ADMIN — SUCCINYLCHOLINE CHLORIDE 200 MG: 20 INJECTION, SOLUTION INTRAMUSCULAR; INTRAVENOUS at 10:44

## 2019-12-26 RX ADMIN — ROCURONIUM BROMIDE 30 MG: 10 INJECTION INTRAVENOUS at 10:57

## 2019-12-26 RX ADMIN — TRANEXAMIC ACID 1000 MG: 100 INJECTION, SOLUTION INTRAVENOUS at 14:25

## 2019-12-26 RX ADMIN — ROCURONIUM BROMIDE 15 MG: 10 INJECTION INTRAVENOUS at 10:49

## 2019-12-26 RX ADMIN — FENTANYL CITRATE 25 MCG: 50 INJECTION, SOLUTION INTRAMUSCULAR; INTRAVENOUS at 14:45

## 2019-12-26 RX ADMIN — ACETAMINOPHEN 1000 MG: 500 TABLET ORAL at 17:36

## 2019-12-26 RX ADMIN — CARVEDILOL 3.12 MG: 3.12 TABLET, FILM COATED ORAL at 17:36

## 2019-12-26 RX ADMIN — LIDOCAINE HYDROCHLORIDE 100 MG: 20 INJECTION, SOLUTION INFILTRATION; PERINEURAL at 10:44

## 2019-12-26 RX ADMIN — ACETAMINOPHEN 1000 MG: 500 TABLET ORAL at 20:14

## 2019-12-26 RX ADMIN — SODIUM CHLORIDE, SODIUM GLUCONATE, SODIUM ACETATE, POTASSIUM CHLORIDE, AND MAGNESIUM CHLORIDE 1000 ML: 526; 502; 368; 37; 30 INJECTION, SOLUTION INTRAVENOUS at 09:33

## 2019-12-26 RX ADMIN — ROCURONIUM BROMIDE 20 MG: 10 INJECTION INTRAVENOUS at 11:01

## 2019-12-26 RX ADMIN — HYDROMORPHONE HYDROCHLORIDE 0.2 MG: 2 INJECTION INTRAMUSCULAR; INTRAVENOUS; SUBCUTANEOUS at 15:17

## 2019-12-26 NOTE — ANESTHESIA PREPROCEDURE EVALUATION
Anesthesia Evaluation     no history of anesthetic complications:  NPO Solid Status: > 8 hours  NPO Liquid Status: > 2 hours           Airway   Mallampati: III  TM distance: >3 FB  Neck ROM: limited  Difficult intubation highly probable and Large neck circumference  Dental    (+) poor dentition        Pulmonary     breath sounds clear to auscultation  (-) COPD, asthma, sleep apnea, not a smoker    ROS comment: Seasonal allergies  Cardiovascular   Exercise tolerance: poor (<4 METS)    ECG reviewed  PT is on anticoagulation therapy  Patient on routine beta blocker and Beta blocker given within 24 hours of surgery  Rhythm: regular  Rate: normal    (+) hypertension poorly controlled, valvular problems/murmurs (Bovine Aortic valve),   (-) dysrhythmias, angina, cardiac stents, DVT, hyperlipidemia    ROS comment:   Normal sinus rhythm  Left axis deviation  Right bundle branch block  Abnormal ECG  No previous ECGs available  Confirmed by [facility]      Neuro/Psych  (+) CVA (2011 denies residuals), psychiatric history Anxiety and Depression,     (-) seizures, TIA, headaches  GI/Hepatic/Renal/Endo    (+) morbid obesity, GERD well controlled,  renal disease (hx of stones) stones,   (-) hepatitis, liver disease, diabetes    Musculoskeletal     (+) arthralgias,   Abdominal   (+) obese,    Substance History - negative use  (-) alcohol use, drug use     OB/GYN          Other   arthritis,      (-) history of cancer      Phys Exam Other: House arrest band on left arm                  Anesthesia Plan    ASA 4     general   ([facility] in room  Did not offer block or spinal due to body habitus)  intravenous induction     Anesthetic plan, all risks, benefits, and alternatives have been provided, discussed and informed consent has been obtained with: patient.

## 2019-12-26 NOTE — ANESTHESIA PROCEDURE NOTES
Airway  Urgency: elective    Date/Time: 12/26/2019 10:46 AM  Airway not difficult    General Information and Staff    Patient location during procedure: OR  CRNA: Danilo Huerta CRNA    Indications and Patient Condition  Indications for airway management: airway protection    Preoxygenated: yes  Mask difficulty assessment: 0 - not attempted    Final Airway Details  Final airway type: endotracheal airway      Successful airway: ETT    Successful intubation technique: video laryngoscopy  Facilitating devices/methods: intubating stylet  Endotracheal tube insertion site: oral  Blade: Mcmahon  Blade size: 4  ETT size (mm): 7.5  Cormack-Lehane Classification: grade I - full view of glottis  Placement verified by: chest auscultation and capnometry   Measured from: lips  ETT/EBT  to lips (cm): 23  Number of attempts at approach: 1  Assessment: lips, teeth, and gum same as pre-op and atraumatic intubation

## 2019-12-26 NOTE — ANESTHESIA POSTPROCEDURE EVALUATION
Patient: Michael Sorto    Procedure Summary     Date:  12/26/19 Room / Location:  [facility] OR  / [facility] OR    Anesthesia Start:  1037 Anesthesia Stop:  1540    Procedure:  RIGHT TOTAL KNEE ARTHROPLASTY REVISION (Right Knee) Diagnosis:       Pyogenic arthritis of right knee joint, due to unspecified organism (CMS/[facility])      Painful total knee replacement, initial encounter (CMS/HCC)      (Pyogenic arthritis of right knee joint, due to unspecified organism (CMS/[facility]) [M00.9])      (Painful total knee replacement, initial encounter (CMS/HCC) [T84.84XA, Z96.659])    Surgeon:  Rufino Lu MD Provider:  Benjamin Claudio MD    Anesthesia Type:  general ASA Status:  4          Anesthesia Type: general    Vitals  No vitals data found for the desired time range.          Post Anesthesia Care and Evaluation    Patient location during evaluation: PACU  Patient participation: complete - patient participated  Level of consciousness: awake and alert  Pain score: 2  Pain management: adequate  Airway patency: patent  Anesthetic complications: No anesthetic complications  PONV Status: none  Cardiovascular status: acceptable and hemodynamically stable  Respiratory status: acceptable, face mask and spontaneous ventilation  Hydration status: acceptable

## 2019-12-27 LAB
ANION GAP SERPL CALCULATED.3IONS-SCNC: 10 MMOL/L (ref 5–15)
BUN BLD-MCNC: 22 MG/DL (ref 8–23)
BUN/CREAT SERPL: 15.5 (ref 7–25)
CALCIUM SPEC-SCNC: 8 MG/DL (ref 8.6–10.5)
CHLORIDE SERPL-SCNC: 105 MMOL/L (ref 98–107)
CO2 SERPL-SCNC: 25 MMOL/L (ref 22–29)
CREAT BLD-MCNC: 1.42 MG/DL (ref 0.76–1.27)
DEPRECATED RDW RBC AUTO: 47.3 FL (ref 37–54)
ERYTHROCYTE [DISTWIDTH] IN BLOOD BY AUTOMATED COUNT: 14.3 % (ref 12.3–15.4)
GFR SERPL CREATININE-BSD FRML MDRD: 50 ML/MIN/1.73
GLUCOSE BLD-MCNC: 134 MG/DL (ref 65–99)
HCT VFR BLD AUTO: 36 % (ref 37.5–51)
HGB BLD-MCNC: 12.1 G/DL (ref 13–17.7)
MCH RBC QN AUTO: 30.7 PG (ref 26.6–33)
MCHC RBC AUTO-ENTMCNC: 33.6 G/DL (ref 31.5–35.7)
MCV RBC AUTO: 91.4 FL (ref 79–97)
PLATELET # BLD AUTO: 114 10*3/MM3 (ref 140–450)
PMV BLD AUTO: 10.8 FL (ref 6–12)
POTASSIUM BLD-SCNC: 4.7 MMOL/L (ref 3.5–5.2)
RBC # BLD AUTO: 3.94 10*6/MM3 (ref 4.14–5.8)
SODIUM BLD-SCNC: 140 MMOL/L (ref 136–145)
WBC NRBC COR # BLD: 9.08 10*3/MM3 (ref 3.4–10.8)

## 2019-12-27 PROCEDURE — 97110 THERAPEUTIC EXERCISES: CPT

## 2019-12-27 PROCEDURE — 25010000002 HYDROMORPHONE 1 MG/ML SOLUTION: Performed by: ORTHOPAEDIC SURGERY

## 2019-12-27 PROCEDURE — 80048 BASIC METABOLIC PNL TOTAL CA: CPT | Performed by: ORTHOPAEDIC SURGERY

## 2019-12-27 PROCEDURE — 99024 POSTOP FOLLOW-UP VISIT: CPT | Performed by: ORTHOPAEDIC SURGERY

## 2019-12-27 PROCEDURE — 85027 COMPLETE CBC AUTOMATED: CPT | Performed by: ORTHOPAEDIC SURGERY

## 2019-12-27 PROCEDURE — 97166 OT EVAL MOD COMPLEX 45 MIN: CPT

## 2019-12-27 PROCEDURE — 97530 THERAPEUTIC ACTIVITIES: CPT

## 2019-12-27 RX ADMIN — OXYCODONE HYDROCHLORIDE 10 MG: 10 TABLET, FILM COATED, EXTENDED RELEASE ORAL at 20:27

## 2019-12-27 RX ADMIN — ASPIRIN 325 MG: 325 TABLET, DELAYED RELEASE ORAL at 09:07

## 2019-12-27 RX ADMIN — BUSPIRONE HYDROCHLORIDE 5 MG: 5 TABLET ORAL at 20:27

## 2019-12-27 RX ADMIN — ACETAMINOPHEN 1000 MG: 500 TABLET ORAL at 20:27

## 2019-12-27 RX ADMIN — ACETAMINOPHEN 1000 MG: 500 TABLET ORAL at 17:28

## 2019-12-27 RX ADMIN — FAMOTIDINE 40 MG: 40 TABLET ORAL at 09:08

## 2019-12-27 RX ADMIN — OXYCODONE HYDROCHLORIDE 10 MG: 10 TABLET, FILM COATED, EXTENDED RELEASE ORAL at 09:08

## 2019-12-27 RX ADMIN — HYDROMORPHONE HYDROCHLORIDE 0.5 MG: 1 INJECTION, SOLUTION INTRAMUSCULAR; INTRAVENOUS; SUBCUTANEOUS at 06:40

## 2019-12-27 RX ADMIN — SODIUM CHLORIDE 75 ML/HR: 9 INJECTION, SOLUTION INTRAVENOUS at 17:27

## 2019-12-27 RX ADMIN — OXYCODONE HYDROCHLORIDE 5 MG: 5 TABLET ORAL at 03:13

## 2019-12-27 RX ADMIN — ASPIRIN 325 MG: 325 TABLET, DELAYED RELEASE ORAL at 20:27

## 2019-12-27 RX ADMIN — TERAZOSIN HYDROCHLORIDE ANHYDROUS 5 MG: 5 CAPSULE ORAL at 20:27

## 2019-12-27 RX ADMIN — ACETAMINOPHEN 1000 MG: 500 TABLET ORAL at 09:07

## 2019-12-27 RX ADMIN — SENNOSIDES AND DOCUSATE SODIUM 2 TABLET: 8.6; 5 TABLET ORAL at 20:26

## 2019-12-27 RX ADMIN — SODIUM CHLORIDE 125 ML/HR: 9 INJECTION, SOLUTION INTRAVENOUS at 09:06

## 2019-12-27 RX ADMIN — CARVEDILOL 3.12 MG: 3.12 TABLET, FILM COATED ORAL at 17:29

## 2019-12-27 RX ADMIN — FERROUS SULFATE TAB EC 324 MG (65 MG FE EQUIVALENT) 324 MG: 324 (65 FE) TABLET DELAYED RESPONSE at 09:07

## 2019-12-27 RX ADMIN — BUSPIRONE HYDROCHLORIDE 5 MG: 5 TABLET ORAL at 09:07

## 2019-12-28 LAB
DEPRECATED RDW RBC AUTO: 48.5 FL (ref 37–54)
ERYTHROCYTE [DISTWIDTH] IN BLOOD BY AUTOMATED COUNT: 14.4 % (ref 12.3–15.4)
HCT VFR BLD AUTO: 29.3 % (ref 37.5–51)
HGB BLD-MCNC: 9.7 G/DL (ref 13–17.7)
MCH RBC QN AUTO: 30.7 PG (ref 26.6–33)
MCHC RBC AUTO-ENTMCNC: 33.1 G/DL (ref 31.5–35.7)
MCV RBC AUTO: 92.7 FL (ref 79–97)
PLATELET # BLD AUTO: 81 10*3/MM3 (ref 140–450)
PMV BLD AUTO: 10.8 FL (ref 6–12)
RBC # BLD AUTO: 3.16 10*6/MM3 (ref 4.14–5.8)
WBC NRBC COR # BLD: 5.73 10*3/MM3 (ref 3.4–10.8)

## 2019-12-28 PROCEDURE — 97535 SELF CARE MNGMENT TRAINING: CPT

## 2019-12-28 PROCEDURE — 97110 THERAPEUTIC EXERCISES: CPT

## 2019-12-28 PROCEDURE — 85027 COMPLETE CBC AUTOMATED: CPT | Performed by: ORTHOPAEDIC SURGERY

## 2019-12-28 PROCEDURE — 97530 THERAPEUTIC ACTIVITIES: CPT

## 2019-12-28 PROCEDURE — 99024 POSTOP FOLLOW-UP VISIT: CPT | Performed by: ORTHOPAEDIC SURGERY

## 2019-12-28 RX ADMIN — SODIUM CHLORIDE 75 ML/HR: 9 INJECTION, SOLUTION INTRAVENOUS at 17:04

## 2019-12-28 RX ADMIN — SENNOSIDES AND DOCUSATE SODIUM 2 TABLET: 8.6; 5 TABLET ORAL at 20:22

## 2019-12-28 RX ADMIN — BUSPIRONE HYDROCHLORIDE 5 MG: 5 TABLET ORAL at 09:40

## 2019-12-28 RX ADMIN — ASPIRIN 325 MG: 325 TABLET, DELAYED RELEASE ORAL at 20:22

## 2019-12-28 RX ADMIN — FERROUS SULFATE TAB EC 324 MG (65 MG FE EQUIVALENT) 324 MG: 324 (65 FE) TABLET DELAYED RESPONSE at 09:40

## 2019-12-28 RX ADMIN — ACETAMINOPHEN 1000 MG: 500 TABLET ORAL at 19:04

## 2019-12-28 RX ADMIN — ACETAMINOPHEN 1000 MG: 500 TABLET ORAL at 09:40

## 2019-12-28 RX ADMIN — SODIUM CHLORIDE 250 ML: 9 INJECTION, SOLUTION INTRAVENOUS at 13:14

## 2019-12-28 RX ADMIN — SODIUM CHLORIDE 75 ML/HR: 9 INJECTION, SOLUTION INTRAVENOUS at 07:00

## 2019-12-28 RX ADMIN — ACETAMINOPHEN 1000 MG: 500 TABLET ORAL at 22:18

## 2019-12-28 RX ADMIN — ACETAMINOPHEN 1000 MG: 500 TABLET ORAL at 12:25

## 2019-12-28 RX ADMIN — BUSPIRONE HYDROCHLORIDE 5 MG: 5 TABLET ORAL at 20:22

## 2019-12-28 RX ADMIN — FAMOTIDINE 40 MG: 40 TABLET ORAL at 09:40

## 2019-12-29 LAB
BACTERIA SPEC AEROBE CULT: NORMAL
GRAM STN SPEC: NORMAL
GRAM STN SPEC: NORMAL
HCT VFR BLD AUTO: 24.9 % (ref 37.5–51)
HGB BLD-MCNC: 8.4 G/DL (ref 13–17.7)
HOLD SPECIMEN: NORMAL

## 2019-12-29 PROCEDURE — 97530 THERAPEUTIC ACTIVITIES: CPT

## 2019-12-29 PROCEDURE — 97110 THERAPEUTIC EXERCISES: CPT

## 2019-12-29 PROCEDURE — 99024 POSTOP FOLLOW-UP VISIT: CPT | Performed by: ORTHOPAEDIC SURGERY

## 2019-12-29 PROCEDURE — 85014 HEMATOCRIT: CPT | Performed by: ORTHOPAEDIC SURGERY

## 2019-12-29 PROCEDURE — 85018 HEMOGLOBIN: CPT | Performed by: ORTHOPAEDIC SURGERY

## 2019-12-29 RX ADMIN — ASPIRIN 325 MG: 325 TABLET, DELAYED RELEASE ORAL at 20:13

## 2019-12-29 RX ADMIN — ACETAMINOPHEN 1000 MG: 500 TABLET ORAL at 12:47

## 2019-12-29 RX ADMIN — BUSPIRONE HYDROCHLORIDE 5 MG: 5 TABLET ORAL at 20:13

## 2019-12-29 RX ADMIN — FAMOTIDINE 40 MG: 40 TABLET ORAL at 08:45

## 2019-12-29 RX ADMIN — ACETAMINOPHEN 1000 MG: 500 TABLET ORAL at 20:13

## 2019-12-29 RX ADMIN — FERROUS SULFATE TAB EC 324 MG (65 MG FE EQUIVALENT) 324 MG: 324 (65 FE) TABLET DELAYED RESPONSE at 08:45

## 2019-12-29 RX ADMIN — BUSPIRONE HYDROCHLORIDE 5 MG: 5 TABLET ORAL at 08:45

## 2019-12-29 RX ADMIN — CARVEDILOL 3.12 MG: 3.12 TABLET, FILM COATED ORAL at 08:45

## 2019-12-29 RX ADMIN — CARVEDILOL 3.12 MG: 3.12 TABLET, FILM COATED ORAL at 18:04

## 2019-12-29 RX ADMIN — ACETAMINOPHEN 1000 MG: 500 TABLET ORAL at 08:55

## 2019-12-29 RX ADMIN — ASPIRIN 325 MG: 325 TABLET, DELAYED RELEASE ORAL at 08:45

## 2019-12-29 RX ADMIN — SENNOSIDES AND DOCUSATE SODIUM 2 TABLET: 8.6; 5 TABLET ORAL at 20:13

## 2019-12-30 LAB
DEPRECATED RDW RBC AUTO: 48.2 FL (ref 37–54)
ERYTHROCYTE [DISTWIDTH] IN BLOOD BY AUTOMATED COUNT: 14.4 % (ref 12.3–15.4)
HCT VFR BLD AUTO: 24.1 % (ref 37.5–51)
HGB BLD-MCNC: 8 G/DL (ref 13–17.7)
LAB AP CASE REPORT: NORMAL
Lab: NORMAL
MCH RBC QN AUTO: 30.5 PG (ref 26.6–33)
MCHC RBC AUTO-ENTMCNC: 33.2 G/DL (ref 31.5–35.7)
MCV RBC AUTO: 92 FL (ref 79–97)
PATH REPORT.FINAL DX SPEC: NORMAL
PATH REPORT.GROSS SPEC: NORMAL
PLATELET # BLD AUTO: 81 10*3/MM3 (ref 140–450)
PMV BLD AUTO: 10.8 FL (ref 6–12)
RBC # BLD AUTO: 2.62 10*6/MM3 (ref 4.14–5.8)
WBC NRBC COR # BLD: 4.51 10*3/MM3 (ref 3.4–10.8)

## 2019-12-30 PROCEDURE — 99024 POSTOP FOLLOW-UP VISIT: CPT | Performed by: ORTHOPAEDIC SURGERY

## 2019-12-30 PROCEDURE — 97530 THERAPEUTIC ACTIVITIES: CPT

## 2019-12-30 PROCEDURE — 85027 COMPLETE CBC AUTOMATED: CPT | Performed by: ORTHOPAEDIC SURGERY

## 2019-12-30 PROCEDURE — 97535 SELF CARE MNGMENT TRAINING: CPT

## 2019-12-30 PROCEDURE — 97110 THERAPEUTIC EXERCISES: CPT

## 2019-12-30 RX ORDER — FUROSEMIDE 20 MG/1
20 TABLET ORAL ONCE
Status: COMPLETED | OUTPATIENT
Start: 2019-12-30 | End: 2019-12-30

## 2019-12-30 RX ADMIN — SENNOSIDES AND DOCUSATE SODIUM 2 TABLET: 8.6; 5 TABLET ORAL at 21:57

## 2019-12-30 RX ADMIN — FUROSEMIDE 20 MG: 20 TABLET ORAL at 17:11

## 2019-12-30 RX ADMIN — FERROUS SULFATE TAB EC 324 MG (65 MG FE EQUIVALENT) 324 MG: 324 (65 FE) TABLET DELAYED RESPONSE at 08:37

## 2019-12-30 RX ADMIN — CARVEDILOL 3.12 MG: 3.12 TABLET, FILM COATED ORAL at 17:11

## 2019-12-30 RX ADMIN — OXYCODONE HYDROCHLORIDE 10 MG: 10 TABLET, FILM COATED, EXTENDED RELEASE ORAL at 21:58

## 2019-12-30 RX ADMIN — ACETAMINOPHEN 1000 MG: 500 TABLET ORAL at 08:37

## 2019-12-30 RX ADMIN — OXYCODONE HYDROCHLORIDE 10 MG: 10 TABLET, FILM COATED, EXTENDED RELEASE ORAL at 08:37

## 2019-12-30 RX ADMIN — CARVEDILOL 3.12 MG: 3.12 TABLET, FILM COATED ORAL at 08:37

## 2019-12-30 RX ADMIN — TERAZOSIN HYDROCHLORIDE ANHYDROUS 5 MG: 5 CAPSULE ORAL at 21:57

## 2019-12-30 RX ADMIN — SODIUM CHLORIDE, PRESERVATIVE FREE 3 ML: 5 INJECTION INTRAVENOUS at 21:58

## 2019-12-30 RX ADMIN — BUSPIRONE HYDROCHLORIDE 5 MG: 5 TABLET ORAL at 21:58

## 2019-12-30 RX ADMIN — ACETAMINOPHEN 1000 MG: 500 TABLET ORAL at 13:18

## 2019-12-30 RX ADMIN — ACETAMINOPHEN 1000 MG: 500 TABLET ORAL at 17:11

## 2019-12-30 RX ADMIN — ASPIRIN 325 MG: 325 TABLET, DELAYED RELEASE ORAL at 08:37

## 2019-12-30 RX ADMIN — ACETAMINOPHEN 1000 MG: 500 TABLET ORAL at 22:51

## 2019-12-30 RX ADMIN — FAMOTIDINE 40 MG: 40 TABLET ORAL at 08:37

## 2019-12-31 LAB
ANION GAP SERPL CALCULATED.3IONS-SCNC: 7 MMOL/L (ref 5–15)
BACTERIA SPEC ANAEROBE CULT: NORMAL
BUN BLD-MCNC: 23 MG/DL (ref 8–23)
BUN/CREAT SERPL: 20.4 (ref 7–25)
CALCIUM SPEC-SCNC: 8.3 MG/DL (ref 8.6–10.5)
CHLORIDE SERPL-SCNC: 107 MMOL/L (ref 98–107)
CO2 SERPL-SCNC: 27 MMOL/L (ref 22–29)
CREAT BLD-MCNC: 1.13 MG/DL (ref 0.76–1.27)
DEPRECATED RDW RBC AUTO: 47.6 FL (ref 37–54)
ERYTHROCYTE [DISTWIDTH] IN BLOOD BY AUTOMATED COUNT: 14.1 % (ref 12.3–15.4)
GFR SERPL CREATININE-BSD FRML MDRD: 66 ML/MIN/1.73
GLUCOSE BLD-MCNC: 104 MG/DL (ref 65–99)
HCT VFR BLD AUTO: 24.5 % (ref 37.5–51)
HGB BLD-MCNC: 8.1 G/DL (ref 13–17.7)
MCH RBC QN AUTO: 30.7 PG (ref 26.6–33)
MCHC RBC AUTO-ENTMCNC: 33.1 G/DL (ref 31.5–35.7)
MCV RBC AUTO: 92.8 FL (ref 79–97)
PLATELET # BLD AUTO: 107 10*3/MM3 (ref 140–450)
PMV BLD AUTO: 10.4 FL (ref 6–12)
POTASSIUM BLD-SCNC: 4.1 MMOL/L (ref 3.5–5.2)
RBC # BLD AUTO: 2.64 10*6/MM3 (ref 4.14–5.8)
SODIUM BLD-SCNC: 141 MMOL/L (ref 136–145)
WBC NRBC COR # BLD: 3.68 10*3/MM3 (ref 3.4–10.8)

## 2019-12-31 PROCEDURE — 80048 BASIC METABOLIC PNL TOTAL CA: CPT | Performed by: ORTHOPAEDIC SURGERY

## 2019-12-31 PROCEDURE — 97116 GAIT TRAINING THERAPY: CPT

## 2019-12-31 PROCEDURE — 97110 THERAPEUTIC EXERCISES: CPT

## 2019-12-31 PROCEDURE — 97530 THERAPEUTIC ACTIVITIES: CPT

## 2019-12-31 PROCEDURE — 85027 COMPLETE CBC AUTOMATED: CPT | Performed by: ORTHOPAEDIC SURGERY

## 2019-12-31 PROCEDURE — 97535 SELF CARE MNGMENT TRAINING: CPT

## 2019-12-31 PROCEDURE — 99024 POSTOP FOLLOW-UP VISIT: CPT | Performed by: ORTHOPAEDIC SURGERY

## 2019-12-31 RX ADMIN — ACETAMINOPHEN 1000 MG: 500 TABLET ORAL at 17:52

## 2019-12-31 RX ADMIN — SODIUM CHLORIDE, PRESERVATIVE FREE 3 ML: 5 INJECTION INTRAVENOUS at 08:28

## 2019-12-31 RX ADMIN — SENNOSIDES AND DOCUSATE SODIUM 2 TABLET: 8.6; 5 TABLET ORAL at 20:13

## 2019-12-31 RX ADMIN — BUSPIRONE HYDROCHLORIDE 5 MG: 5 TABLET ORAL at 08:27

## 2019-12-31 RX ADMIN — ACETAMINOPHEN 1000 MG: 500 TABLET ORAL at 12:53

## 2019-12-31 RX ADMIN — TERAZOSIN HYDROCHLORIDE ANHYDROUS 5 MG: 5 CAPSULE ORAL at 20:14

## 2019-12-31 RX ADMIN — FERROUS SULFATE TAB EC 324 MG (65 MG FE EQUIVALENT) 324 MG: 324 (65 FE) TABLET DELAYED RESPONSE at 08:26

## 2019-12-31 RX ADMIN — OXYCODONE HYDROCHLORIDE 10 MG: 10 TABLET, FILM COATED, EXTENDED RELEASE ORAL at 08:27

## 2019-12-31 RX ADMIN — BUSPIRONE HYDROCHLORIDE 5 MG: 5 TABLET ORAL at 20:13

## 2019-12-31 RX ADMIN — FAMOTIDINE 40 MG: 40 TABLET ORAL at 08:27

## 2019-12-31 RX ADMIN — CARVEDILOL 3.12 MG: 3.12 TABLET, FILM COATED ORAL at 08:26

## 2019-12-31 RX ADMIN — ACETAMINOPHEN 1000 MG: 500 TABLET ORAL at 20:14

## 2019-12-31 RX ADMIN — ACETAMINOPHEN 1000 MG: 500 TABLET ORAL at 08:26

## 2019-12-31 RX ADMIN — ASPIRIN 325 MG: 325 TABLET, DELAYED RELEASE ORAL at 20:14

## 2019-12-31 RX ADMIN — SODIUM CHLORIDE, PRESERVATIVE FREE 3 ML: 5 INJECTION INTRAVENOUS at 20:14

## 2019-12-31 RX ADMIN — CARVEDILOL 3.12 MG: 3.12 TABLET, FILM COATED ORAL at 17:52

## 2020-01-01 PROCEDURE — 97530 THERAPEUTIC ACTIVITIES: CPT

## 2020-01-01 PROCEDURE — 97116 GAIT TRAINING THERAPY: CPT

## 2020-01-01 PROCEDURE — 97535 SELF CARE MNGMENT TRAINING: CPT

## 2020-01-01 PROCEDURE — 97110 THERAPEUTIC EXERCISES: CPT

## 2020-01-01 RX ADMIN — SODIUM CHLORIDE, PRESERVATIVE FREE 3 ML: 5 INJECTION INTRAVENOUS at 20:14

## 2020-01-01 RX ADMIN — ACETAMINOPHEN 1000 MG: 500 TABLET ORAL at 11:20

## 2020-01-01 RX ADMIN — CARVEDILOL 3.12 MG: 3.12 TABLET, FILM COATED ORAL at 17:25

## 2020-01-01 RX ADMIN — FAMOTIDINE 40 MG: 40 TABLET ORAL at 09:38

## 2020-01-01 RX ADMIN — SENNOSIDES AND DOCUSATE SODIUM 2 TABLET: 8.6; 5 TABLET ORAL at 20:13

## 2020-01-01 RX ADMIN — BUSPIRONE HYDROCHLORIDE 5 MG: 5 TABLET ORAL at 20:13

## 2020-01-01 RX ADMIN — ACETAMINOPHEN 1000 MG: 500 TABLET ORAL at 17:25

## 2020-01-01 RX ADMIN — FERROUS SULFATE TAB EC 324 MG (65 MG FE EQUIVALENT) 324 MG: 324 (65 FE) TABLET DELAYED RESPONSE at 09:38

## 2020-01-01 RX ADMIN — ACETAMINOPHEN 1000 MG: 500 TABLET ORAL at 09:38

## 2020-01-01 RX ADMIN — CARVEDILOL 3.12 MG: 3.12 TABLET, FILM COATED ORAL at 09:38

## 2020-01-01 RX ADMIN — BUSPIRONE HYDROCHLORIDE 5 MG: 5 TABLET ORAL at 09:38

## 2020-01-01 RX ADMIN — ASPIRIN 325 MG: 325 TABLET, DELAYED RELEASE ORAL at 09:38

## 2020-01-01 NOTE — THERAPY TREATMENT NOTE
Acute Care - Physical Therapy Treatment Note  [facility]     Patient Name: Michael Sorto  : 1955  MRN: [id number]  Today's Date: 2020  Onset of Illness/Injury or Date of Surgery: 19     Referring Physician: Dr. Lu    Admit Date: 2019    Visit Dx:    ICD-10-CM ICD-9-CM   1. Pyogenic arthritis of right knee joint, due to unspecified organism (CMS/[facility]) M00.9 711.06   2. Painful total knee replacement, initial encounter (CMS/[facility]) T84.84XA 996.77    Z96.659 V43.65     338.18   3. Impaired functional mobility, balance, gait, and endurance Z74.09 V49.89   4. Impaired mobility and ADLs Z74.09 799.89     Patient Active Problem List   Diagnosis   • Headache   • Nausea   • Nontraumatic intracerebral hemorrhage (CMS/[facility])   • Hypertension   • Morbid obesity (CMS/[facility])   • Cellulitis of left lower extremity   • Intracerebral hemorrhage (CMS/[facility])   • Chronic pain of right knee   • Painful total knee replacement, initial encounter (CMS/[facility])   • Pyogenic arthritis of right knee joint (CMS/[facility])   • Painful total knee replacement (CMS/[facility])       Therapy Treatment    Rehabilitation Treatment Summary     Row Name 20 1002 20 0844          Treatment Time/Intention    Discipline  physical therapy assistant  -TW  occupational therapy assistant  (Pended)   -LITA     Document Type  therapy note (daily note)  -TW  therapy note (daily note)  (Pended)   -LITA     Subjective Information  no complaints  -TW  complains of;swelling  (Pended)   -LITA     Mode of Treatment  physical therapy;individual therapy  -TW  occupational therapy;individual therapy  (Pended)   -LITA     Patient/Family Observations  Pt agreeable to tx.  -TW  --     Care Plan Review  --  care plan/treatment goals reviewed;current/potential barriers reviewed;patient/other agree to care plan  (Pended)   -LITA     Total Minutes, Occupational Therapy Treatment  --  55  (Pended)   -LITA     Therapy Frequency (OT Eval)  --  daily  (Pended)   -LITA      Patient Effort  good  -TW  good  (Pended)   -LITA     Existing Precautions/Restrictions  fall  -TW  fall  (Pended)   -LITA     Recorded by [TW] Abel Wheatley PTA 01/01/20 1050 [LITA] Mikayla Sauer OTA 01/01/20 1254     Row Name 01/01/20 1002 01/01/20 0844          Vital Signs    Pre Systolic BP Rehab  142  -TW  158  (Pended)   -LITA     Pre Treatment Diastolic BP  72  -TW  84  (Pended)   -LITA     Post Systolic BP Rehab  --  138  (Pended)   -LITA     Post Treatment Diastolic BP  --  72  (Pended)   -LITA     Pretreatment Heart Rate (beats/min)  80  -TW  84  (Pended)   -LITA     Intratreatment Heart Rate (beats/min)  --  115  (Pended)   -LITA     Posttreatment Heart Rate (beats/min)  88  -TW  98  (Pended)   -LITA     Pre SpO2 (%)  98  -TW  98  (Pended)   -LITA     O2 Delivery Pre Treatment  room air  -TW  room air  (Pended)   -LITA     Intra SpO2 (%)  --  99  (Pended)   -LITA     O2 Delivery Intra Treatment  --  room air  (Pended)   -LITA     Post SpO2 (%)  99  -TW  98  (Pended)   -LITA     O2 Delivery Post Treatment  --  room air  (Pended)   -LITA     Pre Patient Position  Supine  -TW  Supine  (Pended)   -LITA     Intra Patient Position  Standing  -TW  Sitting  (Pended)   -LITA     Post Patient Position  Supine  -TW  Supine  (Pended)   -LITA     Recorded by [TW] Abel Wheatley PTA 01/01/20 1057 [LITA] Mikayla Sauer OTA 01/01/20 1254     Row Name 01/01/20 1002 01/01/20 0844          Cognitive Assessment/Intervention- PT/OT    Affect/Mental Status (Cognitive)  WFL  -TW  WFL  (Pended)   -LITA     Orientation Status (Cognition)  oriented x 4  -TW  oriented x 4  (Pended)   -LITA     Follows Commands (Cognition)  follows one step commands;over 90% accuracy  -TW  follows one step commands;over 90% accuracy  (Pended)   -LITA     Safety Deficit (Cognitive)  --  mild deficit  (Pended)   -LITA     Personal Safety Interventions  fall prevention program maintained;gait belt;nonskid shoes/slippers when out of bed  -TW2  fall prevention program maintained;muscle  strengthening facilitated;supervised activity  (Pended)   -LITA     Recorded by [TW] Abel Wheatley, WADE 01/01/20 1050  [TW2] Abel Wheatley, [facility] 01/01/20 1057 [LITA] Mikayla Sauer OTA 01/01/20 1254     Row Name 01/01/20 1002             Safety Issues, Functional Mobility    Impairments Affecting Function (Mobility)  balance;endurance/activity tolerance;pain;shortness of breath;strength  -TW      Recorded by [TW] Abel Wheatley, WADE 01/01/20 1050      Row Name 01/01/20 1002 01/01/20 0844          Mobility Assessment/Intervention    Extremity Weight-bearing Status  --  right lower extremity  (Pended)   -LITA     Right Lower Extremity (Weight-bearing Status)  (S) weight-bearing as tolerated (WBAT)  -TW  weight-bearing as tolerated (WBAT)  (Pended)   -LITA     Recorded by [TW] Abel Wheatley, WADE 01/01/20 1050 [LITA] Mikayla Sauer OTA 01/01/20 1254     Row Name 01/01/20 1002 01/01/20 0844          Bed Mobility Assessment/Treatment    Bed Mobility Assessment/Treatment  --  supine-sit-supine  (Pended)   -LITA     [place] Level (Bed Mobility)  --  standby assist  (Pended)   -JB2     Rolling Left [place] (Bed Mobility)  conditional independence  -TW  --     Rolling Right [place] (Bed Mobility)  conditional independence  -TW  --     Scooting/Bridging [place] (Bed Mobility)  conditional independence  -TW2  --     Supine-Sit [place] (Bed Mobility)  conditional independence  -TW  --     Sit-Supine [place] (Bed Mobility)  conditional independence  -TW  --     Supine-Sit-Supine [place] (Bed Mobility)  --  --  (Pended)  SBA  -JB2     Bed Mobility, Safety Issues  decreased use of legs for bridging/pushing  -TW2  decreased use of legs for bridging/pushing  (Pended)   -JB2     Assistive Device (Bed Mobility)  bed rails;head of bed elevated  -TW2  bed rails  (Pended)   -JB2     Comment (Bed Mobility)  Pt able to sit EOB and veena L LE prosthetic ind with use of 3-ply to obtain good fit.   -TW  --     Recorded by [TW] Abel Wheatley, PTA 01/01/20 1057  [TW2] Abel Wheatley, PTA 01/01/20 1050 [LITA] Mikayla Sauer, EVA 01/01/20 1254  [JB2] Mikayla Sauer, EVA 01/01/20 1257     Row Name 01/01/20 1002             Transfer Assessment/Treatment    Transfer Assessment/Treatment  bed-chair transfer;chair-bed transfer;sit-stand transfer;stand-sit transfer  -TW      Recorded by [TW] Abel Wheatley, PTA 01/01/20 1050      Row Name 01/01/20 1002             Bed-Chair Transfer    Bed-Chair [place] (Transfers)  contact guard;minimum assist (75% patient effort)  -TW      Assistive Device (Bed-Chair Transfers)  walker, front-wheeled  -TW2      Recorded by [TW] Abel Wheatley, PTA 01/01/20 1057  [TW2] Abel Wheatley, PTA 01/01/20 1050      Row Name 01/01/20 1002             Chair-Bed Transfer    Chair-Bed [place] (Transfers)  contact guard;minimum assist (75% patient effort)  -TW      Assistive Device (Chair-Bed Transfers)  walker, front-wheeled  -TW2      Recorded by [TW] Abel Wheatley, PTA 01/01/20 1057  [TW2] Abel Wheatley, PTA 01/01/20 1050      Row Name 01/01/20 1002             Sit-Stand Transfer    Sit-Stand [place] (Transfers)  contact guard;minimum assist (75% patient effort)  -TW      Assistive Device (Sit-Stand Transfers)  walker, front-wheeled  -TW2      Recorded by [TW] Abel Wheatley, PTA 01/01/20 1057  [TW2] Abel Wheatley, PTA 01/01/20 1050      Row Name 01/01/20 1002             Stand-Sit Transfer    Stand-Sit [place] (Transfers)  contact guard;minimum assist (75% patient effort)  -TW      Assistive Device (Stand-Sit Transfers)  walker, front-wheeled  -TW2      Recorded by [TW] Abel Wheatley, PTA 01/01/20 1057  [TW2] Abel Wheatley, PTA 01/01/20 1050      Row Name 01/01/20 1002             Gait/Stairs Assessment/Training    Gait/Stairs Assessment/Training  gait/ambulation assistive device  -TW      [place] Level (Gait)   contact guard;minimum assist (75% patient effort)  -TW2      Assistive Device (Gait)  walker, front-wheeled  -TW      Distance in Feet (Gait)  20ft then 4ft to bed from w/c.  -TW2      Comment (Gait/Stairs)  Pt had drainage from R knee incision that soaked through pressure dressing and required to be changed after pt t/f to supine.  -TW2      Recorded by [TW] Abel Wheatley PTA 01/01/20 1050  [TW2] Abel Wheatley PTA 01/01/20 1057      Row Name 01/01/20 0844             ADL Assessment/Intervention    BADL Assessment/Intervention  --  -TW,LITA      Recorded by [TW,LITA] Abel Wheatley PTA (r) Mikayla Sauer OTA (t) 01/01/20 1302      Row Name 01/01/20 0844             BADL Safety/Performance    Impairments, BADL Safety/Performance  endurance/activity tolerance;maintains weight bearing status;pain;range of motion  (Pended)   -LITA      Recorded by [LITA] Mikayla Sauer OTA 01/01/20 1259      Row Name 01/01/20 0844             Therapeutic Exercise    Therapeutic Exercise  core strengthening  (Pended)   -LITA      Recorded by [LITA] Mikayla Sauer OTA 01/01/20 1259      Row Name 01/01/20 1002             Positioning and Restraints    Pre-Treatment Position  in bed  -TW      Post Treatment Position  bed  -TW      In Bed  supine;call light within reach;encouraged to call for assist;exit alarm on  -TW      Recorded by [TW] Abel Wheatley PTA 01/01/20 1057      Row Name 01/01/20 1002             Pain Scale: Numbers Pre/Post-Treatment    Pain Scale: Numbers, Pretreatment  2/10  -TW      Pain Scale: Numbers, Post-Treatment  3/10  -TW      Pain Location - Side  Right  -TW2      Pain Location  knee  -TW2      Recorded by [TW] Abel Wheatley PTA 01/01/20 1057  [TW2] Abel Wheatley PTA 01/01/20 1050      Row Name                Wound 12/26/19 1246 Right anterior knee Incision    Wound - Properties Group Date first assessed: 12/26/19 [AQ] Time first assessed: 1246 [AQ] Side: Right [AQ] Orientation:  anterior [AQ] Location: knee [AQ] Primary Wound Type: Incision [AQ] Recorded by:  [AQ] Nguyen Dugan RN 12/26/19 1246    Row Name 01/01/20 1002             Outcome Summary/Treatment Plan (PT)    Daily Summary of Progress (PT)  progress toward functional goals is good  -TW      Barriers to Overall Progress (PT)  drainage from R knee with gait and t/f's.  -TW      Plan for Continued Treatment (PT)  Cont  -TW      Anticipated Discharge Disposition (PT)  anticipate therapy at next level of care  -TW2      Recorded by [TW] Abel Wheatley, WADE 01/01/20 1057  [TW2] Abel Wheatley, PTA 01/01/20 1050        User Key  (r) = Recorded By, (t) = Taken By, (c) = Cosigned By    Initials Name Effective Dates Discipline    AQ Nguyen Dugan RN 10/17/16 -  Nurse    TW Abel Wheatley PTA 03/07/18 -  PT    LITA Mikayla Sauer OTA 11/05/19 -  OT          Wound 12/26/19 1246 Right anterior knee Incision (Active)   Dressing Appearance moist drainage 1/1/2020  8:39 AM   Closure [place] 1/1/2020  8:39 AM   Base pink 1/1/2020  8:39 AM   Drainage Characteristics/Odor serosanguineous 1/1/2020 10:00 AM   Drainage Amount moderate 1/1/2020 10:00 AM   Care, Wound dressing removed 1/1/2020 10:00 AM   Dressing Care, Wound dressing changed;dressing applied 1/1/2020 10:00 AM       Rehab Goal Summary     Row Name 01/01/20 1002             Bed Mobility Goal 1 (PT)    Activity/Assistive Device (Bed Mobility Goal 1, PT)  bed mobility activities, all  -TW      [place] Level/Cues Needed (Bed Mobility Goal 1, PT)  independent;conditional independence  -TW      Time Frame (Bed Mobility Goal 1, PT)  short term goal (STG);3 days  -TW      Progress/Outcomes (Bed Mobility Goal 1, PT)  goal not met  -TW         Transfer Goal 1 (PT)    Activity/Assistive Device (Transfer Goal 1, PT)  bed-to-chair/chair-to-bed;toilet  -TW      [place] Level/Cues Needed (Transfer Goal 1, PT)  conditional independence  -TW      Time Frame (Transfer Goal  1, PT)  long term goal (LTG);by discharge;10 days  -TW      Progress/Outcome (Transfer Goal 1, PT)  goal not met  -TW         Gait Training Goal 1 (PT)    Activity/Assistive Device (Gait Training Goal 1, PT)  gait (walking locomotion);assistive device use;backward stepping;decrease fall risk;sidestepping;turning, left;turning, right;forward stepping;improve balance and speed;walker, rolling  -TW      [place] Level (Gait Training Goal 1, PT)  conditional independence  -TW      Distance (Gait Goal 1, PT)  150 ft or more  -TW      Time Frame (Gait Training Goal 1, PT)  by discharge;3 weeks;long term goal (LTG)  -TW      Progress/Outcome (Gait Training Goal 1, PT)  goal not met  -TW         ROM Goal 1 (PT)    ROM Goal 1 (PT)  R knee 0-95 deg AROM  -TW      Time Frame (ROM Goal 1, PT)  long term goal (LTG);5 - 7 days  -TW      Progress/Outcome (ROM Goal 1, PT)  goal not met  -TW         Stairs Goal 1 (PT)    Activity/Assistive Device (Stairs Goal 1, PT)  ascending stairs;descending stairs;assistive device use  -TW      [place] Level/Cues Needed (Stairs Goal 1, PT)  conditional independence  -TW      Time Frame (Stairs Goal 1, PT)  by discharge;3 weeks  -TW      Progress/Outcome (Stairs Goal 1, PT)  goal not met  -TW        User Key  (r) = Recorded By, (t) = Taken By, (c) = Cosigned By    Initials Name Provider Type Discipline    TW Abel Wheatley, PTA Physical Therapy Assistant PT              PT Recommendation and Plan  Anticipated Discharge Disposition (PT): anticipate therapy at next level of care  Outcome Summary/Treatment Plan (PT)  Daily Summary of Progress (PT): progress toward functional goals is good  Barriers to Overall Progress (PT): drainage from R knee with gait and t/f's.  Plan for Continued Treatment (PT): Cont  Anticipated Discharge Disposition (PT): anticipate therapy at next level of care  Plan of Care Reviewed With: patient  Progress: improving  Outcome Summary: Pt able to t/f sup to sit  with supervision. Pt able to veena L LE prosthetic prior to standing and amb. Pt required 3-ply sock to acheive good fit with prosthetic. Pt stood with min of 1 and amb 20ft with RW and min/CGA of 1. Pt then amb back to bed with same assist as before. Pt R LE oozing blood tinged fluid from proximal incision site on R knee. Pt pressure dressing was saturated and leaking at end of gait. Nsg made aware and came to change dressing. Pt would cont to benefit from therapy upon DC.  Outcome Measures     Row Name 01/01/20 1002 12/31/19 1401 12/31/19 1059       How much help from another person do you currently need...    Turning from your back to your side while in flat bed without using bedrails?  4  -TW  4  -TW  --    Moving from lying on back to sitting on the side of a flat bed without bedrails?  4  -TW  4  -TW  --    Moving to and from a bed to a chair (including a wheelchair)?  3  -TW  2  -TW  --    Standing up from a chair using your arms (e.g., wheelchair, bedside chair)?  3  -TW  3  -TW  --    Climbing 3-5 steps with a railing?  1  -TW  1  -TW  --    To walk in hospital room?  3  -TW  2  -TW  --    AM-PAC 6 Clicks Score (PT)  18  -TW  16  -TW  --       How much help from another is currently needed...    Putting on and taking off regular lower body clothing?  --  --  1  -BB    Bathing (including washing, rinsing, and drying)  --  --  2  -BB    Toileting (which includes using toilet bed pan or urinal)  --  --  2  -BB    Putting on and taking off regular upper body clothing  --  --  3  -BB    Taking care of personal grooming (such as brushing teeth)  --  --  3  -BB    Eating meals  --  --  3  -BB    AM-PAC 6 Clicks Score (OT)  --  --  14  -BB       Functional Assessment    Outcome Measure Options  AM-PAC 6 Clicks Basic Mobility (PT)  -TW  AM-PAC 6 Clicks Basic Mobility (PT)  -TW  --    Row Name 12/30/19 1335 12/30/19 1325          How much help from another person do you currently need...    Turning from your back to  your side while in flat bed without using bedrails?  --  4  -TW     Moving from lying on back to sitting on the side of a flat bed without bedrails?  --  4  -TW     Moving to and from a bed to a chair (including a wheelchair)?  --  1  -TW     Standing up from a chair using your arms (e.g., wheelchair, bedside chair)?  --  1  -TW     Climbing 3-5 steps with a railing?  --  1  -TW     To walk in hospital room?  --  1  -TW     AM-PAC 6 Clicks Score (PT)  --  12  -TW        How much help from another is currently needed...    Putting on and taking off regular lower body clothing?  1  -LITA  --     Bathing (including washing, rinsing, and drying)  2  -LITA  --     Toileting (which includes using toilet bed pan or urinal)  2  -LITA  --     Putting on and taking off regular upper body clothing  2  -LITA  --     Taking care of personal grooming (such as brushing teeth)  2  -LITA  --     Eating meals  3  -LITA  --     AM-PAC 6 Clicks Score (OT)  12  -LITA  --        Functional Assessment    Outcome Measure Options  AM-PAC 6 Clicks Daily Activity (OT)  -LITA  AM-PAC 6 Clicks Basic Mobility (PT)  -TW       User Key  (r) = Recorded By, (t) = Taken By, (c) = Cosigned By    Initials Name Provider Type    Abel Antonio PTA Physical Therapy Assistant    Julia Ramirez COTA/L Occupational Therapy Assistant    Mikayla Orr OTA Occupational Therapy Assistant         Time Calculation:   PT Charges     Row Name 01/01/20 1057             Time Calculation    Start Time  1002  -TW      Stop Time  1041  -TW      Time Calculation (min)  39 min  -TW      PT Received On  01/01/20  -TW      PT Goal Re-Cert Due Date  01/08/20  -TW         Time Calculation- PT    Total Timed Code Minutes- PT  39 minute(s)  -TW        User Key  (r) = Recorded By, (t) = Taken By, (c) = Cosigned By    Initials Name Provider Type    Abel Antonio PTA Physical Therapy Assistant        Therapy Charges for Today     Code Description Service Date  Service Provider Modifiers Qty    47603697022 HC PT THERAPEUTIC ACT EA 15 MIN 12/31/2019 Abel Wheatley, PTA GP 1    23311887466 HC PT THER PROC EA 15 MIN 12/31/2019 Able Wheatley, PTA GP 1    38674094696 HC GAIT TRAINING EA 15 MIN 12/31/2019 Abel Wheatley, PTA GP 1    66799705199 HC PT THERAPEUTIC ACT EA 15 MIN 12/31/2019 Abel Wheatley, PTA GP 1    60198371200 HC PT THER PROC EA 15 MIN 12/31/2019 Abel Wheatley, PTA GP 1    47717780898 HC GAIT TRAINING EA 15 MIN 1/1/2020 Abel Wheatley, PTA GP 1    24708420804 HC PT THERAPEUTIC ACT EA 15 MIN 1/1/2020 Abel Wheatley, PTA GP 2          PT G-Codes  Outcome Measure Options: AM-PAC 6 Clicks Basic Mobility (PT)  AM-PAC 6 Clicks Score (PT): 18  AM-PAC 6 Clicks Score (OT): 14    Abel Wheatley PTA  1/1/2020

## 2020-01-01 NOTE — PLAN OF CARE
No change during the shift with patient, knee continues to drain- dressing changed accordingly, no other complaints. VSS.

## 2020-01-01 NOTE — THERAPY TREATMENT NOTE
Acute Care - Occupational Therapy Treatment Note  [facility]     Patient Name: Michael Sorto  : 1955  MRN: [id number]  Today's Date: 2020  Onset of Illness/Injury or Date of Surgery: 19  Date of Referral to OT: 19  Referring Physician: Dr. Lu    Admit Date: 2019       ICD-10-CM ICD-9-CM   1. Pyogenic arthritis of right knee joint, due to unspecified organism (CMS/HCC) M00.9 711.06   2. Painful total knee replacement, initial encounter (CMS/HCC) T84.84XA 996.77    Z96.659 V43.65     338.18   3. Impaired functional mobility, balance, gait, and endurance Z74.09 V49.89   4. Impaired mobility and ADLs Z74.09 799.89     Patient Active Problem List   Diagnosis   • Headache   • Nausea   • Nontraumatic intracerebral hemorrhage (CMS/HCC)   • Hypertension   • Morbid obesity (CMS/HCC)   • Cellulitis of left lower extremity   • Intracerebral hemorrhage (CMS/HCC)   • Chronic pain of right knee   • Painful total knee replacement, initial encounter (CMS/HCC)   • Pyogenic arthritis of right knee joint (CMS/HCC)   • Painful total knee replacement (CMS/HCC)     Past Medical History:   Diagnosis Date   • Aortic valvar stenosis    • Cellulitis of right lower extremity    • Chronic pain syndrome    • CVA (cerebral vascular accident) (CMS/HCC)    • Depressive disorder    • GERD (gastroesophageal reflux disease)    • Hypertension    • Injury of lower leg    • Kidney stone    • Morbid obesity (CMS/HCC)    • Painful total knee replacement, initial encounter (CMS/HCC) 3/6/2019   • Pyogenic arthritis of right knee joint (CMS/HCC) 3/6/2019   • Right knee pain      Past Surgical History:   Procedure Laterality Date   • AMPUTATION Left     BKA   • AORTIC VALVE REPAIR/REPLACEMENT     • APPENDECTOMY     • CARDIAC CATHETERIZATION     • REPLACEMENT TOTAL KNEE Right    • TONSILLECTOMY     • TOTAL KNEE  PROSTHESIS REMOVAL W/ SPACER INSERTION Right 2019    Procedure: REMOVAL OF TOTAL KNEE  COMPONENTS RIGHT KNEE WITH INSERTION OF CEMENT ANTIBIOTIC SPACER;  Surgeon: Rufino Lu MD;  Location: [facility];  Service: Orthopedics   • TOTAL KNEE ARTHROPLASTY REVISION Right 12/26/2019    Procedure: RIGHT TOTAL KNEE ARTHROPLASTY REVISION;  Surgeon: Rufino Lu MD;  Location: [facility];  Service: Orthopedics     Non-skid socks and gait belt in place. Toileting offered. Call light and needs within reach. Pt advised to not get up alone and call the nurse for assistance.  Bed alarm on.   Therapy Treatment    Rehabilitation Treatment Summary     Row Name 01/01/20 1002 01/01/20 0844          Treatment Time/Intention    Discipline  physical therapy assistant  -TW  occupational therapy assistant  -LITA     Document Type  therapy note (daily note)  -TW  therapy note (daily note)  -LITA     Subjective Information  no complaints  -TW  complains of;swelling  -LITA     Mode of Treatment  physical therapy;individual therapy  -TW  occupational therapy;individual therapy  -LITA     Patient/Family Observations  Pt agreeable to tx.  -TW  --     Care Plan Review  --  care plan/treatment goals reviewed;current/potential barriers reviewed;patient/other agree to care plan  -LITA     Total Minutes, Occupational Therapy Treatment  --  55  -LITA     Therapy Frequency (OT Eval)  --  daily  -LITA     Patient Effort  good  -TW  good  -LITA     Existing Precautions/Restrictions  fall  -TW  fall  -LITA     Recorded by [TW] Abel Wheatley PTA 01/01/20 1050 [LITA] Mikayla Sauer OTA 01/01/20 1312     Row Name 01/01/20 1002 01/01/20 0844          Vital Signs    Pre Systolic BP Rehab  142  -TW  158  -LITA     Pre Treatment Diastolic BP  72  -TW  84  -LITA     Post Systolic BP Rehab  --  138  -LITA     Post Treatment Diastolic BP  --  72  -LITA     Pretreatment Heart Rate (beats/min)  80  -TW  84  -LITA     Intratreatment Heart Rate (beats/min)  --  115  -LITA     Posttreatment Heart Rate (beats/min)  88  -TW  98  -LITA     Pre SpO2 (%)  98  -TW  98   -LITA     O2 Delivery Pre Treatment  room air  -TW  room air  -LITA     Intra SpO2 (%)  --  99  -LITA     O2 Delivery Intra Treatment  --  room air  -LTIA     Post SpO2 (%)  99  -TW  98  -LITA     O2 Delivery Post Treatment  --  room air  -LITA     Pre Patient Position  Supine  -TW  Supine  -LITA     Intra Patient Position  Standing  -TW  Sitting  -LITA     Post Patient Position  Supine  -TW  Supine  -LITA     Recorded by [TW] Abel Wheatley, WADE 01/01/20 1057 [LITA] Mikayla Sauer OTA 01/01/20 1312     Row Name 01/01/20 1002 01/01/20 0844          Cognitive Assessment/Intervention- PT/OT    Affect/Mental Status (Cognitive)  WFL  -TW  WFL  -LITA     Orientation Status (Cognition)  oriented x 4  -TW  oriented x 4  -LITA     Follows Commands (Cognition)  follows one step commands;over 90% accuracy  -TW  follows one step commands;over 90% accuracy  -LITA     Safety Deficit (Cognitive)  --  mild deficit  -LITA     Personal Safety Interventions  fall prevention program maintained;gait belt;nonskid shoes/slippers when out of bed  -TW2  fall prevention program maintained;muscle strengthening facilitated;supervised activity  -LITA     Recorded by [TW] Abel Wheatley PTA 01/01/20 1050  [TW2] Abel Wheatley PTA 01/01/20 1057 [LITA] Mikayla Sauer OTA 01/01/20 1312     Row Name 01/01/20 1002             Safety Issues, Functional Mobility    Impairments Affecting Function (Mobility)  balance;endurance/activity tolerance;pain;shortness of breath;strength  -TW      Recorded by [TW] Abel Wheatley PTA 01/01/20 1050      Row Name 01/01/20 1002 01/01/20 0844          Mobility Assessment/Intervention    Extremity Weight-bearing Status  --  right lower extremity  -LITA     Right Lower Extremity (Weight-bearing Status)  (S) weight-bearing as tolerated (WBAT)  -TW  weight-bearing as tolerated (WBAT)  -LITA     Recorded by [TW] Abel Wheatley PTA 01/01/20 1050 [LITA] Mikayla Sauer OTA 01/01/20 1312     Row Name 01/01/20 1002 01/01/20 0844           Bed Mobility Assessment/Treatment    Bed Mobility Assessment/Treatment  --  supine-sit-supine  -LITA     [place] Level (Bed Mobility)  --  standby assist  -LITA     Rolling Left [place] (Bed Mobility)  conditional independence  -TW  --     Rolling Right [place] (Bed Mobility)  conditional independence  -TW  --     Scooting/Bridging [place] (Bed Mobility)  conditional independence  -TW2  --     Supine-Sit [place] (Bed Mobility)  conditional independence  -TW  --     Sit-Supine [place] (Bed Mobility)  conditional independence  -TW  --     Supine-Sit-Supine [place] (Bed Mobility)  --  -- SBA  -LITA     Bed Mobility, Safety Issues  decreased use of legs for bridging/pushing  -TW2  decreased use of legs for bridging/pushing  -LITA     Assistive Device (Bed Mobility)  bed rails;head of bed elevated  -TW2  bed rails  -LITA     Comment (Bed Mobility)  Pt able to sit EOB and veena L LE prosthetic ind with use of 3-ply to obtain good fit.  -TW  --     Recorded by [TW] Abel Wheatley PTA 01/01/20 1057  [TW2] Abel Wheatley, [facility] 01/01/20 1050 [LITA] Mikayla Sauer OTA 01/01/20 1312     Row Name 01/01/20 1002             Transfer Assessment/Treatment    Transfer Assessment/Treatment  bed-chair transfer;chair-bed transfer;sit-stand transfer;stand-sit transfer  -TW      Recorded by [TW] Abel Wheatley, WADE 01/01/20 1050      Row Name 01/01/20 1002             Bed-Chair Transfer    Bed-Chair [place] (Transfers)  contact guard;minimum assist (75% patient effort)  -TW      Assistive Device (Bed-Chair Transfers)  walker, front-wheeled  -TW2      Recorded by [TW] Abel Wheatley PTA 01/01/20 1057  [TW2] Abel Wheatley, PTA 01/01/20 1050      Row Name 01/01/20 1002             Chair-Bed Transfer    Chair-Bed [place] (Transfers)  contact guard;minimum assist (75% patient effort)  -TW      Assistive Device (Chair-Bed Transfers)  walker, front-wheeled  -TW2      Recorded by  [TW] Abel Wheatley, PTA 01/01/20 1057  [TW2] Abel Wheatley, PTA 01/01/20 1050      Row Name 01/01/20 1002             Sit-Stand Transfer    Sit-Stand [place] (Transfers)  contact guard;minimum assist (75% patient effort)  -TW      Assistive Device (Sit-Stand Transfers)  walker, front-wheeled  -TW2      Recorded by [TW] Abel Wheatley, PTA 01/01/20 1057  [TW2] Abel Wheatley, PTA 01/01/20 1050      Row Name 01/01/20 1002             Stand-Sit Transfer    Stand-Sit [place] (Transfers)  contact guard;minimum assist (75% patient effort)  -TW      Assistive Device (Stand-Sit Transfers)  walker, front-wheeled  -TW2      Recorded by [TW] Abel Wheatley, PTA 01/01/20 1057  [TW2] Abel Wheatley, PTA 01/01/20 1050      Row Name 01/01/20 1002             Gait/Stairs Assessment/Training    Gait/Stairs Assessment/Training  gait/ambulation assistive device  -TW      [place] Level (Gait)  contact guard;minimum assist (75% patient effort)  -TW2      Assistive Device (Gait)  walker, front-wheeled  -TW      Distance in Feet (Gait)  20ft then 4ft to bed from w/c.  -TW2      Comment (Gait/Stairs)  Pt had drainage from R knee incision that soaked through pressure dressing and required to be changed after pt t/f to supine.  -TW2      Recorded by [TW] Abel Wheatley PTA 01/01/20 1050  [TW2] Abel Wheatley, PTA 01/01/20 1057      Row Name 01/01/20 0844             ADL Assessment/Intervention    BADL Assessment/Intervention  --  -TW,LITA      Recorded by [TW,LITA] Abel Wheatley, PTA (r) Mikayla Sauer OTA (t) 01/01/20 1302      Row Name 01/01/20 0844             Self-Feeding Assessment/Training    [place] Level (Feeding)  feeding skills;set up;independent  -LITA      Comment (Feeding)  for dynamic sitting ax  -LITA      Recorded by [LITA] Mikayla Sauer OTA 01/01/20 1314      Row Name 01/01/20 0844             BADL Safety/Performance    Impairments, BADL Safety/Performance   endurance/activity tolerance;maintains weight bearing status;pain;range of motion  -LITA      Recorded by [LITA] Mikayla Sauer OTA 01/01/20 1312      Row Name 01/01/20 0844             Therapeutic Exercise    Therapeutic Exercise  --  -LITA      Recorded by [LITA] Mikayla Sauer OTA 01/01/20 1312      Row Name 01/01/20 0844             Therapeutic Exercise    Upper Extremity (Therapeutic Exercise)  wand exercises  -LITA      Upper Extremity Range of Motion (Therapeutic Exercise)  shoulder flexion/extension, bilateral;shoulder horizontal abduction/adduction, bilateral rowing  -LITA      Weight/Resistance (Therapeutic Exercise)  2 pounds  -LITA      Exercise Type (Therapeutic Exercise)  AROM (active range of motion)  -LITA      Position (Therapeutic Exercise)  seated  -LITA      Sets/Reps (Therapeutic Exercise)  20x1  -LITA      Equipment (Therapeutic Exercise)  dowel margaret/wand  -LITA      Expected Outcome (Therapeutic Exercise)  improve functional tolerance, self-care activity;improve performance, BADLs;improve performance, transfer skills  -LITA      Recorded by [LITA] Mikayla Sauer OTA 01/01/20 1312      Row Name 01/01/20 0844             Dynamic Sitting Balance    Level of [place], Reaches Outside Midline (Sitting, Dynamic Balance)  supervision  -LITA      Sitting Position, Reaches Outside Midline (Sitting, Dynamic Balance)  sitting on edge of bed  -LITA      Level of [place], Resists Mild Perturbations (Sitting, Dynamic Balance)  supervision  -LITA      Recorded by [LITA] Mikayla Sauer OTA 01/01/20 1312      Row Name 01/01/20 1002 01/01/20 0844          Positioning and Restraints    Pre-Treatment Position  in bed  -TW  in bed  -LITA     Post Treatment Position  bed  -TW  bed  -LITA     In Bed  supine;call light within reach;encouraged to call for assist;exit alarm on  -TW  call light within reach;encouraged to call for assist;exit alarm on;side rails up x2  -LITA     Recorded by [TW] Abel Wheatley, PTA 01/01/20 1057 [LITA] Camacho  EVA Degroot 01/01/20 1312     Row Name 01/01/20 1002 01/01/20 0844          Pain Scale: Numbers Pre/Post-Treatment    Pain Scale: Numbers, Pretreatment  2/10  -TW  4/10  -LITA     Pain Scale: Numbers, Post-Treatment  3/10  -TW  4/10  -LITA     Pain Location - Side  Right  -TW2  Right  -LITA     Pain Location - Orientation  --  lower  -LITA     Pain Location  knee  -TW2  knee  -LITA     Pain Intervention(s)  --  -- nsg notified  -LITA     Recorded by [TW] Abel Wheatley, PTA 01/01/20 1057  [TW2] Abel Wheatley, PTA 01/01/20 1050 [LITA] Mikayla Sauer OTA 01/01/20 1312     Row Name                Wound 12/26/19 1246 Right anterior knee Incision    Wound - Properties Group Date first assessed: 12/26/19 [AQ] Time first assessed: 1246 [AQ] Side: Right [AQ] Orientation: anterior [AQ] Location: knee [AQ] Primary Wound Type: Incision [AQ] Recorded by:  [AQ] Nguyen Dugan RN 12/26/19 1246    Row Name 01/01/20 0844             Coping    Observed Emotional State  accepting;attention-seeking behavior  -LITA      Verbalized Emotional State  acceptance  -LITA      Recorded by [LITA] Mikayla Sauer OTA 01/01/20 1312      Row Name 01/01/20 0844             Plan of Care Review    Plan of Care Reviewed With  patient  -LITA      Recorded by [LITA] Mikayla Sauer OTA 01/01/20 1312      Row Name 01/01/20 0844             Outcome Summary/Treatment Plan (OT)    Daily Summary of Progress (OT)  progress toward functional goals is good  -LITA      Anticipated Discharge Disposition (OT)  anticipate therapy at next level of care  -LITA      Recorded by [LITA] Mikayla Sauer OTA 01/01/20 1312      Row Name 01/01/20 1002             Outcome Summary/Treatment Plan (PT)    Daily Summary of Progress (PT)  progress toward functional goals is good  -TW      Barriers to Overall Progress (PT)  drainage from R knee with gait and t/f's.  -TW      Plan for Continued Treatment (PT)  Cont  -TW      Anticipated Discharge Disposition (PT)  anticipate therapy at next  level of care  -TW2      Recorded by [TW] Abel Wheatley, PTA 01/01/20 1057  [TW2] Abel Wheatley, PTA 01/01/20 1050        User Key  (r) = Recorded By, (t) = Taken By, (c) = Cosigned By    Initials Name Effective Dates Discipline    AQ Nguyen Dugan RN 10/17/16 -  Nurse    TW Abel Wheatley, PTA 03/07/18 -  PT    Mikayla Orr OTA 11/05/19 -  OT        Wound 12/26/19 1246 Right anterior knee Incision (Active)   Dressing Appearance moist drainage 1/1/2020  8:39 AM   Closure [place] 1/1/2020  8:39 AM   Base pink 1/1/2020  8:39 AM   Drainage Characteristics/Odor serosanguineous 1/1/2020 10:00 AM   Drainage Amount moderate 1/1/2020 10:00 AM   Care, Wound dressing removed 1/1/2020 10:00 AM   Dressing Care, Wound dressing changed;dressing applied 1/1/2020 10:00 AM     Rehab Goal Summary     Row Name 01/01/20 1002 01/01/20 0844          Bed Mobility Goal 1 (PT)    Activity/Assistive Device (Bed Mobility Goal 1, PT)  bed mobility activities, all  -TW  --     [place] Level/Cues Needed (Bed Mobility Goal 1, PT)  independent;conditional independence  -TW  --     Time Frame (Bed Mobility Goal 1, PT)  short term goal (STG);3 days  -TW  --     Progress/Outcomes (Bed Mobility Goal 1, PT)  goal not met  -TW  --        Transfer Goal 1 (PT)    Activity/Assistive Device (Transfer Goal 1, PT)  bed-to-chair/chair-to-bed;toilet  -TW  --     [place] Level/Cues Needed (Transfer Goal 1, PT)  conditional independence  -TW  --     Time Frame (Transfer Goal 1, PT)  long term goal (LTG);by discharge;10 days  -TW  --     Progress/Outcome (Transfer Goal 1, PT)  goal not met  -TW  --        Gait Training Goal 1 (PT)    Activity/Assistive Device (Gait Training Goal 1, PT)  gait (walking locomotion);assistive device use;backward stepping;decrease fall risk;sidestepping;turning, left;turning, right;forward stepping;improve balance and speed;walker, rolling  -TW  --     [place] Level (Gait Training Goal 1, PT)   conditional independence  -TW  --     Distance (Gait Goal 1, PT)  150 ft or more  -TW  --     Time Frame (Gait Training Goal 1, PT)  by discharge;3 weeks;long term goal (LTG)  -TW  --     Progress/Outcome (Gait Training Goal 1, PT)  goal not met  -TW  --        ROM Goal 1 (PT)    ROM Goal 1 (PT)  R knee 0-95 deg AROM  -TW  --     Time Frame (ROM Goal 1, PT)  long term goal (LTG);5 - 7 days  -TW  --     Progress/Outcome (ROM Goal 1, PT)  goal not met  -TW  --        Stairs Goal 1 (PT)    Activity/Assistive Device (Stairs Goal 1, PT)  ascending stairs;descending stairs;assistive device use  -TW  --     [place] Level/Cues Needed (Stairs Goal 1, PT)  conditional independence  -TW  --     Time Frame (Stairs Goal 1, PT)  by discharge;3 weeks  -TW  --     Progress/Outcome (Stairs Goal 1, PT)  goal not met  -TW  --        Occupational Therapy Goals    Transfer Goal Selection (OT)  --  transfer, OT goal 1  -LITA     Dressing Goal Selection (OT)  --  dressing, OT goal 1  -LITA     Balance Goal Selection (OT)  --  balance, OT goal 1  -LITA     Endurance Goal Selection (OT)  --  endurance, OT goal 1  -LITA        Transfer Goal 1 (OT)    Activity/Assistive Device (Transfer Goal 1, OT)  --  toilet;commode, bedside with drop arms;commode, bedside without drop arms  -LITA     [place] Level/Cues Needed (Transfer Goal 1, OT)  --  moderate assist (50-74% patient effort)  -LITA     Time Frame (Transfer Goal 1, OT)  --  long term goal (LTG);by discharge  -LITA     Progress/Outcome (Transfer Goal 1, OT)  --  goal not met  -LITA        Dressing Goal 1 (OT)    Activity/Assistive Device (Dressing Goal 1, OT)  --  dressing skills, all  -LITA     [place]/Cues Needed (Dressing Goal 1, OT)  --  minimum assist (75% or more patient effort)  -LITA     Time Frame (Dressing Goal 1, OT)  --  long term goal (LTG);by discharge  -LITA     Progress/Outcome (Dressing Goal 1, OT)  --  goal not met  -LITA        Balance Goal 1 (OT)    Activity/Assistive Device  (Balance Goal 1, OT)  --  sitting, dynamic 30 min no LOB and good safety  -LITA     [place] Level/Cues Needed (Balance Goal 1, OT)  --  supervision required  -LITA     Time Frame (Balance Goal 1, OT)  --  long term goal (LTG);by discharge  -LITA     Progress/Outcomes (Balance Goal 1, OT)  --  goal met  -LITA         Endurance Goal 1 (OT)    Activity Level (Endurance Goal 1, OT)  --  endurance 2 fair + 25 min functional task 3 or less rest breaks.   -LITA     Progress/Outcome (Endurance Goal 1, OT)  --  goal met  -LITA       User Key  (r) = Recorded By, (t) = Taken By, (c) = Cosigned By    Initials Name Provider Type Discipline    TW Abel Wheatley, WADE Physical Therapy Assistant PT    Mikayla Orr OTA Occupational Therapy Assistant OT            OT Recommendation and Plan  Outcome Summary/Treatment Plan (OT)  Daily Summary of Progress (OT): progress toward functional goals is good  Anticipated Discharge Disposition (OT): anticipate therapy at next level of care  Therapy Frequency (OT Eval): daily  Daily Summary of Progress (OT): progress toward functional goals is good  Plan of Care Review  Plan of Care Reviewed With: patient  Plan of Care Reviewed With: patient  Outcome Summary: Pt agreed to tx on this date. Pt performed bed mobility to include sup<>sit<>sup with SBA. Pt sat EOB about 40 min and performed self feeding for dynamic sitting balance requiring SBA and no LOB noted. Pt min A to veena sock with use of sock aid. Pt donned prosthetic requiring SBA. Pt performed ther ex with 2 lb wand to include rowing, shoulder flex/ex, and shoulder horizontal abb/abd. Post tx pt pressure dressing was saturate with blood and nsg notified.  Outcome Measures     Row Name 01/01/20 1002 01/01/20 0844 12/31/19 1401       How much help from another person do you currently need...    Turning from your back to your side while in flat bed without using bedrails?  4  -TW  --  4  -TW    Moving from lying on back to sitting on the  side of a flat bed without bedrails?  4  -TW  --  4  -TW    Moving to and from a bed to a chair (including a wheelchair)?  3  -TW  --  2  -TW    Standing up from a chair using your arms (e.g., wheelchair, bedside chair)?  3  -TW  --  3  -TW    Climbing 3-5 steps with a railing?  1  -TW  --  1  -TW    To walk in hospital room?  3  -TW  --  2  -TW    AM-PAC 6 Clicks Score (PT)  18  -TW  --  16  -TW       How much help from another is currently needed...    Putting on and taking off regular lower body clothing?  --  1  -LITA  --    Bathing (including washing, rinsing, and drying)  --  2  -LITA  --    Toileting (which includes using toilet bed pan or urinal)  --  2  -LITA  --    Putting on and taking off regular upper body clothing  --  3  -LITA  --    Taking care of personal grooming (such as brushing teeth)  --  3  -LITA  --    Eating meals  --  3  -LITA  --    AM-PAC 6 Clicks Score (OT)  --  14  -LITA  --       Functional Assessment    Outcome Measure Options  AM-PAC 6 Clicks Basic Mobility (PT)  -TW  --  AM-PAC 6 Clicks Basic Mobility (PT)  -TW    Row Name 12/31/19 1059 12/30/19 1335 12/30/19 1325       How much help from another person do you currently need...    Turning from your back to your side while in flat bed without using bedrails?  --  --  4  -TW    Moving from lying on back to sitting on the side of a flat bed without bedrails?  --  --  4  -TW    Moving to and from a bed to a chair (including a wheelchair)?  --  --  1  -TW    Standing up from a chair using your arms (e.g., wheelchair, bedside chair)?  --  --  1  -TW    Climbing 3-5 steps with a railing?  --  --  1  -TW    To walk in hospital room?  --  --  1  -TW    AM-PAC 6 Clicks Score (PT)  --  --  12  -TW       How much help from another is currently needed...    Putting on and taking off regular lower body clothing?  1  -BB  1  -LITA  --    Bathing (including washing, rinsing, and drying)  2  -BB  2  -LITA  --    Toileting (which includes using toilet bed pan or  urinal)  2  -BB  2  -LITA  --    Putting on and taking off regular upper body clothing  3  -BB  2  -LITA  --    Taking care of personal grooming (such as brushing teeth)  3  -BB  2  -LITA  --    Eating meals  3  -BB  3  -LITA  --    AM-PAC 6 Clicks Score (OT)  14  -BB  12  -LITA  --       Functional Assessment    Outcome Measure Options  --  AM-PAC 6 Clicks Daily Activity (OT)  -LITA  AM-PAC 6 Clicks Basic Mobility (PT)  -TW      User Key  (r) = Recorded By, (t) = Taken By, (c) = Cosigned By    Initials Name Provider Type    TW Abel Wheatley, WADE Physical Therapy Assistant    BB Jluia Meyer COTA/L Occupational Therapy Assistant    Mikayla Orr OTA Occupational Therapy Assistant           Time Calculation:     Therapy Charges for Today     Code Description Service Date Service Provider Modifiers Qty    70146165331 HC OT THER SUPP EA 15 MIN 1/1/2020 Mikayla Sauer OTA GO 2    86311184043 HC OT THERAPEUTIC ACT EA 15 MIN 1/1/2020 Mikayla Sauer OTA GO 1    01217127192 HC OT THER PROC EA 15 MIN 1/1/2020 Mikayla Sauer OTA GO 1               EVA Harris  1/1/2020

## 2020-01-01 NOTE — PLAN OF CARE
Problem: Patient Care Overview  Goal: Plan of Care Review  Flowsheets (Taken 1/1/2020 1320)  Outcome Summary: Pt agreed to tx on this date. Pt performed bed mobility to include sup<>sit<>sup with SBA. Pt sat EOB about 40 min and performed self feeding for dynamic sitting balance requiring SBA and no LOB noted. Pt min A to veena sock with use of sock aid. Pt donned prosthetic requiring SBA. Pt performed ther ex 20x1 with 2 lb wand to include rowing, shoulder flex/ex, and shoulder horizontal abb/abd. Post tx pt pressure dressing was saturate with blood and nsg notified.

## 2020-01-01 NOTE — THERAPY TREATMENT NOTE
Acute Care - Occupational Therapy Treatment Note  [facility]     Patient Name: Michael Sorto  : 1955  MRN: [id number]  Today's Date: 2020  Onset of Illness/Injury or Date of Surgery: 19  Date of Referral to OT: 19  Referring Physician: Dr. Lu    Admit Date: 2019       ICD-10-CM ICD-9-CM   1. Pyogenic arthritis of right knee joint, due to unspecified organism (CMS/HCC) M00.9 711.06   2. Painful total knee replacement, initial encounter (CMS/HCC) T84.84XA 996.77    Z96.659 V43.65     338.18   3. Impaired functional mobility, balance, gait, and endurance Z74.09 V49.89   4. Impaired mobility and ADLs Z74.09 799.89     Patient Active Problem List   Diagnosis   • Headache   • Nausea   • Nontraumatic intracerebral hemorrhage (CMS/HCC)   • Hypertension   • Morbid obesity (CMS/HCC)   • Cellulitis of left lower extremity   • Intracerebral hemorrhage (CMS/HCC)   • Chronic pain of right knee   • Painful total knee replacement, initial encounter (CMS/HCC)   • Pyogenic arthritis of right knee joint (CMS/HCC)   • Painful total knee replacement (CMS/HCC)     Past Medical History:   Diagnosis Date   • Aortic valvar stenosis    • Cellulitis of right lower extremity    • Chronic pain syndrome    • CVA (cerebral vascular accident) (CMS/HCC)    • Depressive disorder    • GERD (gastroesophageal reflux disease)    • Hypertension    • Injury of lower leg    • Kidney stone    • Morbid obesity (CMS/HCC)    • Painful total knee replacement, initial encounter (CMS/HCC) 3/6/2019   • Pyogenic arthritis of right knee joint (CMS/HCC) 3/6/2019   • Right knee pain      Past Surgical History:   Procedure Laterality Date   • AMPUTATION Left     BKA   • AORTIC VALVE REPAIR/REPLACEMENT     • APPENDECTOMY     • CARDIAC CATHETERIZATION     • REPLACEMENT TOTAL KNEE Right    • TONSILLECTOMY     • TOTAL KNEE  PROSTHESIS REMOVAL W/ SPACER INSERTION Right 2019    Procedure: REMOVAL OF TOTAL KNEE  COMPONENTS RIGHT KNEE WITH INSERTION OF CEMENT ANTIBIOTIC SPACER;  Surgeon: Rufino Lu MD;  Location: [facility];  Service: Orthopedics   • TOTAL KNEE ARTHROPLASTY REVISION Right 12/26/2019    Procedure: RIGHT TOTAL KNEE ARTHROPLASTY REVISION;  Surgeon: Rufino Lu MD;  Location: [facility];  Service: Orthopedics   Non-skid socks and gait belt in place. Toileting offered. Call light and needs within reach. Pt advised to not get up alone and call the nurse for assistance.  Bed alarm on.     Therapy Treatment    Rehabilitation Treatment Summary     Row Name 01/01/20 1002 01/01/20 0844 01/01/20 0843       Treatment Time/Intention    Discipline  physical therapy assistant  -TW  occupational therapy assistant  -LITA  occupational therapy assistant  -TO    Document Type  therapy note (daily note)  -TW  therapy note (daily note)  -LITA  therapy note (daily note)  -TO    Subjective Information  no complaints  -TW  complains of;swelling  -LITA  --    Mode of Treatment  physical therapy;individual therapy  -TW  --  occupational therapy;co-treatment  -TO    Patient/Family Observations  Pt agreeable to tx.  -TW  --  --    Care Plan Review  --  care plan/treatment goals reviewed;current/potential barriers reviewed;patient/other agree to care plan  -LITA  --    Total Minutes, Occupational Therapy Treatment  --  55  -LITA  --    Therapy Frequency (OT Eval)  --  daily  -LITA  daily  -TO    Patient Effort  good  -TW  good  -LITA  good  -TO    Existing Precautions/Restrictions  fall  -TW  fall  -LITA  fall  -TO    Recorded by [TW] Abel Wheatley PTA 01/01/20 1050 [LITA] Mikayla Sauer OTA 01/01/20 1312 [TO] Talon Cavanaugh COTA/L 01/01/20 1350    Row Name 01/01/20 1002 01/01/20 0844 01/01/20 0843       Vital Signs    Pre Systolic BP Rehab  142  -TW  158  -LITA  158  -TO    Pre Treatment Diastolic BP  72  -TW  84  -LITA  84  -TO    Post Systolic BP Rehab  --  138  -LITA  138  -TO    Post Treatment Diastolic BP  --  72  -LITA  72  -TO     Pretreatment Heart Rate (beats/min)  80  -TW  84  -LITA  84  -TO    Intratreatment Heart Rate (beats/min)  --  115  -LITA  115  -TO    Posttreatment Heart Rate (beats/min)  88  -TW  98  -LITA  98  -TO    Pre SpO2 (%)  98  -TW  98  -LITA  98  -TO    O2 Delivery Pre Treatment  room air  -TW  room air  -LITA  room air  -TO    Intra SpO2 (%)  --  99  -LITA  99  -TO    O2 Delivery Intra Treatment  --  room air  -LITA  room air  -TO    Post SpO2 (%)  99  -TW  98  -LITA  98  -TO    O2 Delivery Post Treatment  --  room air  -LITA  room air  -TO    Pre Patient Position  Supine  -TW  Supine  -LITA  Supine  -TO    Intra Patient Position  Standing  -TW  Sitting  -LITA  Sitting  -TO    Post Patient Position  Supine  -TW  Supine  -LITA  Supine  -TO    Recorded by [TW] Abel Wheatley, WADE 01/01/20 1057 [LITA] Mikayla Sauer OTA 01/01/20 1312 [TO] Talon Cavanaugh COTA/L 01/01/20 1350    Row Name 01/01/20 1002 01/01/20 0844 01/01/20 0843       Cognitive Assessment/Intervention- PT/OT    Affect/Mental Status (Cognitive)  WFL  -TW  WFL  -LITA  WFL  -TO    Orientation Status (Cognition)  oriented x 4  -TW  oriented x 4  -LITA  oriented x 4  -TO    Follows Commands (Cognition)  follows one step commands;over 90% accuracy  -TW  follows one step commands;over 90% accuracy  -LITA  follows one step commands  -TO    Safety Deficit (Cognitive)  --  mild deficit  -LITA  mild deficit  -TO    Personal Safety Interventions  fall prevention program maintained;gait belt;nonskid shoes/slippers when out of bed  -TW2  fall prevention program maintained;muscle strengthening facilitated;supervised activity  -LITA  --    Recorded by [TW] Abel Wheatley, WADE 01/01/20 1050  [TW2] Abel Wheatley PTA 01/01/20 1057 [LITA] Mikayla Sauer OTA 01/01/20 1312 [TO] Talon Cavanaugh COTA/L 01/01/20 1350    Row Name 01/01/20 1002             Safety Issues, Functional Mobility    Impairments Affecting Function (Mobility)  balance;endurance/activity tolerance;pain;shortness of  breath;strength  -TW      Recorded by [TW] Abel Wheatley PTA 01/01/20 1050      Row Name 01/01/20 1002 01/01/20 0844 01/01/20 0843       Mobility Assessment/Intervention    Extremity Weight-bearing Status  --  right lower extremity  -LITA  right lower extremity  -TO    Right Lower Extremity (Weight-bearing Status)  (S) weight-bearing as tolerated (WBAT)  -TW  weight-bearing as tolerated (WBAT)  -LITA  weight-bearing as tolerated (WBAT)  -TO    Recorded by [TW] Abel Wheatley, WADE 01/01/20 1050 [LITA] Mikayla Sauer OTA 01/01/20 1312 [TO] Talon Cavanaugh COTA/L 01/01/20 1350    Row Name 01/01/20 1002 01/01/20 0844 01/01/20 0843       Bed Mobility Assessment/Treatment    Bed Mobility Assessment/Treatment  --  supine-sit-supine  -LITA  supine-sit-supine  -TO    [place] Level (Bed Mobility)  --  standby assist  -LITA  standby assist  -TO    Rolling Left [place] (Bed Mobility)  conditional independence  -TW  --  --    Rolling Right [place] (Bed Mobility)  conditional independence  -TW  --  --    Scooting/Bridging [place] (Bed Mobility)  conditional independence  -TW2  --  --    Supine-Sit [place] (Bed Mobility)  conditional independence  -TW  --  --    Sit-Supine [place] (Bed Mobility)  conditional independence  -TW  --  --    Supine-Sit-Supine [place] (Bed Mobility)  --  -- SBA  -LITA  -- SBA  -TO    Bed Mobility, Safety Issues  decreased use of legs for bridging/pushing  -TW2  decreased use of legs for bridging/pushing  -LITA  decreased use of legs for bridging/pushing  -TO    Assistive Device (Bed Mobility)  bed rails;head of bed elevated  -TW2  bed rails  -LITA  bed rails  -TO    Comment (Bed Mobility)  Pt able to sit EOB and veena L LE prosthetic ind with use of 3-ply to obtain good fit.  -TW  --  --    Recorded by [TW] Abel Wheatley, WADE 01/01/20 1057  [TW2] Abel Wheatley, WADE 01/01/20 1050 [LITA] Mikayla Sauer OTA 01/01/20 1312 [TO] Talon Cavanaugh LAZARA/L 01/01/20 5162     Row Name 01/01/20 1002             Transfer Assessment/Treatment    Transfer Assessment/Treatment  bed-chair transfer;chair-bed transfer;sit-stand transfer;stand-sit transfer  -TW      Recorded by [TW] Abel Wheatley, PTA 01/01/20 1050      Row Name 01/01/20 1002             Bed-Chair Transfer    Bed-Chair [place] (Transfers)  contact guard;minimum assist (75% patient effort)  -TW      Assistive Device (Bed-Chair Transfers)  walker, front-wheeled  -TW2      Recorded by [TW] Abel Wheatley, PTA 01/01/20 1057  [TW2] Abel Wheatley, PTA 01/01/20 1050      Row Name 01/01/20 1002             Chair-Bed Transfer    Chair-Bed [place] (Transfers)  contact guard;minimum assist (75% patient effort)  -TW      Assistive Device (Chair-Bed Transfers)  walker, front-wheeled  -TW2      Recorded by [TW] Abel Wheatley, PTA 01/01/20 1057  [TW2] Abel Wheatley, PTA 01/01/20 1050      Row Name 01/01/20 1002             Sit-Stand Transfer    Sit-Stand [place] (Transfers)  contact guard;minimum assist (75% patient effort)  -TW      Assistive Device (Sit-Stand Transfers)  walker, front-wheeled  -TW2      Recorded by [TW] Abel Wheatley, PTA 01/01/20 1057  [TW2] Abel Wheatley, PTA 01/01/20 1050      Row Name 01/01/20 1002             Stand-Sit Transfer    Stand-Sit [place] (Transfers)  contact guard;minimum assist (75% patient effort)  -TW      Assistive Device (Stand-Sit Transfers)  walker, front-wheeled  -TW2      Recorded by [TW] Abel Wheatley, PTA 01/01/20 1057  [TW2] Abel Wheatley, PTA 01/01/20 1050      Row Name 01/01/20 1002             Gait/Stairs Assessment/Training    Gait/Stairs Assessment/Training  gait/ambulation assistive device  -TW      [place] Level (Gait)  contact guard;minimum assist (75% patient effort)  -TW2      Assistive Device (Gait)  walker, front-wheeled  -TW      Distance in Feet (Gait)  20ft then 4ft to bed from w/c.  -TW2      Comment  (Gait/Stairs)  Pt had drainage from R knee incision that soaked through pressure dressing and required to be changed after pt t/f to supine.  -TW2      Recorded by [TW] Abel Wheatley PTA 01/01/20 1050  [TW2] Abel Wheatley PTA 01/01/20 1057      Row Name 01/01/20 0844 01/01/20 0843          ADL Assessment/Intervention    BADL Assessment/Intervention  --  -TW,LITA  lower body dressing  -TO     Recorded by [TW,LITA] Abel Wheatley, PTA (r) Mikayla Sauer OTA (t) 01/01/20 1302 [TO] Talon Cavanaugh HAILE/L 01/01/20 1355     Row Name 01/01/20 0843             Lower Body Dressing Assessment/Training    Lower Body Dressing [place] Level  lower body dressing skills;don;socks;moderate assist (50% patient effort) edu on sock aide.   -TO      Recorded by [TO] Talon Cavanaugh HAILE/L 01/01/20 1355      Row Name 01/01/20 0844 01/01/20 0843          Self-Feeding Assessment/Training    [place] Level (Feeding)  feeding skills;set up;independent  -LITA  feeding skills;set up;independent  -TO     Comment (Feeding)  for dynamic sitting ax  -LITA  during EOB siting  -TO     Recorded by [LITA] Mikayla Sauer OTA 01/01/20 1314 [TO] Talon Cavanaugh HAILE/L 01/01/20 1350     Row Name 01/01/20 0844 01/01/20 0843          BADL Safety/Performance    Impairments, BADL Safety/Performance  endurance/activity tolerance;maintains weight bearing status;pain;range of motion  -LITA  endurance/activity tolerance  -TO     Recorded by [LITA] Mikayla Sauer OTA 01/01/20 1312 [TO] Talon Cavanaugh HAILE/L 01/01/20 1350     Row Name 01/01/20 0844             Therapeutic Exercise    Therapeutic Exercise  --  -LITA      Recorded by [LITA] Mikayla Sauer OTA 01/01/20 1312      Row Name 01/01/20 0844 01/01/20 0843          Therapeutic Exercise    Upper Extremity (Therapeutic Exercise)  wand exercises  -LITA  wand exercises  -TO     Upper Extremity Range of Motion (Therapeutic Exercise)  shoulder flexion/extension, bilateral;shoulder horizontal  abduction/adduction, bilateral rowing  -LITA  shoulder flexion/extension, bilateral;shoulder horizontal abduction/adduction, bilateral rowing;diagonals during dynamic sitting  -TO     Weight/Resistance (Therapeutic Exercise)  2 pounds  -LITA  2 pounds  -TO     Exercise Type (Therapeutic Exercise)  AROM (active range of motion)  -LITA  AAROM (active assistive range of motion)  -TO     Position (Therapeutic Exercise)  seated  -LITA  --     Sets/Reps (Therapeutic Exercise)  20x1  -LITA  20x1  -TO     Equipment (Therapeutic Exercise)  dowel margaret/wand  -LITA  --     Expected Outcome (Therapeutic Exercise)  improve functional tolerance, self-care activity;improve performance, BADLs;improve performance, transfer skills  -LITA  improve functional tolerance, self-care activity;improve performance, transfer skills  -TO     Recorded by [LITA] Mikayla Sauer OTA 01/01/20 1312 [TO] Talon Cavanaugh COTA/L 01/01/20 1350     Row Name 01/01/20 0843             Balance    Balance  dynamic sitting balance;static sitting balance  -TO      Recorded by [TO] Talon Cavanaugh COTA/L 01/01/20 1350      Row Name 01/01/20 0843             Static Sitting Balance    Level of [place] (Unsupported Sitting, Static Balance)  independent  -TO      Sitting Position (Unsupported Sitting, Static Balance)  sitting on edge of bed  -TO      Time Able to Maintain Position (Unsupported Sitting, Static Balance)  more than 5 minutes  -TO      Recorded by [TO] Talon Cavanaugh COTA/L 01/01/20 1350      Row Name 01/01/20 0844 01/01/20 0843          Dynamic Sitting Balance    Level of [place], Reaches Outside Midline (Sitting, Dynamic Balance)  supervision  -LITA  supervision  -TO     Sitting Position, Reaches Outside Midline (Sitting, Dynamic Balance)  sitting on edge of bed  -LITA  sitting on edge of bed  -TO     Comment, Reaches Outside Midline (Sitting, Dynamic Balance)  --  B UE ther ex  -TO     Level of [place], Resists Mild Perturbations (Sitting, Dynamic  Balance)  supervision  -LITA  supervision  -TO     Recorded by [LITA] Mikayla Sauer OTA 01/01/20 1312 [TO] Talon Cavanaugh COTA/L 01/01/20 1350     Row Name 01/01/20 1002 01/01/20 0844 01/01/20 0843       Positioning and Restraints    Pre-Treatment Position  in bed  -TW  in bed  -LITA  in bed  -TO    Post Treatment Position  bed  -TW  bed  -LITA  bed  -TO    In Bed  supine;call light within reach;encouraged to call for assist;exit alarm on  -TW  call light within reach;encouraged to call for assist;exit alarm on;side rails up x2  -LITA  supine;call light within reach;encouraged to call for assist;exit alarm on  -TO    Recorded by [TW] Abel Wheatley, PTA 01/01/20 1057 [LITA] Mikayla Sauer OTA 01/01/20 1312 [TO] Talon Cavanaugh COTA/L 01/01/20 1350    Row Name 01/01/20 1002 01/01/20 0844 01/01/20 0843       Pain Scale: Numbers Pre/Post-Treatment    Pain Scale: Numbers, Pretreatment  2/10  -TW  4/10  -LITA  4/10  -TO    Pain Scale: Numbers, Post-Treatment  3/10  -TW  4/10  -LITA  4/10  -TO    Pain Location - Side  Right  -TW2  Right  -LITA  Right  -TO    Pain Location - Orientation  --  lower  -LITA  lower  -TO    Pain Location  knee  -TW2  knee  -LITA  knee  -TO    Pain Intervention(s)  --  -- nsg notified  -LITA  --    Recorded by [TW] Abel Wheatley, PTA 01/01/20 1057  [TW2] Abel Wheatley, PTA 01/01/20 1050 [LITA] Mikayla Sauer OTA 01/01/20 1312 [TO] Talon Cavanaugh HAILE/L 01/01/20 1350    Row Name                Wound 12/26/19 1246 Right anterior knee Incision    Wound - Properties Group Date first assessed: 12/26/19 [AQ] Time first assessed: 1246 [AQ] Side: Right [AQ] Orientation: anterior [AQ] Location: knee [AQ] Primary Wound Type: Incision [AQ] Recorded by:  [AQ] Nguyen Dugan RN 12/26/19 1246    Row Name 01/01/20 0844 01/01/20 0843          Coping    Observed Emotional State  accepting;attention-seeking behavior  -LITA  accepting;attention-seeking behavior  -TO     Verbalized Emotional State  acceptance  -LITA   acceptance  -TO     Recorded by [LITA] Mikayla Sauer OTA 01/01/20 1312 [TO] Talon Cavanaugh HAILE/L 01/01/20 1350     Row Name 01/01/20 0844             Plan of Care Review    Plan of Care Reviewed With  patient  -LITA      Recorded by [LITA] Mikayla Sauer OTA 01/01/20 1312      Row Name 01/01/20 0844 01/01/20 0843          Outcome Summary/Treatment Plan (OT)    Daily Summary of Progress (OT)  progress toward functional goals is good  -LITA  --     Anticipated Discharge Disposition (OT)  anticipate therapy at next level of care  -LITA  anticipate therapy at next level of care  -TO     Recorded by [LITA] Mikayla Sauer OTA 01/01/20 1312 [TO] Talon Cavanaugh HAILE/L 01/01/20 1350     Row Name 01/01/20 1002             Outcome Summary/Treatment Plan (PT)    Daily Summary of Progress (PT)  progress toward functional goals is good  -TW      Barriers to Overall Progress (PT)  drainage from R knee with gait and t/f's.  -TW      Plan for Continued Treatment (PT)  Cont  -TW      Anticipated Discharge Disposition (PT)  anticipate therapy at next level of care  -TW2      Recorded by [TW] Abel Wheatley, PTA 01/01/20 1057  [TW2] Abel Wheatley, PTA 01/01/20 1050        User Key  (r) = Recorded By, (t) = Taken By, (c) = Cosigned By    Initials Name Effective Dates Discipline    AQ Nguyen Dugan RN 10/17/16 -  Nurse    TW Abel Wheatley, PTA 03/07/18 -  PT    TO Talon Cavanaugh HAILE/L 03/07/18 -  OT    LITA Mikayla Sauer OTA 11/05/19 -  OT        Wound 12/26/19 1246 Right anterior knee Incision (Active)   Dressing Appearance moist drainage 1/1/2020  8:39 AM   Closure [place] 1/1/2020  8:39 AM   Base pink 1/1/2020  8:39 AM   Drainage Characteristics/Odor serosanguineous 1/1/2020 10:00 AM   Drainage Amount moderate 1/1/2020 10:00 AM   Care, Wound dressing removed 1/1/2020 10:00 AM   Dressing Care, Wound dressing changed;dressing applied 1/1/2020 10:00 AM     Rehab Goal Summary     Row Name 01/01/20 1002 01/01/20 0844           Bed Mobility Goal 1 (PT)    Activity/Assistive Device (Bed Mobility Goal 1, PT)  bed mobility activities, all  -TW  --     [place] Level/Cues Needed (Bed Mobility Goal 1, PT)  independent;conditional independence  -TW  --     Time Frame (Bed Mobility Goal 1, PT)  short term goal (STG);3 days  -TW  --     Progress/Outcomes (Bed Mobility Goal 1, PT)  goal not met  -TW  --        Transfer Goal 1 (PT)    Activity/Assistive Device (Transfer Goal 1, PT)  bed-to-chair/chair-to-bed;toilet  -TW  --     [place] Level/Cues Needed (Transfer Goal 1, PT)  conditional independence  -TW  --     Time Frame (Transfer Goal 1, PT)  long term goal (LTG);by discharge;10 days  -TW  --     Progress/Outcome (Transfer Goal 1, PT)  goal not met  -TW  --        Gait Training Goal 1 (PT)    Activity/Assistive Device (Gait Training Goal 1, PT)  gait (walking locomotion);assistive device use;backward stepping;decrease fall risk;sidestepping;turning, left;turning, right;forward stepping;improve balance and speed;walker, rolling  -TW  --     [place] Level (Gait Training Goal 1, PT)  conditional independence  -TW  --     Distance (Gait Goal 1, PT)  150 ft or more  -TW  --     Time Frame (Gait Training Goal 1, PT)  by discharge;3 weeks;long term goal (LTG)  -TW  --     Progress/Outcome (Gait Training Goal 1, PT)  goal not met  -TW  --        ROM Goal 1 (PT)    ROM Goal 1 (PT)  R knee 0-95 deg AROM  -TW  --     Time Frame (ROM Goal 1, PT)  long term goal (LTG);5 - 7 days  -TW  --     Progress/Outcome (ROM Goal 1, PT)  goal not met  -TW  --        Stairs Goal 1 (PT)    Activity/Assistive Device (Stairs Goal 1, PT)  ascending stairs;descending stairs;assistive device use  -TW  --     [place] Level/Cues Needed (Stairs Goal 1, PT)  conditional independence  -TW  --     Time Frame (Stairs Goal 1, PT)  by discharge;3 weeks  -TW  --     Progress/Outcome (Stairs Goal 1, PT)  goal not met  -TW  --        Occupational Therapy Goals     Transfer Goal Selection (OT)  --  transfer, OT goal 1  -LITA     Dressing Goal Selection (OT)  --  dressing, OT goal 1  -LITA     Balance Goal Selection (OT)  --  balance, OT goal 1  -LITA     Endurance Goal Selection (OT)  --  endurance, OT goal 1  -LITA        Transfer Goal 1 (OT)    Activity/Assistive Device (Transfer Goal 1, OT)  --  toilet;commode, bedside with drop arms;commode, bedside without drop arms  -LITA     [place] Level/Cues Needed (Transfer Goal 1, OT)  --  moderate assist (50-74% patient effort)  -LITA     Time Frame (Transfer Goal 1, OT)  --  long term goal (LTG);by discharge  -LITA     Progress/Outcome (Transfer Goal 1, OT)  --  goal not met  -LITA        Dressing Goal 1 (OT)    Activity/Assistive Device (Dressing Goal 1, OT)  --  dressing skills, all  -LITA     [place]/Cues Needed (Dressing Goal 1, OT)  --  minimum assist (75% or more patient effort)  -LITA     Time Frame (Dressing Goal 1, OT)  --  long term goal (LTG);by discharge  -LITA     Progress/Outcome (Dressing Goal 1, OT)  --  goal not met  -LITA        Balance Goal 1 (OT)    Activity/Assistive Device (Balance Goal 1, OT)  --  sitting, dynamic 30 min no LOB and good safety  -LITA     [place] Level/Cues Needed (Balance Goal 1, OT)  --  supervision required  -LITA     Time Frame (Balance Goal 1, OT)  --  long term goal (LTG);by discharge  -LITA     Progress/Outcomes (Balance Goal 1, OT)  --  goal met  -LITA         Endurance Goal 1 (OT)    Activity Level (Endurance Goal 1, OT)  --  endurance 2 fair + 25 min functional task 3 or less rest breaks.   -LITA     Progress/Outcome (Endurance Goal 1, OT)  --  goal met  -LITA       User Key  (r) = Recorded By, (t) = Taken By, (c) = Cosigned By    Initials Name Provider Type Discipline    Abel Antonio, PTA Physical Therapy Assistant PT    Mikayla Orr OTA Occupational Therapy Assistant OT            OT Recommendation and Plan  Outcome Summary/Treatment Plan (OT)  Daily Summary of Progress (OT):  progress toward functional goals is good  Anticipated Discharge Disposition (OT): anticipate therapy at next level of care  Therapy Frequency (OT Eval): daily  Daily Summary of Progress (OT): progress toward functional goals is good  Plan of Care Review  Plan of Care Reviewed With: patient  Plan of Care Reviewed With: patient  Outcome Summary: Pt agreed to tx on this date. Pt performed bed mobility to include sup<>sit<>sup with SBA. Pt sat EOB about 40 min and performed self feeding for dynamic sitting balance requiring SBA and no LOB noted. Pt min A to veena sock with use of sock aid. Pt donned prosthetic requiring SBA. Pt performed ther ex with 2 lb wand to include rowing, shoulder flex/ex, and shoulder horizontal abb/abd. Post tx pt pressure dressing was saturate with blood and nsg notified.  Outcome Measures     Row Name 01/01/20 1002 01/01/20 0844 01/01/20 0843       How much help from another person do you currently need...    Turning from your back to your side while in flat bed without using bedrails?  4  -TW  --  --    Moving from lying on back to sitting on the side of a flat bed without bedrails?  4  -TW  --  --    Moving to and from a bed to a chair (including a wheelchair)?  3  -TW  --  --    Standing up from a chair using your arms (e.g., wheelchair, bedside chair)?  3  -TW  --  --    Climbing 3-5 steps with a railing?  1  -TW  --  --    To walk in hospital room?  3  -TW  --  --    AM-PAC 6 Clicks Score (PT)  18  -TW  --  --       How much help from another is currently needed...    Putting on and taking off regular lower body clothing?  --  1  -LITA  2  -TO    Bathing (including washing, rinsing, and drying)  --  2  -LITA  2  -TO    Toileting (which includes using toilet bed pan or urinal)  --  2  -LITA  2  -TO    Putting on and taking off regular upper body clothing  --  3  -LITA  3  -TO    Taking care of personal grooming (such as brushing teeth)  --  3  -LITA  3  -TO    Eating meals  --  3  -LITA  4  -TO     AM-PAC 6 Clicks Score (OT)  --  14  -LITA  16  -TO       Functional Assessment    Outcome Measure Options  AM-PAC 6 Clicks Basic Mobility (PT)  -TW  --  --    Row Name 12/31/19 1401 12/31/19 1059 12/30/19 1335       How much help from another person do you currently need...    Turning from your back to your side while in flat bed without using bedrails?  4  -TW  --  --    Moving from lying on back to sitting on the side of a flat bed without bedrails?  4  -TW  --  --    Moving to and from a bed to a chair (including a wheelchair)?  2  -TW  --  --    Standing up from a chair using your arms (e.g., wheelchair, bedside chair)?  3  -TW  --  --    Climbing 3-5 steps with a railing?  1  -TW  --  --    To walk in hospital room?  2  -TW  --  --    AM-PAC 6 Clicks Score (PT)  16  -TW  --  --       How much help from another is currently needed...    Putting on and taking off regular lower body clothing?  --  1  -BB  1  -LITA    Bathing (including washing, rinsing, and drying)  --  2  -BB  2  -LITA    Toileting (which includes using toilet bed pan or urinal)  --  2  -BB  2  -LITA    Putting on and taking off regular upper body clothing  --  3  -BB  2  -LITA    Taking care of personal grooming (such as brushing teeth)  --  3  -BB  2  -LITA    Eating meals  --  3  -BB  3  -LITA    AM-PAC 6 Clicks Score (OT)  --  14  -BB  12  -LITA       Functional Assessment    Outcome Measure Options  AM-PAC 6 Clicks Basic Mobility (PT)  -TW  --  AM-PAC 6 Clicks Daily Activity (OT)  -LITA    Row Name 12/30/19 1325             How much help from another person do you currently need...    Turning from your back to your side while in flat bed without using bedrails?  4  -TW      Moving from lying on back to sitting on the side of a flat bed without bedrails?  4  -TW      Moving to and from a bed to a chair (including a wheelchair)?  1  -TW      Standing up from a chair using your arms (e.g., wheelchair, bedside chair)?  1  -TW      Climbing 3-5 steps with a  railing?  1  -TW      To walk in hospital room?  1  -TW      AM-PAC 6 Clicks Score (PT)  12  -TW         Functional Assessment    Outcome Measure Options  AM-PAC 6 Clicks Basic Mobility (PT)  -TW        User Key  (r) = Recorded By, (t) = Taken By, (c) = Cosigned By    Initials Name Provider Type    TW Abel Wheatley, PTA Physical Therapy Assistant    BB Julia Meyer, HAILE/L Occupational Therapy Assistant    TO Talon Cavanaugh COTA/L Occupational Therapy Assistant    Mikayla Orr OTA Occupational Therapy Assistant           Time Calculation:   Time Calculation- OT     Row Name 01/01/20 1401 01/01/20 1352          Time Calculation- OT    OT Start Time  0844  -LITA  0843  -TO     OT Stop Time  0939  -LITA  0939  -TO     OT Time Calculation (min)  55 min  -LITA  56 min  -TO     Total Timed Code Minutes- OT  55 minute(s)  -LITA  28 minute(s)  -TO     OT Non-Billable Time (min)  28 min  -LITA  28 min  -TO     OT Received On  01/01/20  -LITA  01/01/20  -TO       User Key  (r) = Recorded By, (t) = Taken By, (c) = Cosigned By    Initials Name Provider Type    TO Talon Cavanaugh COTA/L Occupational Therapy Assistant    Mikayla Orr OTA Occupational Therapy Assistant        Therapy Charges for Today     Code Description Service Date Service Provider Modifiers Qty    85978596480 HC OT THER SUPP EA 15 MIN 1/1/2020 Mikayla Sauer OTA GO 2    33001923730 HC OT THERAPEUTIC ACT EA 15 MIN 1/1/2020 Mikayla Sauer OTA GO 1    98293123692 HC OT THER PROC EA 15 MIN 1/1/2020 Mikayla Sauer OTA GO 1               EVA Harris  1/1/2020

## 2020-01-01 NOTE — THERAPY TREATMENT NOTE
Acute Care - Occupational Therapy Treatment Note  [facility]     Patient Name: Michael Sorto  : 1955  MRN: [id number]  Today's Date: 2020  Onset of Illness/Injury or Date of Surgery: 19  Date of Referral to OT: 19  Referring Physician: Dr. Lu    Admit Date: 2019       ICD-10-CM ICD-9-CM   1. Pyogenic arthritis of right knee joint, due to unspecified organism (CMS/HCC) M00.9 711.06   2. Painful total knee replacement, initial encounter (CMS/HCC) T84.84XA 996.77    Z96.659 V43.65     338.18   3. Impaired functional mobility, balance, gait, and endurance Z74.09 V49.89   4. Impaired mobility and ADLs Z74.09 799.89     Patient Active Problem List   Diagnosis   • Headache   • Nausea   • Nontraumatic intracerebral hemorrhage (CMS/HCC)   • Hypertension   • Morbid obesity (CMS/HCC)   • Cellulitis of left lower extremity   • Intracerebral hemorrhage (CMS/HCC)   • Chronic pain of right knee   • Painful total knee replacement, initial encounter (CMS/HCC)   • Pyogenic arthritis of right knee joint (CMS/HCC)   • Painful total knee replacement (CMS/HCC)     Past Medical History:   Diagnosis Date   • Aortic valvar stenosis    • Cellulitis of right lower extremity    • Chronic pain syndrome    • CVA (cerebral vascular accident) (CMS/HCC)    • Depressive disorder    • GERD (gastroesophageal reflux disease)    • Hypertension    • Injury of lower leg    • Kidney stone    • Morbid obesity (CMS/HCC)    • Painful total knee replacement, initial encounter (CMS/HCC) 3/6/2019   • Pyogenic arthritis of right knee joint (CMS/HCC) 3/6/2019   • Right knee pain      Past Surgical History:   Procedure Laterality Date   • AMPUTATION Left     BKA   • AORTIC VALVE REPAIR/REPLACEMENT     • APPENDECTOMY     • CARDIAC CATHETERIZATION     • REPLACEMENT TOTAL KNEE Right    • TONSILLECTOMY     • TOTAL KNEE  PROSTHESIS REMOVAL W/ SPACER INSERTION Right 2019    Procedure: REMOVAL OF TOTAL KNEE  COMPONENTS RIGHT KNEE WITH INSERTION OF CEMENT ANTIBIOTIC SPACER;  Surgeon: Rufino Lu MD;  Location: [facility];  Service: Orthopedics   • TOTAL KNEE ARTHROPLASTY REVISION Right 12/26/2019    Procedure: RIGHT TOTAL KNEE ARTHROPLASTY REVISION;  Surgeon: Rufino Lu MD;  Location: [facility];  Service: Orthopedics       Therapy Treatment    Rehabilitation Treatment Summary     Row Name 01/01/20 1002 01/01/20 0844 01/01/20 0843       Treatment Time/Intention    Discipline  physical therapy assistant  -TW  occupational therapy assistant  -LITA  occupational therapy assistant  -TO    Document Type  therapy note (daily note)  -TW  therapy note (daily note)  -LITA  therapy note (daily note)  -TO    Subjective Information  no complaints  -TW  complains of;swelling  -LITA  --    Mode of Treatment  physical therapy;individual therapy  -TW  occupational therapy;individual therapy  -LITA  occupational therapy;co-treatment  -TO    Patient/Family Observations  Pt agreeable to tx.  -TW  --  --    Care Plan Review  --  care plan/treatment goals reviewed;current/potential barriers reviewed;patient/other agree to care plan  -LITA  --    Total Minutes, Occupational Therapy Treatment  --  55  -LITA  --    Therapy Frequency (OT Eval)  --  daily  -LITA  daily  -TO    Patient Effort  good  -TW  good  -LITA  good  -TO    Existing Precautions/Restrictions  fall  -TW  fall  -LITA  fall  -TO    Recorded by [TW] Abel Wheatley, WADE 01/01/20 1050 [LITA] Mikayla Sauer OTA 01/01/20 1312 [TO] Talon Cavanaugh COTA/L 01/01/20 1350    Row Name 01/01/20 1002 01/01/20 0844 01/01/20 0843       Vital Signs    Pre Systolic BP Rehab  142  -TW  158  -LITA  158  -TO    Pre Treatment Diastolic BP  72  -TW  84  -LITA  84  -TO    Post Systolic BP Rehab  --  138  -LITA  138  -TO    Post Treatment Diastolic BP  --  72  -LITA  72  -TO    Pretreatment Heart Rate (beats/min)  80  -TW  84  -LITA  84  -TO    Intratreatment Heart Rate (beats/min)  --  115  -LITA  115  -TO     Posttreatment Heart Rate (beats/min)  88  -TW  98  -LITA  98  -TO    Pre SpO2 (%)  98  -TW  98  -LITA  98  -TO    O2 Delivery Pre Treatment  room air  -TW  room air  -LITA  room air  -TO    Intra SpO2 (%)  --  99  -LITA  99  -TO    O2 Delivery Intra Treatment  --  room air  -LITA  room air  -TO    Post SpO2 (%)  99  -TW  98  -LITA  98  -TO    O2 Delivery Post Treatment  --  room air  -LITA  room air  -TO    Pre Patient Position  Supine  -TW  Supine  -LITA  Supine  -TO    Intra Patient Position  Standing  -TW  Sitting  -LITA  Sitting  -TO    Post Patient Position  Supine  -TW  Supine  -LITA  Supine  -TO    Recorded by [TW] Abel Wheatley, WADE 01/01/20 1057 [LITA] Mikayla Sauer OTA 01/01/20 1312 [TO] Talon Cavanaugh COTA/L 01/01/20 1350    Row Name 01/01/20 1002 01/01/20 0844 01/01/20 0843       Cognitive Assessment/Intervention- PT/OT    Affect/Mental Status (Cognitive)  WFL  -TW  WFL  -LITA  WFL  -TO    Orientation Status (Cognition)  oriented x 4  -TW  oriented x 4  -LITA  oriented x 4  -TO    Follows Commands (Cognition)  follows one step commands;over 90% accuracy  -TW  follows one step commands;over 90% accuracy  -LITA  follows one step commands  -TO    Safety Deficit (Cognitive)  --  mild deficit  -LITA  mild deficit  -TO    Personal Safety Interventions  fall prevention program maintained;gait belt;nonskid shoes/slippers when out of bed  -TW2  fall prevention program maintained;muscle strengthening facilitated;supervised activity  -LITA  --    Recorded by [TW] Abel Wheatley, WADE 01/01/20 1050  [TW2] Abel Wheatley, WADE 01/01/20 1057 [LITA] Mikayla Sauer OTA 01/01/20 1312 [TO] Talon Cavanaugh COTA/L 01/01/20 1350    Row Name 01/01/20 1002             Safety Issues, Functional Mobility    Impairments Affecting Function (Mobility)  balance;endurance/activity tolerance;pain;shortness of breath;strength  -TW      Recorded by [TW] Abel Wheatley, WADE 01/01/20 1050      Row Name 01/01/20 1002 01/01/20 0844 01/01/20 0843        Mobility Assessment/Intervention    Extremity Weight-bearing Status  --  right lower extremity  -LITA  right lower extremity  -TO    Right Lower Extremity (Weight-bearing Status)  (S) weight-bearing as tolerated (WBAT)  -TW  weight-bearing as tolerated (WBAT)  -LITA  weight-bearing as tolerated (WBAT)  -TO    Recorded by [TW] Abel Wheatley, WADE 01/01/20 1050 [LITA] Mikayla Sauer OTA 01/01/20 1312 [TO] Talon Cavanaugh HAILE/L 01/01/20 1350    Row Name 01/01/20 1002 01/01/20 0844 01/01/20 0843       Bed Mobility Assessment/Treatment    Bed Mobility Assessment/Treatment  --  supine-sit-supine  -LITA  supine-sit-supine  -TO    [place] Level (Bed Mobility)  --  standby assist  -LITA  standby assist  -TO    Rolling Left [place] (Bed Mobility)  conditional independence  -TW  --  --    Rolling Right [place] (Bed Mobility)  conditional independence  -TW  --  --    Scooting/Bridging [place] (Bed Mobility)  conditional independence  -TW2  --  --    Supine-Sit [place] (Bed Mobility)  conditional independence  -TW  --  --    Sit-Supine [place] (Bed Mobility)  conditional independence  -TW  --  --    Supine-Sit-Supine [place] (Bed Mobility)  --  -- SBA  -LITA  -- SBA  -TO    Bed Mobility, Safety Issues  decreased use of legs for bridging/pushing  -TW2  decreased use of legs for bridging/pushing  -LITA  decreased use of legs for bridging/pushing  -TO    Assistive Device (Bed Mobility)  bed rails;head of bed elevated  -TW2  bed rails  -LITA  bed rails  -TO    Comment (Bed Mobility)  Pt able to sit EOB and veena L LE prosthetic ind with use of 3-ply to obtain good fit.  -TW  --  --    Recorded by [TW] Abel Wheatley, WADE 01/01/20 1057  [TW2] Abel Wheatley, WADE 01/01/20 1050 [LITA] Mikayla Sauer OTA 01/01/20 1312 [TO] Talon Cavanaugh COTA/L 01/01/20 1350    Row Name 01/01/20 1002             Transfer Assessment/Treatment    Transfer Assessment/Treatment  bed-chair transfer;chair-bed  transfer;sit-stand transfer;stand-sit transfer  -TW      Recorded by [TW] Abel Wheatley, PTA 01/01/20 1050      Row Name 01/01/20 1002             Bed-Chair Transfer    Bed-Chair [place] (Transfers)  contact guard;minimum assist (75% patient effort)  -TW      Assistive Device (Bed-Chair Transfers)  walker, front-wheeled  -TW2      Recorded by [TW] Abel Wheatley, PTA 01/01/20 1057  [TW2] Abel Wheatley, PTA 01/01/20 1050      Row Name 01/01/20 1002             Chair-Bed Transfer    Chair-Bed [place] (Transfers)  contact guard;minimum assist (75% patient effort)  -TW      Assistive Device (Chair-Bed Transfers)  walker, front-wheeled  -TW2      Recorded by [TW] Abel Wheatley, PTA 01/01/20 1057  [TW2] Abel Wheatley, PTA 01/01/20 1050      Row Name 01/01/20 1002             Sit-Stand Transfer    Sit-Stand [place] (Transfers)  contact guard;minimum assist (75% patient effort)  -TW      Assistive Device (Sit-Stand Transfers)  walker, front-wheeled  -TW2      Recorded by [TW] Abel Wheatley, PTA 01/01/20 1057  [TW2] Abel Wheatley, PTA 01/01/20 1050      Row Name 01/01/20 1002             Stand-Sit Transfer    Stand-Sit [place] (Transfers)  contact guard;minimum assist (75% patient effort)  -TW      Assistive Device (Stand-Sit Transfers)  walker, front-wheeled  -TW2      Recorded by [TW] Abel Wheatley, PTA 01/01/20 1057  [TW2] Abel Wheatley, PTA 01/01/20 1050      Row Name 01/01/20 1002             Gait/Stairs Assessment/Training    Gait/Stairs Assessment/Training  gait/ambulation assistive device  -TW      [place] Level (Gait)  contact guard;minimum assist (75% patient effort)  -TW2      Assistive Device (Gait)  walker, front-wheeled  -TW      Distance in Feet (Gait)  20ft then 4ft to bed from w/c.  -TW2      Comment (Gait/Stairs)  Pt had drainage from R knee incision that soaked through pressure dressing and required to be changed after pt t/f to  Patient supine.  -TW2      Recorded by [TW] Abel Wheatley PTA 01/01/20 1050  [TW2] Abel Wheatley, PTA 01/01/20 1057      Row Name 01/01/20 0844 01/01/20 0843          ADL Assessment/Intervention    BADL Assessment/Intervention  --  -TW,LITA  lower body dressing  -TO     Recorded by [TW,LITA] Abel Wheatley, PTA (r) Mikayla Sauer OTA (t) 01/01/20 1302 [TO] Talon Cavanaugh HAILE/L 01/01/20 1355     Row Name 01/01/20 0843             Lower Body Dressing Assessment/Training    Lower Body Dressing [place] Level  lower body dressing skills;don;socks;moderate assist (50% patient effort) edu on sock aide.   -TO      Recorded by [TO] Talon Cavanaugh HAILE/L 01/01/20 1355      Row Name 01/01/20 0844 01/01/20 0843          Self-Feeding Assessment/Training    [place] Level (Feeding)  feeding skills;set up;independent  -LITA  feeding skills;set up;independent  -TO     Comment (Feeding)  for dynamic sitting ax  -LITA  during EOB siting  -TO     Recorded by [LITA] Mikyala Sauer OTA 01/01/20 1314 [TO] Talon Cavanaugh HAILE/L 01/01/20 1350     Row Name 01/01/20 0844 01/01/20 0843          BADL Safety/Performance    Impairments, BADL Safety/Performance  endurance/activity tolerance;maintains weight bearing status;pain;range of motion  -LITA  endurance/activity tolerance  -TO     Recorded by [LITA] Mikayla Sauer OTA 01/01/20 1312 [TO] Talon Cavanaugh HAILE/L 01/01/20 1350     Row Name 01/01/20 0844             Therapeutic Exercise    Therapeutic Exercise  --  -LITA      Recorded by [LITA] Mikayla Sauer OTA 01/01/20 1312      Row Name 01/01/20 0844 01/01/20 0843          Therapeutic Exercise    Upper Extremity (Therapeutic Exercise)  wand exercises  -LITA  wand exercises  -TO     Upper Extremity Range of Motion (Therapeutic Exercise)  shoulder flexion/extension, bilateral;shoulder horizontal abduction/adduction, bilateral rowing  -LITA  shoulder flexion/extension, bilateral;shoulder horizontal abduction/adduction, bilateral  rowing;diagonals during dynamic sitting  -TO     Weight/Resistance (Therapeutic Exercise)  2 pounds  -LITA  2 pounds  -TO     Exercise Type (Therapeutic Exercise)  AROM (active range of motion)  -LITA  AAROM (active assistive range of motion)  -TO     Position (Therapeutic Exercise)  seated  -LITA  --     Sets/Reps (Therapeutic Exercise)  20x1  -LITA  20x1  -TO     Equipment (Therapeutic Exercise)  dowel margaret/wand  -LITA  --     Expected Outcome (Therapeutic Exercise)  improve functional tolerance, self-care activity;improve performance, BADLs;improve performance, transfer skills  -LITA  improve functional tolerance, self-care activity;improve performance, transfer skills  -TO     Recorded by [LITA] Mikayla Sauer OTA 01/01/20 1312 [TO] Talon Cavanaugh COTA/L 01/01/20 1350     Row Name 01/01/20 0843             Balance    Balance  dynamic sitting balance;static sitting balance  -TO      Recorded by [TO] Talon Cavanaugh COTA/L 01/01/20 1350      Row Name 01/01/20 0843             Static Sitting Balance    Level of [place] (Unsupported Sitting, Static Balance)  independent  -TO      Sitting Position (Unsupported Sitting, Static Balance)  sitting on edge of bed  -TO      Time Able to Maintain Position (Unsupported Sitting, Static Balance)  more than 5 minutes  -TO      Recorded by [TO] Talon Cavanaugh COTA/L 01/01/20 1350      Row Name 01/01/20 0844 01/01/20 0843          Dynamic Sitting Balance    Level of [place], Reaches Outside Midline (Sitting, Dynamic Balance)  supervision  -LITA  supervision  -TO     Sitting Position, Reaches Outside Midline (Sitting, Dynamic Balance)  sitting on edge of bed  -LITA  sitting on edge of bed  -TO     Comment, Reaches Outside Midline (Sitting, Dynamic Balance)  --  B UE ther ex  -TO     Level of [place], Resists Mild Perturbations (Sitting, Dynamic Balance)  supervision  -LITA  supervision  -TO     Recorded by [LITA] Mikayla Sauer OTA 01/01/20 1312 [TO] Talon Cavanaugh COTA/LIAM  01/01/20 1350     Row Name 01/01/20 1002 01/01/20 0844 01/01/20 0843       Positioning and Restraints    Pre-Treatment Position  in bed  -TW  in bed  -LITA  in bed  -TO    Post Treatment Position  bed  -TW  bed  -LITA  bed  -TO    In Bed  supine;call light within reach;encouraged to call for assist;exit alarm on  -TW  call light within reach;encouraged to call for assist;exit alarm on;side rails up x2  -LITA  supine;call light within reach;encouraged to call for assist;exit alarm on  -TO    Recorded by [TW] Abel Wheatley, PTA 01/01/20 1057 [LITA] Mikayla Sauer OTA 01/01/20 1312 [TO] Talon Cavanaugh COTA/L 01/01/20 1350    Row Name 01/01/20 1002 01/01/20 0844 01/01/20 0843       Pain Scale: Numbers Pre/Post-Treatment    Pain Scale: Numbers, Pretreatment  2/10  -TW  4/10  -LITA  4/10  -TO    Pain Scale: Numbers, Post-Treatment  3/10  -TW  4/10  -LITA  4/10  -TO    Pain Location - Side  Right  -TW2  Right  -LITA  Right  -TO    Pain Location - Orientation  --  lower  -LITA  lower  -TO    Pain Location  knee  -TW2  knee  -LITA  knee  -TO    Pain Intervention(s)  --  -- nsg notified  -LITA  --    Recorded by [TW] Abel Wheatley, PTA 01/01/20 1057  [TW2] Abel Wheatley, PTA 01/01/20 1050 [LITA] Mikayla Sauer OTA 01/01/20 1312 [TO] Talon Cavanaugh COTA/L 01/01/20 1350    Row Name                Wound 12/26/19 1246 Right anterior knee Incision    Wound - Properties Group Date first assessed: 12/26/19 [AQ] Time first assessed: 1246 [AQ] Side: Right [AQ] Orientation: anterior [AQ] Location: knee [AQ] Primary Wound Type: Incision [AQ] Recorded by:  [AQ] Nguyen Dugan RN 12/26/19 1246    Row Name 01/01/20 0844 01/01/20 0843          Coping    Observed Emotional State  accepting;attention-seeking behavior  -LITA  accepting;attention-seeking behavior  -TO     Verbalized Emotional State  acceptance  -LITA  acceptance  -TO     Recorded by [LITA] Mikayla Sauer OTA 01/01/20 1312 [TO] Talon Cavanaugh COTA/L 01/01/20 1350     Row Name  01/01/20 0844             Plan of Care Review    Plan of Care Reviewed With  patient  -LITA      Recorded by [LITA] Mikayla Sauer OTA 01/01/20 1312      Row Name 01/01/20 0844 01/01/20 0843          Outcome Summary/Treatment Plan (OT)    Daily Summary of Progress (OT)  progress toward functional goals is good  -LITA  --     Anticipated Discharge Disposition (OT)  anticipate therapy at next level of care  -LITA  anticipate therapy at next level of care  -TO     Recorded by [LITA] Mikayla Sauer OTA 01/01/20 1312 [TO] Talon Cavanaugh HAILE/L 01/01/20 1350     Row Name 01/01/20 1002             Outcome Summary/Treatment Plan (PT)    Daily Summary of Progress (PT)  progress toward functional goals is good  -TW      Barriers to Overall Progress (PT)  drainage from R knee with gait and t/f's.  -TW      Plan for Continued Treatment (PT)  Cont  -TW      Anticipated Discharge Disposition (PT)  anticipate therapy at next level of care  -TW2      Recorded by [TW] Abel Wheatley PTA 01/01/20 1057  [TW2] Abel Wheatley, WADE 01/01/20 1050        User Key  (r) = Recorded By, (t) = Taken By, (c) = Cosigned By    Initials Name Effective Dates Discipline    AQ Nguyen Dugan RN 10/17/16 -  Nurse    TW Abel Wheatley, PTA 03/07/18 -  PT    TO Talon Cavanaugh HAILE/L 03/07/18 -  OT    LITA Mikayla Sauer OTA 11/05/19 -  OT        Wound 12/26/19 1246 Right anterior knee Incision (Active)   Dressing Appearance moist drainage 1/1/2020  8:39 AM   Closure [place] 1/1/2020  8:39 AM   Base pink 1/1/2020  8:39 AM   Drainage Characteristics/Odor serosanguineous 1/1/2020 10:00 AM   Drainage Amount moderate 1/1/2020 10:00 AM   Care, Wound dressing removed 1/1/2020 10:00 AM   Dressing Care, Wound dressing changed;dressing applied 1/1/2020 10:00 AM     Rehab Goal Summary     Row Name 01/01/20 1002 01/01/20 0844          Bed Mobility Goal 1 (PT)    Activity/Assistive Device (Bed Mobility Goal 1, PT)  bed mobility activities, all  -TW  --      [place] Level/Cues Needed (Bed Mobility Goal 1, PT)  independent;conditional independence  -TW  --     Time Frame (Bed Mobility Goal 1, PT)  short term goal (STG);3 days  -TW  --     Progress/Outcomes (Bed Mobility Goal 1, PT)  goal not met  -TW  --        Transfer Goal 1 (PT)    Activity/Assistive Device (Transfer Goal 1, PT)  bed-to-chair/chair-to-bed;toilet  -TW  --     [place] Level/Cues Needed (Transfer Goal 1, PT)  conditional independence  -TW  --     Time Frame (Transfer Goal 1, PT)  long term goal (LTG);by discharge;10 days  -TW  --     Progress/Outcome (Transfer Goal 1, PT)  goal not met  -TW  --        Gait Training Goal 1 (PT)    Activity/Assistive Device (Gait Training Goal 1, PT)  gait (walking locomotion);assistive device use;backward stepping;decrease fall risk;sidestepping;turning, left;turning, right;forward stepping;improve balance and speed;walker, rolling  -TW  --     [place] Level (Gait Training Goal 1, PT)  conditional independence  -TW  --     Distance (Gait Goal 1, PT)  150 ft or more  -TW  --     Time Frame (Gait Training Goal 1, PT)  by discharge;3 weeks;long term goal (LTG)  -TW  --     Progress/Outcome (Gait Training Goal 1, PT)  goal not met  -TW  --        ROM Goal 1 (PT)    ROM Goal 1 (PT)  R knee 0-95 deg AROM  -TW  --     Time Frame (ROM Goal 1, PT)  long term goal (LTG);5 - 7 days  -TW  --     Progress/Outcome (ROM Goal 1, PT)  goal not met  -TW  --        Stairs Goal 1 (PT)    Activity/Assistive Device (Stairs Goal 1, PT)  ascending stairs;descending stairs;assistive device use  -TW  --     [place] Level/Cues Needed (Stairs Goal 1, PT)  conditional independence  -TW  --     Time Frame (Stairs Goal 1, PT)  by discharge;3 weeks  -TW  --     Progress/Outcome (Stairs Goal 1, PT)  goal not met  -TW  --        Occupational Therapy Goals    Transfer Goal Selection (OT)  --  transfer, OT goal 1  -LITA     Dressing Goal Selection (OT)  --  dressing, OT goal 1  -LITA      Balance Goal Selection (OT)  --  balance, OT goal 1  -LITA     Endurance Goal Selection (OT)  --  endurance, OT goal 1  -LITA        Transfer Goal 1 (OT)    Activity/Assistive Device (Transfer Goal 1, OT)  --  toilet;commode, bedside with drop arms;commode, bedside without drop arms  -LITA     [place] Level/Cues Needed (Transfer Goal 1, OT)  --  moderate assist (50-74% patient effort)  -LITA     Time Frame (Transfer Goal 1, OT)  --  long term goal (LTG);by discharge  -LITA     Progress/Outcome (Transfer Goal 1, OT)  --  goal not met  -LITA        Dressing Goal 1 (OT)    Activity/Assistive Device (Dressing Goal 1, OT)  --  dressing skills, all  -LITA     [place]/Cues Needed (Dressing Goal 1, OT)  --  minimum assist (75% or more patient effort)  -LITA     Time Frame (Dressing Goal 1, OT)  --  long term goal (LTG);by discharge  -LITA     Progress/Outcome (Dressing Goal 1, OT)  --  goal not met  -LITA        Balance Goal 1 (OT)    Activity/Assistive Device (Balance Goal 1, OT)  --  sitting, dynamic 30 min no LOB and good safety  -LITA     [place] Level/Cues Needed (Balance Goal 1, OT)  --  supervision required  -LITA     Time Frame (Balance Goal 1, OT)  --  long term goal (LTG);by discharge  -LITA     Progress/Outcomes (Balance Goal 1, OT)  --  goal met  -LITA         Endurance Goal 1 (OT)    Activity Level (Endurance Goal 1, OT)  --  endurance 2 fair + 25 min functional task 3 or less rest breaks.   -LITA     Progress/Outcome (Endurance Goal 1, OT)  --  goal met  -LITA       User Key  (r) = Recorded By, (t) = Taken By, (c) = Cosigned By    Initials Name Provider Type Discipline    TW Abel Wheatley, PTA Physical Therapy Assistant PT    Mikayla Orr OTA Occupational Therapy Assistant OT            OT Recommendation and Plan  Outcome Summary/Treatment Plan (OT)  Anticipated Discharge Disposition (OT): anticipate therapy at next level of care  Therapy Frequency (OT Eval): daily     Outcome Measures     Row Name 01/01/20 1002  01/01/20 0844 01/01/20 0843       How much help from another person do you currently need...    Turning from your back to your side while in flat bed without using bedrails?  4  -TW  --  --    Moving from lying on back to sitting on the side of a flat bed without bedrails?  4  -TW  --  --    Moving to and from a bed to a chair (including a wheelchair)?  3  -TW  --  --    Standing up from a chair using your arms (e.g., wheelchair, bedside chair)?  3  -TW  --  --    Climbing 3-5 steps with a railing?  1  -TW  --  --    To walk in hospital room?  3  -TW  --  --    AM-PAC 6 Clicks Score (PT)  18  -TW  --  --       How much help from another is currently needed...    Putting on and taking off regular lower body clothing?  --  1  -LITA  2  -TO    Bathing (including washing, rinsing, and drying)  --  2  -LITA  2  -TO    Toileting (which includes using toilet bed pan or urinal)  --  2  -LITA  2  -TO    Putting on and taking off regular upper body clothing  --  3  -LITA  3  -TO    Taking care of personal grooming (such as brushing teeth)  --  3  -LITA  3  -TO    Eating meals  --  3  -LITA  4  -TO    AM-PAC 6 Clicks Score (OT)  --  14  -LITA  16  -TO       Functional Assessment    Outcome Measure Options  AM-PAC 6 Clicks Basic Mobility (PT)  -TW  --  --    Row Name 12/31/19 1401 12/31/19 1059 12/30/19 1335       How much help from another person do you currently need...    Turning from your back to your side while in flat bed without using bedrails?  4  -TW  --  --    Moving from lying on back to sitting on the side of a flat bed without bedrails?  4  -TW  --  --    Moving to and from a bed to a chair (including a wheelchair)?  2  -TW  --  --    Standing up from a chair using your arms (e.g., wheelchair, bedside chair)?  3  -TW  --  --    Climbing 3-5 steps with a railing?  1  -TW  --  --    To walk in hospital room?  2  -TW  --  --    AM-PAC 6 Clicks Score (PT)  16  -TW  --  --       How much help from another is currently needed...     Putting on and taking off regular lower body clothing?  --  1  -BB  1  -LITA    Bathing (including washing, rinsing, and drying)  --  2  -BB  2  -LITA    Toileting (which includes using toilet bed pan or urinal)  --  2  -BB  2  -LITA    Putting on and taking off regular upper body clothing  --  3  -BB  2  -LITA    Taking care of personal grooming (such as brushing teeth)  --  3  -BB  2  -LITA    Eating meals  --  3  -BB  3  -LITA    AM-PAC 6 Clicks Score (OT)  --  14  -BB  12  -LITA       Functional Assessment    Outcome Measure Options  AM-PAC 6 Clicks Basic Mobility (PT)  -TW  --  AM-PAC 6 Clicks Daily Activity (OT)  -LITA    Row Name 12/30/19 1325             How much help from another person do you currently need...    Turning from your back to your side while in flat bed without using bedrails?  4  -TW      Moving from lying on back to sitting on the side of a flat bed without bedrails?  4  -TW      Moving to and from a bed to a chair (including a wheelchair)?  1  -TW      Standing up from a chair using your arms (e.g., wheelchair, bedside chair)?  1  -TW      Climbing 3-5 steps with a railing?  1  -TW      To walk in hospital room?  1  -TW      AM-PAC 6 Clicks Score (PT)  12  -TW         Functional Assessment    Outcome Measure Options  AM-PAC 6 Clicks Basic Mobility (PT)  -TW        User Key  (r) = Recorded By, (t) = Taken By, (c) = Cosigned By    Initials Name Provider Type    TW Abel Wheatley, WADE Physical Therapy Assistant    BB Julia Meyer, HAILE/L Occupational Therapy Assistant    Talon Carrillo, HAILE/L Occupational Therapy Assistant    Mikayla Orr OTA Occupational Therapy Assistant           Time Calculation:   Time Calculation- OT     Row Name 01/01/20 1352             Time Calculation- OT    OT Start Time  0843  -TO      OT Stop Time  0939  -TO      OT Time Calculation (min)  56 min  -TO      Total Timed Code Minutes- OT  28 minute(s)  -TO      OT Non-Billable Time (min)  28 min  -TO      OT  Received On  01/01/20  -TO        User Key  (r) = Recorded By, (t) = Taken By, (c) = Cosigned By    Initials Name Provider Type    TO Talon Cavanaugh COTA/L Occupational Therapy Assistant        Therapy Charges for Today     Code Description Service Date Service Provider Modifiers Qty    [id number]  OT SELF CARE/MGMT/TRAIN EA 15 MIN 1/1/2020 Talon Cavanaugh HAILE/L GO 1    [id number]  OT THERAPEUTIC ACT EA 15 MIN 1/1/2020 Talon Cavanaugh HAILE/L GO 1               LAZARA Weston/LIAM  1/1/2020

## 2020-01-01 NOTE — PLAN OF CARE
Problem: Patient Care Overview  Goal: Plan of Care Review  Outcome: Ongoing (interventions implemented as appropriate)  Flowsheets (Taken 1/1/2020 1002)  Progress: improving  Plan of Care Reviewed With: patient  Outcome Summary: Pt able to t/f sup to sit with supervision. Pt able to veena L LE prosthetic prior to standing and amb. Pt required 3-ply sock to acheive good fit with prosthetic. Pt stood with min of 1 and amb 20ft with RW and min/CGA of 1. Pt then amb back to bed with same assist as before. Pt R LE oozing blood tinged fluid from proximal incision site on R knee. Pt pressure dressing was saturated and leaking at end of gait. Nsg made aware and came to change dressing. Pt would cont to benefit from therapy upon DC.

## 2020-01-01 NOTE — PROGRESS NOTES
ORTHOPEDIC PROGRESS NOTE:    Name:  Michael Sorto  Date:    1/1/2020  Date of admission:  12/26/2019    Post op day:  6 Days Post-Op  Procedure:    Procedure(s) (LRB):  RIGHT TOTAL KNEE ARTHROPLASTY REVISION (Right)    Subjective: Actually walked out in pace today.  Still not drainage from wound but decreased    Vitals:    Vitals:    01/01/20 0938   BP: 138/72   Pulse:    Resp:    Temp:    SpO2:        Exam: Blood pressure normal.    Lab Results (last 24 hours)     ** No results found for the last 24 hours. **          ASSESSMENT:  Active Problems:    Painful total knee replacement, initial encounter (CMS/[facility])    Pyogenic arthritis of right knee joint (CMS/[facility])    Painful total knee replacement (CMS/[facility])        PLAN: Continue mobilization.  Follow pressure.  Check with tomorrow.  Having trouble managing immobilizer.  Only reason for this is to keep her leg straight in bed.  Mostly independent thinking go home.      Rufino Lu MD  01/01/20  10:45 AM

## 2020-01-02 ENCOUNTER — APPOINTMENT (OUTPATIENT)
Dept: ULTRASOUND IMAGING | Facility: HOSPITAL | Age: 65
End: 2020-01-02

## 2020-01-02 PROCEDURE — 93971 EXTREMITY STUDY: CPT

## 2020-01-02 PROCEDURE — 97530 THERAPEUTIC ACTIVITIES: CPT

## 2020-01-02 PROCEDURE — 97535 SELF CARE MNGMENT TRAINING: CPT

## 2020-01-02 PROCEDURE — 99024 POSTOP FOLLOW-UP VISIT: CPT | Performed by: ORTHOPAEDIC SURGERY

## 2020-01-02 PROCEDURE — 97116 GAIT TRAINING THERAPY: CPT

## 2020-01-02 RX ADMIN — FAMOTIDINE 40 MG: 40 TABLET ORAL at 09:00

## 2020-01-02 RX ADMIN — ACETAMINOPHEN 1000 MG: 500 TABLET ORAL at 12:21

## 2020-01-02 RX ADMIN — SENNOSIDES AND DOCUSATE SODIUM 2 TABLET: 8.6; 5 TABLET ORAL at 21:39

## 2020-01-02 RX ADMIN — TERAZOSIN HYDROCHLORIDE ANHYDROUS 5 MG: 5 CAPSULE ORAL at 21:39

## 2020-01-02 RX ADMIN — BUSPIRONE HYDROCHLORIDE 5 MG: 5 TABLET ORAL at 09:00

## 2020-01-02 RX ADMIN — CARVEDILOL 3.12 MG: 3.12 TABLET, FILM COATED ORAL at 18:22

## 2020-01-02 RX ADMIN — ACETAMINOPHEN 1000 MG: 500 TABLET ORAL at 09:00

## 2020-01-02 RX ADMIN — SODIUM CHLORIDE, PRESERVATIVE FREE 3 ML: 5 INJECTION INTRAVENOUS at 21:39

## 2020-01-02 RX ADMIN — ASPIRIN 325 MG: 325 TABLET, DELAYED RELEASE ORAL at 21:38

## 2020-01-02 RX ADMIN — FERROUS SULFATE TAB EC 324 MG (65 MG FE EQUIVALENT) 324 MG: 324 (65 FE) TABLET DELAYED RESPONSE at 09:00

## 2020-01-02 RX ADMIN — CARVEDILOL 3.12 MG: 3.12 TABLET, FILM COATED ORAL at 08:59

## 2020-01-02 RX ADMIN — ACETAMINOPHEN 1000 MG: 500 TABLET ORAL at 21:38

## 2020-01-02 RX ADMIN — ACETAMINOPHEN 1000 MG: 500 TABLET ORAL at 18:22

## 2020-01-02 RX ADMIN — BUSPIRONE HYDROCHLORIDE 5 MG: 5 TABLET ORAL at 21:39

## 2020-01-02 NOTE — CONSULTS
Adult Nutrition  Assessment    Patient Name:  Michael Sorto  YOB: 1955  MRN: [id number]  Admit Date:  12/26/2019    Assessment Date:  1/2/2020    Comments:  Followed up with pt due to LOS.  He is eating well with no Gi distress.  No questions regarding diet.  S/p Total knee replacement.  RD will continue to monitor tx plans until discharged.      Reason for Assessment     Row Name 01/02/20 1539          Reason for Assessment    Reason For Assessment  follow-up protocol         Nutrition/Diet History     Row Name 01/02/20 1539          Nutrition/Diet History    Typical Food/Fluid Intake  Pt reports that the meals are great.  NO complaints or requests           Labs/Tests/Procedures/Meds     Row Name 01/02/20 1540          Labs/Procedures/Meds    Lab Results Reviewed  reviewed, pertinent        Diagnostic Tests/Procedures    Diagnostic Test/Procedure Reviewed  reviewed, pertinent        Medications    Pertinent Medications Reviewed  reviewed, pertinent         Physical Findings     Row Name 01/02/20 1542          Physical Findings    Overall Physical Appearance  amputee;obese         Estimated/Assessed Needs     Row Name 01/02/20 1542          Calculation Measurements    Weight Used For Calculations  61.7 kg (136 lb)        Estimated/Assessed Needs    Additional Documentation  Additional Requirements (Group);Fluid Requirements (Group);De Baca-St. Jeor Equation (Group);Protein Requirements (Group);Calorie Requirements (Group);KCAL/KG (Group)        Calorie Requirements    Estimated Calorie Need Method  kcal/kg     Measured Resting Energy Expenditure (MREE)  1800 Kcal/day        KCAL/KG    KCAL/KG  30 Kcal/Kg (kcal)     30 Kcal/Kg (kcal)  1850.67        De Baca-St. Jeor Equation    RMR (De Baca-St. Jeor Equation)  1338.765        Protein Requirements    Weight Used For Protein Calculations  61.7 kg (136 lb)     Est Protein Requirement Amount (gms/kg)  1.3 gm protein     Estimated Protein Requirements  (gms/day)  80.2        Fluid Requirements    Estimated Fluid Requirements (mL/day)  1800     RDA Method (mL)  1800     Gopal-Megan Method (over 20 kg)  2733.78         Nutrition Prescription Ordered     Row Name 01/02/20 1545          Nutrition Prescription PO    Current PO Diet  Regular     Fluid Consistency  Thin         Evaluation of Received Nutrient/Fluid Intake     Row Name 01/02/20 1545          PO Evaluation    Number of Days PO Intake Evaluated  3 days     Number of Meals  5     % PO Intake  85% average               Electronically signed by:  Julieta Sequeira RD  01/02/20 3:46 PM

## 2020-01-02 NOTE — PROGRESS NOTES
ORTHOPEDIC PROGRESS NOTE:    Name:  Michael Sorto  Date:    1/2/2020  Date of admission:  12/26/2019    Post op day:  7 Days Post-Op  Procedure:    Procedure(s) (LRB):  RIGHT TOTAL KNEE ARTHROPLASTY REVISION (Right)    Subjective: Patient tells me is better today but having some swelling.  There is still some drainage from superior portion of the wound.  He is able to get his prosthesis and walking.    Vitals:    Vitals:    01/02/20 1100   BP: 122/68   Pulse: 78   Resp: 18   Temp: 96.9 °F (36.1 °C)   SpO2: 98%       Exam: Edema is distal.  Not particular tender.  Some swelling in his there is scratching occurring here which is concerning to get infectious process/cellulitis in his skin.  2-3+ effusion which has recurred    Lab Results (last 24 hours)     ** No results found for the last 24 hours. **          ASSESSMENT:  Active Problems:    Painful total knee replacement, initial encounter (CMS/[facility])    Pyogenic arthritis of right knee joint (CMS/[facility])    Painful total knee replacement (CMS/[facility])        PLAN: We will get a Doppler ultrasound today.  The effusion is recurred and this is contributing to his drainage.  He has a wound that is been entered at least 4 times at this point.  We will stop his range of motion for a while that may limit his mobilization.  He is limited in where he can go in terms of any skilled facility or rehab we went through this last time.  After I left patient I was told by the  the patient said he was going to appeal his discharge which is pending at this point.  I think essentially I want the drain is to stop and he may reaspirated tomorrow but he is approaching ready for discharge.      Rufino Lu MD  01/02/20  2:45 PM

## 2020-01-02 NOTE — PLAN OF CARE
Problem: Patient Care Overview  Goal: Plan of Care Review  Outcome: Ongoing (interventions implemented as appropriate)  Flowsheets (Taken 1/2/2020 5739)  Progress: improving  Plan of Care Reviewed With: patient  Outcome Summary: Co tx with HAILE this am. Pt able to t/f sup to sit and back to sup with cond ind. Pt was able to sit EOB ind to veena L LE prosthetic. Pt stood with min/CGA and amb ~ 8ft to w/c. Pt wheeled to steps and went up/down 2 steps with B HR on each side with mod of 2 to go up and mod/max of 2 to come down. Pt cont to have saturated pressure bandage at end of tx which nsg was called to room and redressed incision site. Pt would cont to benefit from therapy upon DC.

## 2020-01-02 NOTE — PLAN OF CARE
Problem: Patient Care Overview  Goal: Plan of Care Review  Flowsheets  Taken 1/2/2020 1434 by Sondra Vega, LAZARA/L  Progress: improving  Outcome Summary: Pt tolerated tx well this date. Pt was cod I with bed mobility. Pt was CGA/min A with functional mobility/transfers. Pt completed UB/LB dressing. Continue OT POC.  Taken 1/1/2020 1002 by Abel Wheatley, PTA  Plan of Care Reviewed With: patient

## 2020-01-02 NOTE — THERAPY TREATMENT NOTE
Acute Care - Physical Therapy Treatment Note  [facility]     Patient Name: Michael Sorto  : 1955  MRN: [id number]  Today's Date: 2020  Onset of Illness/Injury or Date of Surgery: 19     Referring Physician: Dr. Lu    Admit Date: 2019    Visit Dx:    ICD-10-CM ICD-9-CM   1. Pyogenic arthritis of right knee joint, due to unspecified organism (CMS/[facility]) M00.9 711.06   2. Painful total knee replacement, initial encounter (CMS/[facility]) T84.84XA 996.77    Z96.659 V43.65     338.18   3. Impaired functional mobility, balance, gait, and endurance Z74.09 V49.89   4. Impaired mobility and ADLs Z74.09 799.89     Patient Active Problem List   Diagnosis   • Headache   • Nausea   • Nontraumatic intracerebral hemorrhage (CMS/[facility])   • Hypertension   • Morbid obesity (CMS/[facility])   • Cellulitis of left lower extremity   • Intracerebral hemorrhage (CMS/[facility])   • Chronic pain of right knee   • Painful total knee replacement, initial encounter (CMS/[facility])   • Pyogenic arthritis of right knee joint (CMS/[facility])   • Painful total knee replacement (CMS/[facility])       Therapy Treatment    Rehabilitation Treatment Summary     Row Name 2057 20 0950          Treatment Time/Intention    Discipline  occupational therapy assistant  -CS  physical therapy assistant  -TW     Document Type  therapy note (daily note)  -CS  therapy note (daily note)  -TW     Subjective Information  --  complains of;weakness;fatigue  -TW     Mode of Treatment  occupational therapy;co-treatment  -CS  co-treatment;physical therapy;occupational therapy  -TW     Patient/Family Observations  --  Pt agreeable to therapy.  -TW     Therapy Frequency (PT Clinical Impression)  daily  -CS  --     Patient Effort  good  -CS  good  -TW     Existing Precautions/Restrictions  fall  -CS  fall  -TW     Recorded by [CS] Sondra Vega COTA/LIAM 20 1253 [TW] Abel Wheatley PTA 20 1313     Row Name 2057 20 0950           Vital Signs    Pretreatment Heart Rate (beats/min)  --  84  -TW     Posttreatment Heart Rate (beats/min)  112  -CS  112  -TW     Pre SpO2 (%)  --  98  -TW     O2 Delivery Pre Treatment  --  room air  -TW     Post SpO2 (%)  97  -CS  97  -TW     O2 Delivery Post Treatment  room air  -CS  --     Pre Patient Position  Supine  -CS  Supine  -TW     Intra Patient Position  --  Standing  -TW     Post Patient Position  Supine  -CS  Supine  -TW     Recorded by [CS] Sondra Vega COTA/LIAM 01/02/20 1253 [TW] Abel Wheatley, PTA 01/02/20 1313     Row Name 01/02/20 0957 01/02/20 0950          Cognitive Assessment/Intervention- PT/OT    Affect/Mental Status (Cognitive)  WFL  -CS  WFL  -TW     Orientation Status (Cognition)  oriented x 4  -CS  oriented x 4  -TW     Follows Commands (Cognition)  WFL  -CS  follows one step commands;over 90% accuracy  -TW     Personal Safety Interventions  --  fall prevention program maintained;gait belt;nonskid shoes/slippers when out of bed  -TW     Recorded by [CS] Sondra Vega COTA/LIAM 01/02/20 1253 [TW] Abel Wheatley PTA 01/02/20 1313     Row Name 01/02/20 0950             Safety Issues, Functional Mobility    Impairments Affecting Function (Mobility)  balance;endurance/activity tolerance;pain;shortness of breath;strength  -TW      Recorded by [TW] Abel Wheatley PTA 01/02/20 1313      Row Name 01/02/20 0950             Mobility Assessment/Intervention    Right Lower Extremity (Weight-bearing Status)  (S) weight-bearing as tolerated (WBAT)  -TW      Recorded by [TW] Abel Wheatley PTA 01/02/20 1313      Row Name 01/02/20 0957 01/02/20 0950          Bed Mobility Assessment/Treatment    Rolling Left [place] (Bed Mobility)  --  conditional independence  -TW     Rolling Right [place] (Bed Mobility)  --  conditional independence  -TW     Scooting/Bridging [place] (Bed Mobility)  conditional independence  -CS  conditional independence  -TW     Supine-Sit  [place] (Bed Mobility)  conditional independence  -CS2  conditional independence  -TW     Sit-Supine [place] (Bed Mobility)  conditional independence  -CS2  conditional independence  -TW     Bed Mobility, Safety Issues  --  decreased use of legs for bridging/pushing  -TW     Assistive Device (Bed Mobility)  --  bed rails;head of bed elevated  -TW     Comment (Bed Mobility)  --  Pt able to sit EOB for 11 minutes to veena L LE prosthetic with no outer stocking needed this date.  -TW     Recorded by [CS] Sondra Vega COTA/L 01/02/20 1430  [CS2] Sondra Vega HAILE/LIAM 01/02/20 1253 [TW] Abel Wheatley, PTA 01/02/20 1313     Row Name 01/02/20 0957             Functional Mobility    Functional Mobility- Ind. Level  contact guard assist;minimum assist (75% patient effort)  -CS      Functional Mobility- Device  rolling walker  -CS      Functional Mobility-Distance (Feet)  17  -CS      Recorded by [CS] Sondra Vega COTA/L 01/02/20 1430      Row Name 01/02/20 0950             Transfer Assessment/Treatment    Transfer Assessment/Treatment  bed-chair transfer;chair-bed transfer;sit-stand transfer;stand-sit transfer  -TW      Recorded by [TW] Abel Wheatley, WADE 01/02/20 1313      Row Name 01/02/20 0957             Bed-Chair Transfer    Bed-Chair [place] (Transfers)  contact guard;minimum assist (75% patient effort) w/c  -CS      Assistive Device (Bed-Chair Transfers)  walker, front-wheeled  -CS      Recorded by [CS] Sondra Vega HAILE/L 01/02/20 1253      Row Name 01/02/20 0957 01/02/20 0950          Sit-Stand Transfer    Sit-Stand [place] (Transfers)  contact guard;minimum assist (75% patient effort)  -CS  contact guard;minimum assist (75% patient effort) Pt performed several trials to allow for pericare and donnin  -TW     Assistive Device (Sit-Stand Transfers)  walker, front-wheeled  -CS  walker, front-wheeled g shorts and shirt.  -TW     Recorded by [CS] Sondra Vega,  LAZARA/LIAM 01/02/20 1253 [TW] Abel Wheatley, PTA 01/02/20 1313     Row Name 01/02/20 0957 01/02/20 0950          Stand-Sit Transfer    Stand-Sit [place] (Transfers)  contact guard;minimum assist (75% patient effort)  -CS  contact guard;minimum assist (75% patient effort)  -TW     Assistive Device (Stand-Sit Transfers)  walker, front-wheeled  -CS  walker, front-wheeled  -TW     Recorded by [CS] Sondra Vega COTA/L 01/02/20 1253 [TW] Abel Wheatley, WADE 01/02/20 1313     Row Name 01/02/20 0950             Gait/Stairs Assessment/Training    Gait/Stairs Assessment/Training  gait/ambulation assistive device  -TW      [place] Level (Gait)  contact guard;minimum assist (75% patient effort)  -TW      Assistive Device (Gait)  walker, front-wheeled  -TW      Distance in Feet (Gait)  17ft  -TW      Pattern (Gait)  step-through  -TW      Deviations/Abnormal Patterns (Gait)  antalgic;base of support, wide  -TW      Bilateral Gait Deviations  forward flexed posture  -TW      Negotiation (Stairs)  handrail location;number of steps;ascending technique;descending technique  -TW      [place] Level (Stairs)  moderate assist (50% patient effort);maximum assist (25% patient effort);2 person assist  -TW      Handrail Location (Stairs)  both sides  -TW      Number of Steps (Stairs)  2  -TW      Ascending Technique (Stairs)  step-to-step mod of 2  -TW      Descending Technique (Stairs)  step-to-step mod/max of 2  -TW      Recorded by [TW] Abel Wheatley PTA 01/02/20 1313      Row Name 01/02/20 0957             ADL Assessment/Intervention    BADL Assessment/Intervention  upper body dressing;lower body dressing;toileting  -CS      Recorded by [CS] Sondra Vega COTA/L 01/02/20 1253      Row Name 01/02/20 0957             Upper Body Dressing Assessment/Training    Upper Body Dressing [place] Level  doff;don;set up  -CS      Upper Body Dressing Position  edge of bed sitting  -CS      Recorded by [CS]  Sondra Vega COTA/L 01/02/20 1253      Row Name 01/02/20 0957             Lower Body Dressing Assessment/Training    Lower Body Dressing [place] Level  doff;don;socks;pants/bottoms;shoes/slippers;moderate assist (50% patient effort)  -CS      Lower Body Dressing Position  edge of bed sitting  -CS      Recorded by [CS] Sondra Vega COTA/L 01/02/20 1253      Row Name 01/02/20 0957             Toileting Assessment/Training    [place] Level (Toileting)  perform perineal hygiene;dependent (less than 25% patient effort)  -CS      Toileting Position  supported standing  -CS      Recorded by [CS] Sondra Vega COTA/L 01/02/20 1253      Row Name 01/02/20 0957 01/02/20 0950          Static Sitting Balance    Level of [place] (Unsupported Sitting, Static Balance)  independent  -CS  independent  -TW     Sitting Position (Unsupported Sitting, Static Balance)  sitting on edge of bed  -CS  sitting on edge of bed  -TW     Time Able to Maintain Position (Unsupported Sitting, Static Balance)  more than 5 minutes  -CS  more than 5 minutes  -TW     Recorded by [CS] Sondra Vega COTA/LIAM 01/02/20 1430 [TW] Abel Wheatley PTA 01/02/20 1313     Row Name 01/02/20 0957 01/02/20 0950          Positioning and Restraints    Pre-Treatment Position  in bed  -CS  in bed  -TW     Post Treatment Position  bed  -CS  bed  -TW     In Bed  supine;call light within reach;encouraged to call for assist;exit alarm on  -CS  supine;call light within reach;encouraged to call for assist;exit alarm on  -TW     Recorded by [CS] Sondra Vega COTA/L 01/02/20 1430 [TW] Abel Wheatley PTA 01/02/20 1313     Row Name 01/02/20 0957 01/02/20 0950          Pain Scale: Numbers Pre/Post-Treatment    Pain Scale: Numbers, Pretreatment  3/10  -CS  3/10  -TW     Pain Scale: Numbers, Post-Treatment  4/10  -CS  4/10  -TW     Pain Location - Side  Right  -CS  Right  -TW     Pain Location  knee  -CS  knee  -TW     Recorded by  [CS] Sondra Vega COTA/LIAM 01/02/20 1430 [TW] Abel Wheatley PTA 01/02/20 1313     Row Name                Wound 12/26/19 1246 Right anterior knee Incision    Wound - Properties Group Date first assessed: 12/26/19 [AQ] Time first assessed: 1246 [AQ] Side: Right [AQ] Orientation: anterior [AQ] Location: knee [AQ] Primary Wound Type: Incision [AQ] Recorded by:  [AQ] Nguyen uDgan RN 12/26/19 1246    Row Name 01/02/20 0957             Outcome Summary/Treatment Plan (OT)    Daily Summary of Progress (OT)  progress toward functional goals is good  -CS      Plan for Continued Treatment (OT)  cont OT POC  -CS      Anticipated Discharge Disposition (OT)  anticipate therapy at next level of care  -CS      Recorded by [CS] Sondra Vega COTA/L 01/02/20 1430      Row Name 01/02/20 0950             Outcome Summary/Treatment Plan (PT)    Daily Summary of Progress (PT)  progress toward functional goals is good  -TW      Barriers to Overall Progress (PT)  still draining from R knee incision  -TW      Plan for Continued Treatment (PT)  Cont  -TW      Anticipated Discharge Disposition (PT)  anticipate therapy at next level of care  -TW      Recorded by [TW] Abel Wheatley PTA 01/02/20 1313        User Key  (r) = Recorded By, (t) = Taken By, (c) = Cosigned By    Initials Name Effective Dates Discipline    AQ Nguyen Dugan RN 10/17/16 -  Nurse    TW Abel Wheatley PTA 03/07/18 -  PT    CS Sondra Vega COTA/LIAM 03/07/18 -  OT          Wound 12/26/19 1246 Right anterior knee Incision (Active)   Dressing Appearance moist drainage 1/2/2020  8:59 AM   Closure [place] 1/2/2020  8:59 AM   Base pink 1/2/2020  8:59 AM   Drainage Amount moderate 1/2/2020  8:59 AM   Dressing Care, Wound dressing changed 1/2/2020  8:59 AM       Rehab Goal Summary     Row Name 01/02/20 0957 01/02/20 0950          Bed Mobility Goal 1 (PT)    Activity/Assistive Device (Bed Mobility Goal 1, PT)  --  bed mobility activities, all   -TW     Day Level/Cues Needed (Bed Mobility Goal 1, PT)  --  independent;conditional independence  -TW     Time Frame (Bed Mobility Goal 1, PT)  --  short term goal (STG);3 days  -TW     Progress/Outcomes (Bed Mobility Goal 1, PT)  --  (S) goal met  -TW        Transfer Goal 1 (PT)    Activity/Assistive Device (Transfer Goal 1, PT)  --  bed-to-chair/chair-to-bed;toilet  -TW     Day Level/Cues Needed (Transfer Goal 1, PT)  --  conditional independence  -TW     Time Frame (Transfer Goal 1, PT)  --  long term goal (LTG);by discharge;10 days  -TW     Progress/Outcome (Transfer Goal 1, PT)  --  goal not met  -TW        Gait Training Goal 1 (PT)    Activity/Assistive Device (Gait Training Goal 1, PT)  --  gait (walking locomotion);assistive device use;backward stepping;decrease fall risk;sidestepping;turning, left;turning, right;forward stepping;improve balance and speed;walker, rolling  -TW     Day Level (Gait Training Goal 1, PT)  --  conditional independence  -TW     Distance (Gait Goal 1, PT)  --  150 ft or more  -TW     Time Frame (Gait Training Goal 1, PT)  --  by discharge;3 weeks;long term goal (LTG)  -TW     Progress/Outcome (Gait Training Goal 1, PT)  --  goal not met  -TW        ROM Goal 1 (PT)    ROM Goal 1 (PT)  --  R knee 0-95 deg AROM  -TW     Time Frame (ROM Goal 1, PT)  --  long term goal (LTG);5 - 7 days  -TW     Progress/Outcome (ROM Goal 1, PT)  --  goal not met  -TW        Stairs Goal 1 (PT)    Activity/Assistive Device (Stairs Goal 1, PT)  --  ascending stairs;descending stairs;assistive device use  -TW     Day Level/Cues Needed (Stairs Goal 1, PT)  --  conditional independence  -TW     Time Frame (Stairs Goal 1, PT)  --  by discharge;3 weeks  -TW     Progress/Outcome (Stairs Goal 1, PT)  --  goal not met  -TW        Occupational Therapy Goals    Transfer Goal Selection (OT)  transfer, OT goal 1  -CS  --     Dressing Goal Selection (OT)  dressing, OT goal 1  -CS  --      Balance Goal Selection (OT)  balance, OT goal 1  -CS  --     Endurance Goal Selection (OT)  endurance, OT goal 1  -CS  --        Transfer Goal 1 (OT)    Activity/Assistive Device (Transfer Goal 1, OT)  toilet;commode, bedside with drop arms;commode, bedside without drop arms  -CS  --     [place] Level/Cues Needed (Transfer Goal 1, OT)  moderate assist (50-74% patient effort)  -CS  --     Time Frame (Transfer Goal 1, OT)  long term goal (LTG);by discharge  -CS  --     Progress/Outcome (Transfer Goal 1, OT)  goal not met  -CS  --        Dressing Goal 1 (OT)    Activity/Assistive Device (Dressing Goal 1, OT)  dressing skills, all  -CS  --     [place]/Cues Needed (Dressing Goal 1, OT)  minimum assist (75% or more patient effort)  -CS  --     Time Frame (Dressing Goal 1, OT)  long term goal (LTG);by discharge  -CS  --     Progress/Outcome (Dressing Goal 1, OT)  goal not met;good progress toward goal  -CS  --        Balance Goal 1 (OT)    Activity/Assistive Device (Balance Goal 1, OT)  sitting, dynamic 30 min no LOB and good safety  -CS  --     [place] Level/Cues Needed (Balance Goal 1, OT)  supervision required  -CS  --     Time Frame (Balance Goal 1, OT)  long term goal (LTG);by discharge  -CS  --     Progress/Outcomes (Balance Goal 1, OT)  goal met  -CS  --         Endurance Goal 1 (OT)    Activity Level (Endurance Goal 1, OT)  endurance 2 fair + 25 min functional task 3 or less rest breaks.   -CS  --     Time Frame (Endurance Goal 1, OT)  long term goal (LTG);by discharge  -CS  --     Progress/Outcome (Endurance Goal 1, OT)  goal met  -CS  --       User Key  (r) = Recorded By, (t) = Taken By, (c) = Cosigned By    Initials Name Provider Type Discipline    TW Abel Wheatley, PTA Physical Therapy Assistant PT    CS Sondra Vega COTA/LIAM Occupational Therapy Assistant OT              PT Recommendation and Plan  Anticipated Discharge Disposition (PT): anticipate therapy at next level of  care  Outcome Summary/Treatment Plan (PT)  Daily Summary of Progress (PT): progress toward functional goals is good  Barriers to Overall Progress (PT): still draining from R knee incision  Plan for Continued Treatment (PT): Cont  Anticipated Discharge Disposition (PT): anticipate therapy at next level of care  Plan of Care Reviewed With: patient  Progress: improving  Outcome Summary: Co tx with HAILE this am. Pt able to t/f sup to sit and back to sup with cond ind. Pt was able to sit EOB ind to veena L LE prosthetic. Pt stood with min/CGA and amb ~ 8ft to w/c. Pt wheeled to steps and went up/down 2 steps with B HR on each side with mod of 2 to go up and mod/max of 2 to come down. Pt cont to have saturated pressure bandage at end of tx which nsg was called to room and redressed incision site. Pt would cont to benefit from therapy upon DC.  Outcome Measures     Row Name 01/02/20 1400 01/02/20 0950 01/01/20 1002       How much help from another person do you currently need...    Turning from your back to your side while in flat bed without using bedrails?  --  4  -TW  4  -TW    Moving from lying on back to sitting on the side of a flat bed without bedrails?  --  4  -TW  4  -TW    Moving to and from a bed to a chair (including a wheelchair)?  --  3  -TW  3  -TW    Standing up from a chair using your arms (e.g., wheelchair, bedside chair)?  --  3  -TW  3  -TW    Climbing 3-5 steps with a railing?  --  2  -TW  1  -TW    To walk in hospital room?  --  3  -TW  3  -TW    AM-PAC 6 Clicks Score (PT)  --  19  -TW  18  -TW       How much help from another is currently needed...    Putting on and taking off regular lower body clothing?  1  -CS  --  --    Bathing (including washing, rinsing, and drying)  2  -CS  --  --    Toileting (which includes using toilet bed pan or urinal)  2  -CS  --  --    Putting on and taking off regular upper body clothing  3  -CS  --  --    Taking care of personal grooming (such as brushing teeth)  3   -CS  --  --    Eating meals  3  -CS  --  --    AM-PAC 6 Clicks Score (OT)  14  -CS  --  --       Functional Assessment    Outcome Measure Options  --  AM-PAC 6 Clicks Basic Mobility (PT)  -TW  AM-PAC 6 Clicks Basic Mobility (PT)  -TW    Row Name 01/01/20 0844 01/01/20 0843 12/31/19 1401       How much help from another person do you currently need...    Turning from your back to your side while in flat bed without using bedrails?  --  --  4  -TW    Moving from lying on back to sitting on the side of a flat bed without bedrails?  --  --  4  -TW    Moving to and from a bed to a chair (including a wheelchair)?  --  --  2  -TW    Standing up from a chair using your arms (e.g., wheelchair, bedside chair)?  --  --  3  -TW    Climbing 3-5 steps with a railing?  --  --  1  -TW    To walk in hospital room?  --  --  2  -TW    AM-PAC 6 Clicks Score (PT)  --  --  16  -TW       How much help from another is currently needed...    Putting on and taking off regular lower body clothing?  1  -LITA  2  -TO  --    Bathing (including washing, rinsing, and drying)  2  -LITA  2  -TO  --    Toileting (which includes using toilet bed pan or urinal)  2  -LITA  2  -TO  --    Putting on and taking off regular upper body clothing  3  -LITA  3  -TO  --    Taking care of personal grooming (such as brushing teeth)  3  -LITA  3  -TO  --    Eating meals  3  -LITA  4  -TO  --    AM-PAC 6 Clicks Score (OT)  14  -LITA  16  -TO  --       Functional Assessment    Outcome Measure Options  --  --  AM-PAC 6 Clicks Basic Mobility (PT)  -TW    Row Name 12/31/19 1059             How much help from another is currently needed...    Putting on and taking off regular lower body clothing?  1  -BB      Bathing (including washing, rinsing, and drying)  2  -BB      Toileting (which includes using toilet bed pan or urinal)  2  -BB      Putting on and taking off regular upper body clothing  3  -BB      Taking care of personal grooming (such as brushing teeth)  3  -BB      Eating  meals  3  -BB      AM-PAC 6 Clicks Score (OT)  14  -BB        User Key  (r) = Recorded By, (t) = Taken By, (c) = Cosigned By    Initials Name Provider Type    TW Abel Wheatley PTA Physical Therapy Assistant    Julia Ramirez, HAILE/L Occupational Therapy Assistant    TO Talon Cavanaugh HAILE/L Occupational Therapy Assistant    Sondra Sweeney, HAILE/L Occupational Therapy Assistant    Mikayla Orr OTA Occupational Therapy Assistant         Time Calculation:   PT Charges     Row Name 01/02/20 1313             Time Calculation    Start Time  0950  -TW      Stop Time  1101  -TW      Time Calculation (min)  71 min  -TW      PT Non-Billable Time (min)  30 min  -TW      PT Received On  01/02/20  -TW      PT Goal Re-Cert Due Date  01/08/20  -TW         Time Calculation- PT    Total Timed Code Minutes- PT  41 minute(s)  -TW        User Key  (r) = Recorded By, (t) = Taken By, (c) = Cosigned By    Initials Name Provider Type     Abel Whealtey PTA Physical Therapy Assistant        Therapy Charges for Today     Code Description Service Date Service Provider Modifiers Qty    80129863683 HC GAIT TRAINING EA 15 MIN 1/1/2020 Abel Wheatley, WADE GP 1    77159680612 HC PT THERAPEUTIC ACT EA 15 MIN 1/1/2020 Abel Wheatley PTA GP 2    05193493252 HC GAIT TRAINING EA 15 MIN 1/2/2020 Abel Wheatley PTA GP 1    66775402589 HC PT THERAPEUTIC ACT EA 15 MIN 1/2/2020 Abel Wheatley PTA GP 2    04889369431 HC PT THER SUPP EA 15 MIN 1/2/2020 Abel Wheatley, PTA GP 2          PT G-Codes  Outcome Measure Options: AM-PAC 6 Clicks Basic Mobility (PT)  AM-PAC 6 Clicks Score (PT): 19  AM-PAC 6 Clicks Score (OT): 14    Abel Wheatley PTA  1/2/2020

## 2020-01-02 NOTE — PLAN OF CARE
Problem: Patient Care Overview  Goal: Plan of Care Review  Outcome: Ongoing (interventions implemented as appropriate)  Flowsheets  Taken 1/2/2020 1621 by Mark Anthony Adam RN  Progress: no change  Outcome Summary: pt vss.Pt dressing changed multiple times this shift.  Taken 1/2/2020 0950 by Abel Wheatley PTA  Plan of Care Reviewed With: patient

## 2020-01-02 NOTE — SIGNIFICANT NOTE
01/02/20 1518   Rehab Treatment   Reason Treatment Not Performed unavailable for treatment  (Pt off floor for testing.)

## 2020-01-02 NOTE — THERAPY TREATMENT NOTE
Acute Care - Occupational Therapy Treatment Note  [facility]     Patient Name: Michael Sorto  : 1955  MRN: [id number]  Today's Date: 2020  Onset of Illness/Injury or Date of Surgery: 19  Date of Referral to OT: 19  Referring Physician: Dr. Lu    Admit Date: 2019       ICD-10-CM ICD-9-CM   1. Pyogenic arthritis of right knee joint, due to unspecified organism (CMS/HCC) M00.9 711.06   2. Painful total knee replacement, initial encounter (CMS/HCC) T84.84XA 996.77    Z96.659 V43.65     338.18   3. Impaired functional mobility, balance, gait, and endurance Z74.09 V49.89   4. Impaired mobility and ADLs Z74.09 799.89     Patient Active Problem List   Diagnosis   • Headache   • Nausea   • Nontraumatic intracerebral hemorrhage (CMS/HCC)   • Hypertension   • Morbid obesity (CMS/HCC)   • Cellulitis of left lower extremity   • Intracerebral hemorrhage (CMS/HCC)   • Chronic pain of right knee   • Painful total knee replacement, initial encounter (CMS/HCC)   • Pyogenic arthritis of right knee joint (CMS/HCC)   • Painful total knee replacement (CMS/HCC)     Past Medical History:   Diagnosis Date   • Aortic valvar stenosis    • Cellulitis of right lower extremity    • Chronic pain syndrome    • CVA (cerebral vascular accident) (CMS/HCC)    • Depressive disorder    • GERD (gastroesophageal reflux disease)    • Hypertension    • Injury of lower leg    • Kidney stone    • Morbid obesity (CMS/HCC)    • Painful total knee replacement, initial encounter (CMS/HCC) 3/6/2019   • Pyogenic arthritis of right knee joint (CMS/HCC) 3/6/2019   • Right knee pain      Past Surgical History:   Procedure Laterality Date   • AMPUTATION Left     BKA   • AORTIC VALVE REPAIR/REPLACEMENT     • APPENDECTOMY     • CARDIAC CATHETERIZATION     • REPLACEMENT TOTAL KNEE Right    • TONSILLECTOMY     • TOTAL KNEE  PROSTHESIS REMOVAL W/ SPACER INSERTION Right 2019    Procedure: REMOVAL OF TOTAL KNEE  COMPONENTS RIGHT KNEE WITH INSERTION OF CEMENT ANTIBIOTIC SPACER;  Surgeon: Rufino Lu MD;  Location: [facility];  Service: Orthopedics   • TOTAL KNEE ARTHROPLASTY REVISION Right 12/26/2019    Procedure: RIGHT TOTAL KNEE ARTHROPLASTY REVISION;  Surgeon: Rufino Lu MD;  Location: [facility];  Service: Orthopedics       Therapy Treatment    Rehabilitation Treatment Summary     Row Name 01/02/20 0957 01/02/20 0950          Treatment Time/Intention    Discipline  occupational therapy assistant  -CS  physical therapy assistant  -TW     Document Type  therapy note (daily note)  -CS  therapy note (daily note)  -TW     Subjective Information  --  complains of;weakness;fatigue  -TW     Mode of Treatment  occupational therapy;co-treatment  -CS  co-treatment;physical therapy;occupational therapy  -TW     Patient/Family Observations  --  Pt agreeable to therapy.  -TW     Therapy Frequency (PT Clinical Impression)  daily  -CS  --     Patient Effort  good  -CS  good  -TW     Existing Precautions/Restrictions  fall  -CS  fall  -TW     Recorded by [CS] Sondra Vega COTA/L 01/02/20 1253 [TW] Abel Wheatley PTA 01/02/20 1313     Row Name 01/02/20 0957 01/02/20 0950          Vital Signs    Pretreatment Heart Rate (beats/min)  --  84  -TW     Posttreatment Heart Rate (beats/min)  112  -CS  112  -TW     Pre SpO2 (%)  --  98  -TW     O2 Delivery Pre Treatment  --  room air  -TW     Post SpO2 (%)  97  -CS  97  -TW     O2 Delivery Post Treatment  room air  -CS  --     Pre Patient Position  Supine  -CS  Supine  -TW     Intra Patient Position  --  Standing  -TW     Post Patient Position  Supine  -CS  Supine  -TW     Recorded by [CS] Sondra Vega COTA/L 01/02/20 1253 [TW] Abel Wheatley PTA 01/02/20 1313     Row Name 01/02/20 0957 01/02/20 0950          Cognitive Assessment/Intervention- PT/OT    Affect/Mental Status (Cognitive)  WFL  -CS  WFL  -TW     Orientation Status (Cognition)  oriented x 4   -CS  oriented x 4  -TW     Follows Commands (Cognition)  WFL  -CS  follows one step commands;over 90% accuracy  -TW     Personal Safety Interventions  --  fall prevention program maintained;gait belt;nonskid shoes/slippers when out of bed  -TW     Recorded by [CS] Sondra Vega COTA/LIAM 01/02/20 1253 [TW] Abel Wheatley, PTA 01/02/20 1313     Row Name 01/02/20 0950             Safety Issues, Functional Mobility    Impairments Affecting Function (Mobility)  balance;endurance/activity tolerance;pain;shortness of breath;strength  -TW      Recorded by [TW] Abel Wheatley PTA 01/02/20 1313      Row Name 01/02/20 0950             Mobility Assessment/Intervention    Right Lower Extremity (Weight-bearing Status)  (S) weight-bearing as tolerated (WBAT)  -TW      Recorded by [TW] Abel Wheatley PTA 01/02/20 1313      Row Name 01/02/20 0957 01/02/20 0950          Bed Mobility Assessment/Treatment    Rolling Left [place] (Bed Mobility)  --  conditional independence  -TW     Rolling Right [place] (Bed Mobility)  --  conditional independence  -TW     Scooting/Bridging [place] (Bed Mobility)  conditional independence  -CS  conditional independence  -TW     Supine-Sit [place] (Bed Mobility)  conditional independence  -CS2  conditional independence  -TW     Sit-Supine [place] (Bed Mobility)  conditional independence  -CS2  conditional independence  -TW     Bed Mobility, Safety Issues  --  decreased use of legs for bridging/pushing  -TW     Assistive Device (Bed Mobility)  --  bed rails;head of bed elevated  -TW     Comment (Bed Mobility)  --  Pt able to sit EOB for 11 minutes to veena L LE prosthetic with no outer stocking needed this date.  -TW     Recorded by [CS] Sondra Vega COTA/L 01/02/20 1430  [CS2] Sondra Vega COTA/LIAM 01/02/20 1253 [TW] Abel Wheatley PTA 01/02/20 1313     Row Name 01/02/20 0957             Functional Mobility    Functional Mobility- Ind. Level   contact guard assist;minimum assist (75% patient effort)  -CS      Functional Mobility- Device  rolling walker  -CS      Functional Mobility-Distance (Feet)  17  -CS      Recorded by [CS] Sondra Vega COTA/L 01/02/20 1430      Row Name 01/02/20 0950             Transfer Assessment/Treatment    Transfer Assessment/Treatment  bed-chair transfer;chair-bed transfer;sit-stand transfer;stand-sit transfer  -TW      Recorded by [TW] Abel Wheatley PTA 01/02/20 1313      Row Name 01/02/20 0957             Bed-Chair Transfer    Bed-Chair [place] (Transfers)  contact guard;minimum assist (75% patient effort) w/c  -CS      Assistive Device (Bed-Chair Transfers)  walker, front-wheeled  -CS      Recorded by [CS] Sondra Vega COTA/L 01/02/20 1253      Row Name 01/02/20 0957 01/02/20 0950          Sit-Stand Transfer    Sit-Stand [place] (Transfers)  contact guard;minimum assist (75% patient effort)  -CS  contact guard;minimum assist (75% patient effort) Pt performed several trials to allow for pericare and donnin  -TW     Assistive Device (Sit-Stand Transfers)  walker, front-wheeled  -CS  walker, front-wheeled g shorts and shirt.  -TW     Recorded by [CS] Sondra Vega COTA/LIAM 01/02/20 1253 [TW] Abel Wheatley PTA 01/02/20 1313     Row Name 01/02/20 0957 01/02/20 0950          Stand-Sit Transfer    Stand-Sit [place] (Transfers)  contact guard;minimum assist (75% patient effort)  -CS  contact guard;minimum assist (75% patient effort)  -TW     Assistive Device (Stand-Sit Transfers)  walker, front-wheeled  -CS  walker, front-wheeled  -TW     Recorded by [CS] Sondra Vega COTA/LIAM 01/02/20 1253 [TW] Abel Wheatley PTA 01/02/20 1313     Row Name 01/02/20 0950             Gait/Stairs Assessment/Training    Gait/Stairs Assessment/Training  gait/ambulation assistive device  -TW      [place] Level (Gait)  contact guard;minimum assist (75% patient effort)  -TW      Assistive Device  (Gait)  walker, front-wheeled  -TW      Distance in Feet (Gait)  17ft  -TW      Pattern (Gait)  step-through  -TW      Deviations/Abnormal Patterns (Gait)  antalgic;base of support, wide  -TW      Bilateral Gait Deviations  forward flexed posture  -TW      Negotiation (Stairs)  handrail location;number of steps;ascending technique;descending technique  -TW      [place] Level (Stairs)  moderate assist (50% patient effort);maximum assist (25% patient effort);2 person assist  -TW      Handrail Location (Stairs)  both sides  -TW      Number of Steps (Stairs)  2  -TW      Ascending Technique (Stairs)  step-to-step mod of 2  -TW      Descending Technique (Stairs)  step-to-step mod/max of 2  -TW      Recorded by [TW] Abel Wheatley PTA 01/02/20 1313      Row Name 01/02/20 0957             ADL Assessment/Intervention    BADL Assessment/Intervention  upper body dressing;lower body dressing;toileting  -CS      Recorded by [CS] Sondra Vega COTA/L 01/02/20 1253      Row Name 01/02/20 0957             Upper Body Dressing Assessment/Training    Upper Body Dressing [place] Level  doff;don;set up  -CS      Upper Body Dressing Position  edge of bed sitting  -CS      Recorded by [CS] Sondra Vega COTA/L 01/02/20 1253      Row Name 01/02/20 0957             Lower Body Dressing Assessment/Training    Lower Body Dressing [place] Level  doff;don;socks;pants/bottoms;shoes/slippers;moderate assist (50% patient effort)  -CS      Lower Body Dressing Position  edge of bed sitting  -CS      Recorded by [CS] Sondra Vega COTA/L 01/02/20 1253      Row Name 01/02/20 0957             Toileting Assessment/Training    [place] Level (Toileting)  perform perineal hygiene;dependent (less than 25% patient effort)  -CS      Toileting Position  supported standing  -CS      Recorded by [CS] Sondra Vega COTA/L 01/02/20 1253      Row Name 01/02/20 0957 01/02/20 0950          Static Sitting Balance    Level  of [place] (Unsupported Sitting, Static Balance)  independent  -CS  independent  -TW     Sitting Position (Unsupported Sitting, Static Balance)  sitting on edge of bed  -CS  sitting on edge of bed  -TW     Time Able to Maintain Position (Unsupported Sitting, Static Balance)  more than 5 minutes  -CS  more than 5 minutes  -TW     Recorded by [CS] Sondra Vega COTA/L 01/02/20 1430 [TW] Abel hWeatley PTA 01/02/20 1313     Row Name 01/02/20 0957 01/02/20 0950          Positioning and Restraints    Pre-Treatment Position  in bed  -CS  in bed  -TW     Post Treatment Position  bed  -CS  bed  -TW     In Bed  supine;call light within reach;encouraged to call for assist;exit alarm on  -CS  supine;call light within reach;encouraged to call for assist;exit alarm on  -TW     Recorded by [CS] Sondra Vega COTA/L 01/02/20 1430 [TW] Abel Wheatley PTA 01/02/20 1313     Row Name 01/02/20 0957 01/02/20 0950          Pain Scale: Numbers Pre/Post-Treatment    Pain Scale: Numbers, Pretreatment  3/10  -CS  3/10  -TW     Pain Scale: Numbers, Post-Treatment  4/10  -CS  4/10  -TW     Pain Location - Side  Right  -CS  Right  -TW     Pain Location  knee  -CS  knee  -TW     Recorded by [CS] Sondra Vega COTA/L 01/02/20 1430 [TW] Abel Wheatley PTA 01/02/20 1313     Row Name                Wound 12/26/19 1246 Right anterior knee Incision    Wound - Properties Group Date first assessed: 12/26/19 [AQ] Time first assessed: 1246 [AQ] Side: Right [AQ] Orientation: anterior [AQ] Location: knee [AQ] Primary Wound Type: Incision [AQ] Recorded by:  [AQ] Nguyen Dugan RN 12/26/19 1246    Row Name 01/02/20 0957             Outcome Summary/Treatment Plan (OT)    Daily Summary of Progress (OT)  progress toward functional goals is good  -CS      Plan for Continued Treatment (OT)  cont OT POC  -CS      Anticipated Discharge Disposition (OT)  anticipate therapy at next level of care  -CS      Recorded by [CS]  Sondra Vega COTA/L 01/02/20 1430      Row Name 01/02/20 0950             Outcome Summary/Treatment Plan (PT)    Daily Summary of Progress (PT)  progress toward functional goals is good  -TW      Barriers to Overall Progress (PT)  still draining from R knee incision  -TW      Plan for Continued Treatment (PT)  Cont  -TW      Anticipated Discharge Disposition (PT)  anticipate therapy at next level of care  -TW      Recorded by [TW] Abel Wheatley, PTA 01/02/20 1313        User Key  (r) = Recorded By, (t) = Taken By, (c) = Cosigned By    Initials Name Effective Dates Discipline    AQ Nguyen Dugan RN 10/17/16 -  Nurse    TW Abel Wheatley, PTA 03/07/18 -  PT    CS Sondra Vega COTA/LIAM 03/07/18 -  OT        Wound 12/26/19 1246 Right anterior knee Incision (Active)   Dressing Appearance moist drainage 1/2/2020  8:59 AM   Closure [place] 1/2/2020  8:59 AM   Base pink 1/2/2020  8:59 AM   Drainage Amount moderate 1/2/2020  8:59 AM   Dressing Care, Wound dressing changed 1/2/2020  8:59 AM     Rehab Goal Summary     Row Name 01/02/20 0957             Occupational Therapy Goals    Transfer Goal Selection (OT)  transfer, OT goal 1  -CS      Dressing Goal Selection (OT)  dressing, OT goal 1  -CS      Balance Goal Selection (OT)  balance, OT goal 1  -CS      Endurance Goal Selection (OT)  endurance, OT goal 1  -CS         Transfer Goal 1 (OT)    Activity/Assistive Device (Transfer Goal 1, OT)  toilet;commode, bedside with drop arms;commode, bedside without drop arms  -CS      [place] Level/Cues Needed (Transfer Goal 1, OT)  moderate assist (50-74% patient effort)  -CS      Time Frame (Transfer Goal 1, OT)  long term goal (LTG);by discharge  -CS      Progress/Outcome (Transfer Goal 1, OT)  goal not met  -CS         Dressing Goal 1 (OT)    Activity/Assistive Device (Dressing Goal 1, OT)  dressing skills, all  -CS      [place]/Cues Needed (Dressing Goal 1, OT)  minimum assist (75% or more patient  effort)  -CS      Time Frame (Dressing Goal 1, OT)  long term goal (LTG);by discharge  -CS      Progress/Outcome (Dressing Goal 1, OT)  goal not met;good progress toward goal  -CS         Balance Goal 1 (OT)    Activity/Assistive Device (Balance Goal 1, OT)  sitting, dynamic 30 min no LOB and good safety  -CS      [place] Level/Cues Needed (Balance Goal 1, OT)  supervision required  -CS      Time Frame (Balance Goal 1, OT)  long term goal (LTG);by discharge  -CS      Progress/Outcomes (Balance Goal 1, OT)  goal met  -CS          Endurance Goal 1 (OT)    Activity Level (Endurance Goal 1, OT)  endurance 2 fair + 25 min functional task 3 or less rest breaks.   -CS      Time Frame (Endurance Goal 1, OT)  long term goal (LTG);by discharge  -CS      Progress/Outcome (Endurance Goal 1, OT)  goal met  -CS        User Key  (r) = Recorded By, (t) = Taken By, (c) = Cosigned By    Initials Name Provider Type Discipline    CS Sondra Vega COTA/LIAM Occupational Therapy Assistant OT            OT Recommendation and Plan  Outcome Summary/Treatment Plan (OT)  Daily Summary of Progress (OT): progress toward functional goals is good  Plan for Continued Treatment (OT): cont OT POC  Anticipated Discharge Disposition (OT): anticipate therapy at next level of care  Therapy Frequency (OT Eval): daily  Daily Summary of Progress (OT): progress toward functional goals is good  Outcome Summary: Pt tolerated tx well this date. Pt was cod I with bed mobility. Pt was CGA/min A with functional mobility/transfers. Pt completed UB/LB dressing. Continue OT POC.  Outcome Measures     Row Name 01/02/20 1400 01/01/20 1002 01/01/20 0844       How much help from another person do you currently need...    Turning from your back to your side while in flat bed without using bedrails?  --  4  -TW  --    Moving from lying on back to sitting on the side of a flat bed without bedrails?  --  4  -TW  --    Moving to and from a bed to a chair (including a  wheelchair)?  --  3  -TW  --    Standing up from a chair using your arms (e.g., wheelchair, bedside chair)?  --  3  -TW  --    Climbing 3-5 steps with a railing?  --  1  -TW  --    To walk in hospital room?  --  3  -TW  --    AM-PAC 6 Clicks Score (PT)  --  18  -TW  --       How much help from another is currently needed...    Putting on and taking off regular lower body clothing?  1  -CS  --  1  -LITA    Bathing (including washing, rinsing, and drying)  2  -CS  --  2  -LITA    Toileting (which includes using toilet bed pan or urinal)  2  -CS  --  2  -LITA    Putting on and taking off regular upper body clothing  3  -CS  --  3  -LITA    Taking care of personal grooming (such as brushing teeth)  3  -CS  --  3  -LITA    Eating meals  3  -CS  --  3  -LITA    AM-PAC 6 Clicks Score (OT)  14  -CS  --  14  -LITA       Functional Assessment    Outcome Measure Options  --  AM-PAC 6 Clicks Basic Mobility (PT)  -TW  --    Row Name 01/01/20 0843 12/31/19 1401 12/31/19 1059       How much help from another person do you currently need...    Turning from your back to your side while in flat bed without using bedrails?  --  4  -TW  --    Moving from lying on back to sitting on the side of a flat bed without bedrails?  --  4  -TW  --    Moving to and from a bed to a chair (including a wheelchair)?  --  2  -TW  --    Standing up from a chair using your arms (e.g., wheelchair, bedside chair)?  --  3  -TW  --    Climbing 3-5 steps with a railing?  --  1  -TW  --    To walk in hospital room?  --  2  -TW  --    AM-PAC 6 Clicks Score (PT)  --  16  -TW  --       How much help from another is currently needed...    Putting on and taking off regular lower body clothing?  2  -TO  --  1  -BB    Bathing (including washing, rinsing, and drying)  2  -TO  --  2  -BB    Toileting (which includes using toilet bed pan or urinal)  2  -TO  --  2  -BB    Putting on and taking off regular upper body clothing  3  -TO  --  3  -BB    Taking care of personal grooming  (such as brushing teeth)  3  -TO  --  3  -BB    Eating meals  4  -TO  --  3  -BB    AM-PAC 6 Clicks Score (OT)  16  -TO  --  14  -BB       Functional Assessment    Outcome Measure Options  --  AM-PAC 6 Clicks Basic Mobility (PT)  -TW  --      User Key  (r) = Recorded By, (t) = Taken By, (c) = Cosigned By    Initials Name Provider Type    TW Abel Wheatley, PTA Physical Therapy Assistant    BB Julia Meyer, HAILE/L Occupational Therapy Assistant    Talon Carrillo HAILE/L Occupational Therapy Assistant    CS Sondra Vega HAILE/L Occupational Therapy Assistant    Mikayla Orr OTA Occupational Therapy Assistant           Time Calculation:   Time Calculation- OT     Row Name 01/02/20 1320             Time Calculation- OT    OT Start Time  0957  -CS      OT Stop Time  1101  -CS      OT Time Calculation (min)  64 min  -CS      Total Timed Code Minutes- OT  30 minute(s)  -CS      OT Non-Billable Time (min)  34 min co-tx with PTA.  -CS      OT Received On  01/02/20  -CS        User Key  (r) = Recorded By, (t) = Taken By, (c) = Cosigned By    Initials Name Provider Type    CS Sondra Vega HAILE/LIAM Occupational Therapy Assistant        Therapy Charges for Today     Code Description Service Date Service Provider Modifiers Qty    09100491819 HC OT SELF CARE/MGMT/TRAIN EA 15 MIN 1/2/2020 Sondra Vega COTA/L GO 1    48219977204 HC OT THERAPEUTIC ACT EA 15 MIN 1/2/2020 Sondra Vega COTA/L GO 1    29803126981 HC OT THER SUPP EA 15 MIN 1/2/2020 Sondra Vega COTA/L GO 2               LAZARA Emerson/LIAM  1/2/2020

## 2020-01-03 ENCOUNTER — TELEPHONE (OUTPATIENT)
Dept: ORTHOPEDIC SURGERY | Facility: CLINIC | Age: 65
End: 2020-01-03

## 2020-01-03 LAB
DEPRECATED RDW RBC AUTO: 48 FL (ref 37–54)
ERYTHROCYTE [DISTWIDTH] IN BLOOD BY AUTOMATED COUNT: 14.2 % (ref 12.3–15.4)
HCT VFR BLD AUTO: 26.1 % (ref 37.5–51)
HGB BLD-MCNC: 8.4 G/DL (ref 13–17.7)
MCH RBC QN AUTO: 29.9 PG (ref 26.6–33)
MCHC RBC AUTO-ENTMCNC: 32.2 G/DL (ref 31.5–35.7)
MCV RBC AUTO: 92.9 FL (ref 79–97)
PLATELET # BLD AUTO: 138 10*3/MM3 (ref 140–450)
PMV BLD AUTO: 9.7 FL (ref 6–12)
RBC # BLD AUTO: 2.81 10*6/MM3 (ref 4.14–5.8)
WBC NRBC COR # BLD: 4.42 10*3/MM3 (ref 3.4–10.8)

## 2020-01-03 PROCEDURE — 97110 THERAPEUTIC EXERCISES: CPT

## 2020-01-03 PROCEDURE — 97530 THERAPEUTIC ACTIVITIES: CPT

## 2020-01-03 PROCEDURE — 85027 COMPLETE CBC AUTOMATED: CPT | Performed by: ORTHOPAEDIC SURGERY

## 2020-01-03 PROCEDURE — 97116 GAIT TRAINING THERAPY: CPT

## 2020-01-03 RX ORDER — FERROUS SULFATE TAB EC 324 MG (65 MG FE EQUIVALENT) 324 (65 FE) MG
324 TABLET DELAYED RESPONSE ORAL
Qty: 30 TABLET | Refills: 0 | Status: SHIPPED | OUTPATIENT
Start: 2020-01-03

## 2020-01-03 RX ORDER — OXYCODONE HYDROCHLORIDE 5 MG/1
5 TABLET ORAL EVERY 4 HOURS PRN
Qty: 30 TABLET | Refills: 0 | Status: SHIPPED | OUTPATIENT
Start: 2020-01-03 | End: 2020-01-05

## 2020-01-03 RX ADMIN — FERROUS SULFATE TAB EC 324 MG (65 MG FE EQUIVALENT) 324 MG: 324 (65 FE) TABLET DELAYED RESPONSE at 09:59

## 2020-01-03 RX ADMIN — ACETAMINOPHEN 1000 MG: 500 TABLET ORAL at 12:57

## 2020-01-03 RX ADMIN — ACETAMINOPHEN 1000 MG: 500 TABLET ORAL at 21:12

## 2020-01-03 RX ADMIN — ACETAMINOPHEN 1000 MG: 500 TABLET ORAL at 10:01

## 2020-01-03 RX ADMIN — BUSPIRONE HYDROCHLORIDE 5 MG: 5 TABLET ORAL at 21:08

## 2020-01-03 RX ADMIN — CARVEDILOL 3.12 MG: 3.12 TABLET, FILM COATED ORAL at 17:58

## 2020-01-03 RX ADMIN — ASPIRIN 325 MG: 325 TABLET, DELAYED RELEASE ORAL at 21:09

## 2020-01-03 RX ADMIN — FAMOTIDINE 40 MG: 40 TABLET ORAL at 10:00

## 2020-01-03 RX ADMIN — TERAZOSIN HYDROCHLORIDE ANHYDROUS 5 MG: 5 CAPSULE ORAL at 21:08

## 2020-01-03 RX ADMIN — CARVEDILOL 3.12 MG: 3.12 TABLET, FILM COATED ORAL at 09:59

## 2020-01-03 RX ADMIN — ASPIRIN 325 MG: 325 TABLET, DELAYED RELEASE ORAL at 10:00

## 2020-01-03 RX ADMIN — SODIUM CHLORIDE, PRESERVATIVE FREE 3 ML: 5 INJECTION INTRAVENOUS at 21:09

## 2020-01-03 RX ADMIN — BUSPIRONE HYDROCHLORIDE 5 MG: 5 TABLET ORAL at 10:00

## 2020-01-03 NOTE — TELEPHONE ENCOUNTER
FREDA ALBARADO WITH [facility]TS [facility]   [phone]      STATED SHE RECEIVED ORDER FOR POST OP BUT PATIENT LIVES IN [facility]. SHE IS WANTING TO MAKE SURE THIS REFERRAL IS CORRECT OR IF PATIENT NEEDED THERAPY CLOSER TO HOME.

## 2020-01-03 NOTE — PLAN OF CARE
Problem: Patient Care Overview  Goal: Plan of Care Review  Outcome: Ongoing (interventions implemented as appropriate)  Flowsheets (Taken 1/3/2020 0900)  Progress: no change  Plan of Care Reviewed With: patient  Outcome Summary: Pt demonstrates good ability to move in bed using handrails. Pt recieved new R knee immobilizer this date but when several attempts were made to adjust brace to fit pt, it was deemed pt needs larger brace to accomodate pt's girth of upper thigh area. Pt was able to perform LE therex to exclude R knee flex due to MD placing pressure wrap around pt's knee. Pt would cont to benefit from therapy upon DC.

## 2020-01-03 NOTE — THERAPY TREATMENT NOTE
Acute Care - Physical Therapy Treatment Note  [facility]     Patient Name: Michael Sorto  : 1955  MRN: [id number]  Today's Date: 1/3/2020  Onset of Illness/Injury or Date of Surgery: 19     Referring Physician: Dr. Lu    Admit Date: 2019    Visit Dx:    ICD-10-CM ICD-9-CM   1. Pyogenic arthritis of right knee joint, due to unspecified organism (CMS/[facility]) M00.9 711.06   2. Painful total knee replacement, initial encounter (CMS/[facility]) T84.84XA 996.77    Z96.659 V43.65     338.18   3. Impaired functional mobility, balance, gait, and endurance Z74.09 V49.89   4. Impaired mobility and ADLs Z74.09 799.89     Patient Active Problem List   Diagnosis   • Headache   • Nausea   • Nontraumatic intracerebral hemorrhage (CMS/[facility])   • Hypertension   • Morbid obesity (CMS/[facility])   • Cellulitis of left lower extremity   • Intracerebral hemorrhage (CMS/[facility])   • Chronic pain of right knee   • Painful total knee replacement, initial encounter (CMS/[facility])   • Pyogenic arthritis of right knee joint (CMS/[facility])   • Painful total knee replacement (CMS/[facility])       Therapy Treatment    Rehabilitation Treatment Summary     Row Name 20 0900             Treatment Time/Intention    Discipline  physical therapy assistant  -TW      Document Type  therapy note (daily note)  -TW      Subjective Information  no complaints  -TW      Mode of Treatment  physical therapy;co-treatment;occupational therapy  -TW      Patient/Family Observations  Pt agreeable to tx at this time.  -TW      Patient Effort  adequate  -TW      Existing Precautions/Restrictions  fall  -TW      Recorded by [TW] Abel Wheatley PTA 20 1115      Row Name 20 0900             Vital Signs    Pre Systolic BP Rehab  140  -TW      Pre Treatment Diastolic BP  78  -TW      Pretreatment Heart Rate (beats/min)  72  -TW      Posttreatment Heart Rate (beats/min)  80  -TW      Pre SpO2 (%)  99  -TW      O2 Delivery Pre Treatment  room air  -TW       Post SpO2 (%)  98  -TW      Pre Patient Position  Supine  -TW      Intra Patient Position  Supine  -TW      Post Patient Position  Supine  -TW      Recorded by [TW] Abel Wheatley PTA 01/03/20 1115      Row Name 01/03/20 0900             Cognitive Assessment/Intervention- PT/OT    Affect/Mental Status (Cognitive)  WFL  -TW      Orientation Status (Cognition)  oriented x 4  -TW      Follows Commands (Cognition)  WFL  -TW      Personal Safety Interventions  muscle strengthening facilitated  -TW      Recorded by [TW] Abel Wheatley PTA 01/03/20 1115      Row Name 01/03/20 0900             Safety Issues, Functional Mobility    Impairments Affecting Function (Mobility)  balance;endurance/activity tolerance;pain;strength  -TW      Recorded by [TW] Abel Wheatley PTA 01/03/20 1115      Row Name 01/03/20 0900             Mobility Assessment/Intervention    Right Lower Extremity (Weight-bearing Status)  (S) weight-bearing as tolerated (WBAT)  -TW      Recorded by [TW] Abel Wheatley PTA 01/03/20 1115      Row Name 01/03/20 0900             Bed Mobility Assessment/Treatment    Rolling Left [place] (Bed Mobility)  conditional independence  -TW      Rolling Right [place] (Bed Mobility)  conditional independence  -TW      Scooting/Bridging [place] (Bed Mobility)  conditional independence  -TW      Supine-Sit [place] (Bed Mobility)  not tested  -TW      Sit-Supine [place] (Bed Mobility)  not tested  -TW      Bed Mobility, Safety Issues  decreased use of legs for bridging/pushing  -TW      Assistive Device (Bed Mobility)  bed rails  -TW      Comment (Bed Mobility)  Pt able to demonstrate Cond ind bed mobility this tx.  -TW      Recorded by [TW] Abel Wheatley PTA 01/03/20 1115      Row Name 01/03/20 0900             Sit-Stand Transfer    Sit-Stand [place] (Transfers)  not tested  -TW      Recorded by [TW] Abel Wheatley PTA 01/03/20 1115      Row Name 01/03/20 0900              Stand-Sit Transfer    Stand-Sit [place] (Transfers)  not tested  -TW      Recorded by [TW] Abel Wheatley PTA 01/03/20 1115      Row Name 01/03/20 0900             Gait/Stairs Assessment/Training    Comment (Gait/Stairs)  Pt not able to perform due to new immobilizer not able to be applied due to girth limit of upper thigh strap not long enough on new brace. Pt needs 21-22 inch immobilizer to support thigh girth.   -TW      Recorded by [TW] Abel Wheatley PTA 01/03/20 1115      Row Name 01/03/20 0900             Lower Extremity Supine Therapeutic Exercise    Performed, Supine Lower Extremity (Therapeutic Exercise)  gluteal sets;quadriceps sets;ankle pumps;heel slides;hip abduction/adduction;hip external/internal rotation  -TW      Exercise Type, Supine Lower Extremity (Therapeutic Exercise)  AROM (active range of motion)  -TW      Sets/Reps Detail, Supine Lower Extremity (Therapeutic Exercise)  1/15  -TW      Recorded by [TW] Abel Wheatley PTA 01/03/20 111[place]      Row Name 01/03/20 0900             Positioning and Restraints    Pre-Treatment Position  in bed  -TW      Post Treatment Position  bed  -TW      In Bed  supine;call light within reach;exit alarm on;encouraged to call for assist  -TW      Recorded by [TW] Abel Wheatley PTA 01/03/20 111[place]      Row Name 01/03/20 0900             Pain Scale: Numbers Pre/Post-Treatment    Pain Scale: Numbers, Pretreatment  1/10  -TW      Pain Scale: Numbers, Post-Treatment  1/10  -TW      Pain Location - Side  Right  -TW      Pain Location  knee  -TW      Recorded by [TW] Abel Wheatley PTA 01/03/20 1115      Row Name                Wound 12/26/19 1246 Right anterior knee Incision    Wound - Properties Group Date first assessed: 12/26/19 [AQ] Time first assessed: 1246 [AQ] Side: Right [AQ] Orientation: anterior [AQ] Location: knee [AQ] Primary Wound Type: Incision [AQ] Recorded by:  [AQ] Nguyen Dugan RN 12/26/19 1246    Kate  Name 01/03/20 0900             Outcome Summary/Treatment Plan (PT)    Daily Summary of Progress (PT)  progress toward functional goals is good  -TW      Barriers to Overall Progress (PT)  R knee immobilizer not able to fit pt's upper thigh.  -TW      Plan for Continued Treatment (PT)  Cont to attempt tx as pt able.  -TW      Anticipated Discharge Disposition (PT)  anticipate therapy at next level of care  -TW      Recorded by [TW] Abel Wheatley PTA 01/03/20 1115        User Key  (r) = Recorded By, (t) = Taken By, (c) = Cosigned By    Initials Name Effective Dates Discipline    AQ Nguyen Dugan RN 10/17/16 -  Nurse    TW Abel Wheatley PTA 03/07/18 -  PT          Wound 12/26/19 1246 Right anterior knee Incision (Active)   Dressing Appearance moist drainage 1/3/2020  7:30 AM   Closure [place] 1/3/2020  7:30 AM   Base pink 1/3/2020  7:30 AM   Drainage Characteristics/Odor serosanguineous 1/3/2020  7:30 AM   Drainage Amount moderate 1/3/2020  7:30 AM   Care, Wound cleansed with;antimicrobial agent applied;dressing removed 1/3/2020  7:30 AM   Dressing Care, Wound dressing applied 1/3/2020  7:30 AM   Periwound Care, Wound cleansed with pH balanced cleanser 1/3/2020  7:30 AM       Rehab Goal Summary     Row Name 01/03/20 0900             Bed Mobility Goal 1 (PT)    Activity/Assistive Device (Bed Mobility Goal 1, PT)  bed mobility activities, all  -TW      [place] Level/Cues Needed (Bed Mobility Goal 1, PT)  independent;conditional independence  -TW      Time Frame (Bed Mobility Goal 1, PT)  short term goal (STG);3 days  -TW      Progress/Outcomes (Bed Mobility Goal 1, PT)  (S) goal met  -TW         Transfer Goal 1 (PT)    Activity/Assistive Device (Transfer Goal 1, PT)  bed-to-chair/chair-to-bed;toilet  -TW      [place] Level/Cues Needed (Transfer Goal 1, PT)  conditional independence  -TW      Time Frame (Transfer Goal 1, PT)  long term goal (LTG);by discharge;10 days  -TW      Progress/Outcome  (Transfer Goal 1, PT)  goal not met  -TW         Gait Training Goal 1 (PT)    Activity/Assistive Device (Gait Training Goal 1, PT)  gait (walking locomotion);assistive device use;backward stepping;decrease fall risk;sidestepping;turning, left;turning, right;forward stepping;improve balance and speed;walker, rolling  -TW      [place] Level (Gait Training Goal 1, PT)  conditional independence  -TW      Distance (Gait Goal 1, PT)  150 ft or more  -TW      Time Frame (Gait Training Goal 1, PT)  by discharge;3 weeks;long term goal (LTG)  -TW      Progress/Outcome (Gait Training Goal 1, PT)  goal not met  -TW         ROM Goal 1 (PT)    ROM Goal 1 (PT)  R knee 0-95 deg AROM  -TW      Time Frame (ROM Goal 1, PT)  long term goal (LTG);5 - 7 days  -TW      Progress/Outcome (ROM Goal 1, PT)  goal not met  -TW         Stairs Goal 1 (PT)    Activity/Assistive Device (Stairs Goal 1, PT)  ascending stairs;descending stairs;assistive device use  -TW      [place] Level/Cues Needed (Stairs Goal 1, PT)  conditional independence  -TW      Time Frame (Stairs Goal 1, PT)  by discharge;3 weeks  -TW      Progress/Outcome (Stairs Goal 1, PT)  goal not met  -TW        User Key  (r) = Recorded By, (t) = Taken By, (c) = Cosigned By    Initials Name Provider Type Discipline    TW Abel Wheatley, PTA Physical Therapy Assistant PT              PT Recommendation and Plan  Anticipated Discharge Disposition (PT): anticipate therapy at next level of care  Outcome Summary/Treatment Plan (PT)  Daily Summary of Progress (PT): progress toward functional goals is good  Barriers to Overall Progress (PT): R knee immobilizer not able to fit pt's upper thigh.  Plan for Continued Treatment (PT): Cont to attempt tx as pt able.  Anticipated Discharge Disposition (PT): anticipate therapy at next level of care  Plan of Care Reviewed With: patient  Progress: no change  Outcome Summary: Pt demonstrates good ability to move in bed using handrails. Pt  recieved new R knee immobilizer this date but when several attempts were made to adjust brace to fit pt, it was deemed pt needs larger brace to accomodate pt's girth of upper thigh area. Pt was able to perform LE therex to exclude R knee flex due to MD placing pressure wrap around pt's knee. Pt would cont to benefit from therapy upon DC.  Outcome Measures     Row Name 01/03/20 0900 01/02/20 1400 01/02/20 0950       How much help from another person do you currently need...    Turning from your back to your side while in flat bed without using bedrails?  4  -TW  --  4  -TW    Moving from lying on back to sitting on the side of a flat bed without bedrails?  4  -TW  --  4  -TW    Moving to and from a bed to a chair (including a wheelchair)?  3  -TW  --  3  -TW    Standing up from a chair using your arms (e.g., wheelchair, bedside chair)?  3  -TW  --  3  -TW    Climbing 3-5 steps with a railing?  2  -TW  --  2  -TW    To walk in hospital room?  3  -TW  --  3  -TW    AM-PAC 6 Clicks Score (PT)  19  -TW  --  19  -TW       How much help from another is currently needed...    Putting on and taking off regular lower body clothing?  --  1  -CS  --    Bathing (including washing, rinsing, and drying)  --  2  -CS  --    Toileting (which includes using toilet bed pan or urinal)  --  2  -CS  --    Putting on and taking off regular upper body clothing  --  3  -CS  --    Taking care of personal grooming (such as brushing teeth)  --  3  -CS  --    Eating meals  --  3  -CS  --    AM-PAC 6 Clicks Score (OT)  --  14  -CS  --       Functional Assessment    Outcome Measure Options  AM-PAC 6 Clicks Basic Mobility (PT)  -TW  --  AM-PAC 6 Clicks Basic Mobility (PT)  -TW    Row Name 01/01/20 1002 01/01/20 0844 01/01/20 0843       How much help from another person do you currently need...    Turning from your back to your side while in flat bed without using bedrails?  4  -TW  --  --    Moving from lying on back to sitting on the side of a  flat bed without bedrails?  4  -TW  --  --    Moving to and from a bed to a chair (including a wheelchair)?  3  -TW  --  --    Standing up from a chair using your arms (e.g., wheelchair, bedside chair)?  3  -TW  --  --    Climbing 3-5 steps with a railing?  1  -TW  --  --    To walk in hospital room?  3  -TW  --  --    AM-PAC 6 Clicks Score (PT)  18  -TW  --  --       How much help from another is currently needed...    Putting on and taking off regular lower body clothing?  --  1  -LITA  2  -TO    Bathing (including washing, rinsing, and drying)  --  2  -LITA  2  -TO    Toileting (which includes using toilet bed pan or urinal)  --  2  -LITA  2  -TO    Putting on and taking off regular upper body clothing  --  3  -LITA  3  -TO    Taking care of personal grooming (such as brushing teeth)  --  3  -LITA  3  -TO    Eating meals  --  3  -LITA  4  -TO    AM-PAC 6 Clicks Score (OT)  --  14  -LITA  16  -TO       Functional Assessment    Outcome Measure Options  AM-PAC 6 Clicks Basic Mobility (PT)  -TW  --  --    Row Name 12/31/19 1401             How much help from another person do you currently need...    Turning from your back to your side while in flat bed without using bedrails?  4  -TW      Moving from lying on back to sitting on the side of a flat bed without bedrails?  4  -TW      Moving to and from a bed to a chair (including a wheelchair)?  2  -TW      Standing up from a chair using your arms (e.g., wheelchair, bedside chair)?  3  -TW      Climbing 3-5 steps with a railing?  1  -TW      To walk in hospital room?  2  -TW      AM-PAC 6 Clicks Score (PT)  16  -TW         Functional Assessment    Outcome Measure Options  AM-PAC 6 Clicks Basic Mobility (PT)  -TW        User Key  (r) = Recorded By, (t) = Taken By, (c) = Cosigned By    Initials Name Provider Type    TW Abel Wheatley, PTA Physical Therapy Assistant    TO Talon Cavanaugh, HAILE/L Occupational Therapy Assistant     Sondra Vega, HAILE/L Occupational Therapy  Assistant    Mikayla Orr OTA Occupational Therapy Assistant         Time Calculation:   PT Charges     Row Name 01/03/20 1119             Time Calculation    Start Time  0900  -TW      Stop Time  0956  -TW      Time Calculation (min)  56 min  -TW      PT Non-Billable Time (min)  30 min  -TW      PT Received On  01/03/20  -TW      PT Goal Re-Cert Due Date  01/08/20  -TW         Time Calculation- PT    Total Timed Code Minutes- PT  26 minute(s)  -TW        User Key  (r) = Recorded By, (t) = Taken By, (c) = Cosigned By    Initials Name Provider Type    TW Abel Wheatley PTA Physical Therapy Assistant        Therapy Charges for Today     Code Description Service Date Service Provider Modifiers Qty    98261636870 HC GAIT TRAINING EA 15 MIN 1/2/2020 Abel Wheatley, WADE GP 1    96352760990 HC PT THERAPEUTIC ACT EA 15 MIN 1/2/2020 Abel Wheatley, WADE GP 2    95411828994 HC PT THER SUPP EA 15 MIN 1/2/2020 Abel Wheatley, WADE GP 2    41698818938 HC PT THERAPEUTIC ACT EA 15 MIN 1/3/2020 Abel Wheatley, PTA GP 1    34543424766 HC PT THER PROC EA 15 MIN 1/3/2020 Abel Wheatley, PTA GP 1    31179752621 HC PT THER SUPP EA 15 MIN 1/3/2020 Abel Wheatley, WADE GP 2          PT G-Codes  Outcome Measure Options: AM-PAC 6 Clicks Basic Mobility (PT)  AM-PAC 6 Clicks Score (PT): 19  AM-PAC 6 Clicks Score (OT): 14    Abel Wheatley PTA  1/3/2020

## 2020-01-03 NOTE — PLAN OF CARE
Pt continues to be inappropriate with staff and make frequent calls seeking out staff.      Problem: Patient Care Overview  Goal: Plan of Care Review  Flowsheets (Taken 1/3/2020 0278)  Progress: improving  Plan of Care Reviewed With: patient     Problem: Knee Arthroplasty (Total, Partial) (Adult)  Goal: Signs and Symptoms of Listed Potential Problems Will be Absent, Minimized or Managed (Knee Arthroplasty)  Flowsheets (Taken 1/3/2020 9889)  Problems Assessed (Knee Arthroplasty): all  Problems Present (Knee Arthroplasty): situational response

## 2020-01-03 NOTE — PROGRESS NOTES
ORTHOPEDIC PROGRESS NOTE:    Name:  Michael Sorto  Date:    1/3/2020  Date of admission:  12/26/2019    Post op day:  8 Days Post-Op  Procedure:    Procedure(s) (LRB):  RIGHT TOTAL KNEE ARTHROPLASTY REVISION (Right)    Subjective: Feels well better today.  Doppler ultrasound is negative.    Vitals:    Vitals:    01/03/20 0402   BP: 122/78   Pulse: 71   Resp: 18   Temp: 97.5 °F (36.4 °C)   SpO2: 94%       Exam: Blood pressure stable.  Still has drainage catheter wound.  2+ effusion.    Lab Results (last 24 hours)     Procedure Component Value Units Date/Time    CBC (No Diff) [[id number]]  (Abnormal) Collected:  01/03/20 0550    Specimen:  Blood Updated:  01/03/20 0655     WBC 4.42 10*3/mm3      RBC 2.81 10*6/mm3      Hemoglobin 8.4 g/dL      Hematocrit 26.1 %      MCV 92.9 fL      MCH 29.9 pg      MCHC 32.2 g/dL      RDW 14.2 %      RDW-SD 48.0 fl      MPV 9.7 fL      Platelets 138 10*3/mm3           ASSESSMENT:  Active Problems:    Painful total knee replacement, initial encounter (CMS/[facility])    Pyogenic arthritis of right knee joint (CMS/[facility])    Painful total knee replacement (CMS/[facility])        PLAN: Hemoglobin is excellent,.  I have aspirated again today about 120 cc of old blood.  I put a compressive wrap and Ace wrap on.  We will check tomorrow to see drainage is stopped.  Talk to today but discharge plans tomorrow if drainage is stopped and will follow him up 1 week and today.  We will keep immobilizer on for probably 5 or 6 days until I see him back next weeks after drainage.  Wife Dr. Carrasco to see him tomorrow prior to discharge      Rufino Lu MD  01/03/20  7:59 AM

## 2020-01-03 NOTE — PLAN OF CARE
Problem: Patient Care Overview  Goal: Plan of Care Review  Outcome: Ongoing (interventions implemented as appropriate)  Flowsheets  Taken 1/3/2020 0306  Progress: no change  Outcome Summary: VSS; denies need for pain meds; dressing changed; resting well; will continue to monitor  Taken 1/2/2020 2055  Plan of Care Reviewed With: patient
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
## 2020-01-03 NOTE — PLAN OF CARE
Problem: Patient Care Overview  Goal: Plan of Care Review  Outcome: Ongoing (interventions implemented as appropriate)  Flowsheets  Taken 1/3/2020 1150 by Sondra Vega, LAZARA/L  Progress: improving  Outcome Summary: Pt tolerated tx well this date. Pt was cod I with bed mobility. Unable to do any transfers this tx due to knee immobilizer not fitting properly. Pt gave good effort with UE ther ex. Continue OT POC.  Taken 1/3/2020 0900 by Abel Wheatley, WADE  Plan of Care Reviewed With: patient
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
## 2020-01-03 NOTE — THERAPY TREATMENT NOTE
Acute Care - Occupational Therapy Treatment Note  [facility]     Patient Name: Mihcael Sorto  : 1955  MRN: [id number]  Today's Date: 1/3/2020  Onset of Illness/Injury or Date of Surgery: 19  Date of Referral to OT: 19  Referring Physician: Dr. Lu    Admit Date: 2019       ICD-10-CM ICD-9-CM   1. Pyogenic arthritis of right knee joint, due to unspecified organism (CMS/HCC) M00.9 711.06   2. Painful total knee replacement, initial encounter (CMS/HCC) T84.84XA 996.77    Z96.659 V43.65     338.18   3. Impaired functional mobility, balance, gait, and endurance Z74.09 V49.89   4. Impaired mobility and ADLs Z74.09 799.89     Patient Active Problem List   Diagnosis   • Headache   • Nausea   • Nontraumatic intracerebral hemorrhage (CMS/HCC)   • Hypertension   • Morbid obesity (CMS/HCC)   • Cellulitis of left lower extremity   • Intracerebral hemorrhage (CMS/HCC)   • Chronic pain of right knee   • Painful total knee replacement, initial encounter (CMS/HCC)   • Pyogenic arthritis of right knee joint (CMS/HCC)   • Painful total knee replacement (CMS/HCC)     Past Medical History:   Diagnosis Date   • Aortic valvar stenosis    • Cellulitis of right lower extremity    • Chronic pain syndrome    • CVA (cerebral vascular accident) (CMS/HCC)    • Depressive disorder    • GERD (gastroesophageal reflux disease)    • Hypertension    • Injury of lower leg    • Kidney stone    • Morbid obesity (CMS/HCC)    • Painful total knee replacement, initial encounter (CMS/HCC) 3/6/2019   • Pyogenic arthritis of right knee joint (CMS/HCC) 3/6/2019   • Right knee pain      Past Surgical History:   Procedure Laterality Date   • AMPUTATION Left     BKA   • AORTIC VALVE REPAIR/REPLACEMENT     • APPENDECTOMY     • CARDIAC CATHETERIZATION     • REPLACEMENT TOTAL KNEE Right    • TONSILLECTOMY     • TOTAL KNEE  PROSTHESIS REMOVAL W/ SPACER INSERTION Right 2019    Procedure: REMOVAL OF TOTAL KNEE  COMPONENTS RIGHT KNEE WITH INSERTION OF CEMENT ANTIBIOTIC SPACER;  Surgeon: Rufino Lu MD;  Location: [facility];  Service: Orthopedics   • TOTAL KNEE ARTHROPLASTY REVISION Right 12/26/2019    Procedure: RIGHT TOTAL KNEE ARTHROPLASTY REVISION;  Surgeon: Rufino Lu MD;  Location: [facility];  Service: Orthopedics       Therapy Treatment    Rehabilitation Treatment Summary     Row Name 01/03/20 0901 01/03/20 0900          Treatment Time/Intention    Discipline  occupational therapy assistant  -CS  physical therapy assistant  -TW     Document Type  therapy note (daily note)  -CS  therapy note (daily note)  -TW     Subjective Information  no complaints  -CS  no complaints  -TW     Mode of Treatment  occupational therapy;co-treatment  -CS  physical therapy;co-treatment;occupational therapy  -TW     Patient/Family Observations  --  Pt agreeable to tx at this time.  -TW     Therapy Frequency (OT Eval)  daily  -CS  --     Patient Effort  adequate  -CS  adequate  -TW     Existing Precautions/Restrictions  fall  -CS  fall  -TW     Recorded by [CS] Sondra Vega COTA/LIAM 01/03/20 1149 [TW] Abel Wheatley PTA 01/03/20 1115     Row Name 01/03/20 0901 01/03/20 0900          Vital Signs    Pre Systolic BP Rehab  140  -CS  140  -TW     Pre Treatment Diastolic BP  78  -CS  78  -TW     Pretreatment Heart Rate (beats/min)  72  -CS  72  -TW     Posttreatment Heart Rate (beats/min)  80  -CS  80  -TW     Pre SpO2 (%)  99  -CS  99  -TW     O2 Delivery Pre Treatment  room air  -CS  room air  -TW     Post SpO2 (%)  98  -CS  98  -TW     O2 Delivery Post Treatment  room air  -CS  --     Pre Patient Position  Supine  -CS  Supine  -TW     Intra Patient Position  Supine  -CS  Supine  -TW     Post Patient Position  Supine  -CS  Supine  -TW     Recorded by [CS] Sondra Vega COTA/LIAM 01/03/20 1149 [TW] Abel Wheatley PTA 01/03/20 1115     Row Name 01/03/20 0901 01/03/20 0900          Cognitive  Assessment/Intervention- PT/OT    Affect/Mental Status (Cognitive)  WFL  -CS  WFL  -TW     Orientation Status (Cognition)  oriented x 4  -CS  oriented x 4  -TW     Follows Commands (Cognition)  WFL  -CS  WFL  -TW     Personal Safety Interventions  --  muscle strengthening facilitated  -TW     Recorded by [CS] Sondra Vega COTA/LIAM 01/03/20 1149 [TW] Abel Wheatley, WADE 01/03/20 1115     Row Name 01/03/20 0900             Safety Issues, Functional Mobility    Impairments Affecting Function (Mobility)  balance;endurance/activity tolerance;pain;strength  -TW      Recorded by [TW] Abel Wheatley PTA 01/03/20 1115      Row Name 01/03/20 0901 01/03/20 0900          Mobility Assessment/Intervention    Right Lower Extremity (Weight-bearing Status)  weight-bearing as tolerated (WBAT)  -CS  (S) weight-bearing as tolerated (WBAT)  -TW     Recorded by [CS] Sondra Vega COTA/LIAM 01/03/20 1149 [TW] Abel Wheatley PTA 01/03/20 1115     Row Name 01/03/20 0901 01/03/20 0900          Bed Mobility Assessment/Treatment    Rolling Left [place] (Bed Mobility)  conditional independence  -CS  conditional independence  -TW     Rolling Right [place] (Bed Mobility)  conditional independence  -CS  conditional independence  -TW     Scooting/Bridging [place] (Bed Mobility)  conditional independence  -CS  conditional independence  -TW     Supine-Sit [place] (Bed Mobility)  not tested  -CS  not tested  -TW     Sit-Supine [place] (Bed Mobility)  not tested  -CS  not tested  -TW     Bed Mobility, Safety Issues  --  decreased use of legs for bridging/pushing  -TW     Assistive Device (Bed Mobility)  bed rails  -CS  bed rails  -TW     Comment (Bed Mobility)  --  Pt able to demonstrate Cond ind bed mobility this tx.  -TW     Recorded by [CS] Sondra Vega COTA/LIAM 01/03/20 1149 [TW] Abel Wheatley PTA 01/03/20 1115     Row Name 01/03/20 0900             Sit-Stand Transfer    Sit-Stand  [place] (Transfers)  not tested  -TW      Recorded by [TW] Able Wheatley, WADE 01/03/20 1115      Row Name 01/03/20 0900             Stand-Sit Transfer    Stand-Sit [place] (Transfers)  not tested  -TW      Recorded by [TW] Abel Wheatley, PTA 01/03/20 1115      Row Name 01/03/20 0901 01/03/20 0900          Gait/Stairs Assessment/Training    Comment (Gait/Stairs)  Pt not able to perform due to new immobilizer not able to be applied due to girth limit of upper thigh strap not long enough on new brace. Pt needs 21-22 inch immobilizer to support thigh girth.   -CS  Pt not able to perform due to new immobilizer not able to be applied due to girth limit of upper thigh strap not long enough on new brace. Pt needs 21-22 inch immobilizer to support thigh girth.   -TW     Recorded by [CS] Sondra Vega COTA/LIAM 01/03/20 1149 [TW] Abel Wheatley PTA 01/03/20 1115     Row Name 01/03/20 0900             Lower Extremity Supine Therapeutic Exercise    Performed, Supine Lower Extremity (Therapeutic Exercise)  gluteal sets;quadriceps sets;ankle pumps;heel slides;hip abduction/adduction;hip external/internal rotation  -TW      Exercise Type, Supine Lower Extremity (Therapeutic Exercise)  AROM (active range of motion)  -TW      Sets/Reps Detail, Supine Lower Extremity (Therapeutic Exercise)  1/15  -TW      Recorded by [TW] Abel Wheatley PTA 01/03/20 1115      Row Name 01/03/20 0901             Therapeutic Exercise    Upper Extremity (Therapeutic Exercise)  bicep curl, bilateral  -CS      Upper Extremity Range of Motion (Therapeutic Exercise)  shoulder flexion/extension, bilateral;shoulder internal/external rotation, bilateral;elbow flexion/extension, bilateral;forearm supination/pronation, bilateral;wrist flexion/extension, bilateral  -CS      Hand (Therapeutic Exercise)  finger flexion/extension, bilateral  -CS      Weight/Resistance (Therapeutic Exercise)  3 pounds  -CS      Exercise Type  (Therapeutic Exercise)  AROM (active range of motion)  -CS      Position (Therapeutic Exercise)  other (see comments) long sitting  -CS      Sets/Reps (Therapeutic Exercise)  1/25  -CS      Equipment (Therapeutic Exercise)  free weight, barbell  -CS      Expected Outcome (Therapeutic Exercise)  improve functional tolerance, self-care activity;improve performance, transfer skills  -CS      Recorded by [CS] Sondra Vega COTA/L 01/03/20 1149      Row Name 01/03/20 0901 01/03/20 0900          Positioning and Restraints    Pre-Treatment Position  in bed  -CS  in bed  -TW     Post Treatment Position  bed  -CS  bed  -TW     In Bed  supine;call light within reach;encouraged to call for assist;exit alarm on;with nsg  -CS  supine;call light within reach;exit alarm on;encouraged to call for assist  -TW     Recorded by [CS] Sondra Vega COTA/L 01/03/20 1149 [TW] Abel Wheatley, PTA 01/03/20 1115     Row Name 01/03/20 0901 01/03/20 0900          Pain Scale: Numbers Pre/Post-Treatment    Pain Scale: Numbers, Pretreatment  1/10  -CS  1/10  -TW     Pain Scale: Numbers, Post-Treatment  1/10  -CS  1/10  -TW     Pain Location - Side  Right  -CS  Right  -TW     Pain Location  knee  -CS  knee  -TW     Recorded by [CS] Sondra Vega COTA/L 01/03/20 1149 [TW] Abel Wheatley, PTA 01/03/20 1115     Row Name                Wound 12/26/19 1246 Right anterior knee Incision    Wound - Properties Group Date first assessed: 12/26/19 [AQ] Time first assessed: 1246 [AQ] Side: Right [AQ] Orientation: anterior [AQ] Location: knee [AQ] Primary Wound Type: Incision [AQ] Recorded by:  [AQ] Nguyen Dugan RN 12/26/19 1246    Row Name 01/03/20 0901             Outcome Summary/Treatment Plan (OT)    Daily Summary of Progress (OT)  progress toward functional goals is good  -CS      Plan for Continued Treatment (OT)  cont OT POC  -CS      Anticipated Discharge Disposition (OT)  anticipate therapy at next level of care  -CS       Recorded by [CS] Sondra Vega COTA/L 01/03/20 1149      Row Name 01/03/20 0900             Outcome Summary/Treatment Plan (PT)    Daily Summary of Progress (PT)  progress toward functional goals is good  -TW      Barriers to Overall Progress (PT)  R knee immobilizer not able to fit pt's upper thigh.  -TW      Plan for Continued Treatment (PT)  Cont to attempt tx as pt able.  -TW      Anticipated Discharge Disposition (PT)  anticipate therapy at next level of care  -TW      Recorded by [TW] Abel Wheatley PTA 01/03/20 1115        User Key  (r) = Recorded By, (t) = Taken By, (c) = Cosigned By    Initials Name Effective Dates Discipline    AQ Nguyen Dugan RN 10/17/16 -  Nurse    TW Abel Wheatley PTA 03/07/18 -  PT    CS Sondra Vega COTA/LIAM 03/07/18 -  OT        Wound 12/26/19 1246 Right anterior knee Incision (Active)   Dressing Appearance moist drainage 1/3/2020  7:30 AM   Closure [place] 1/3/2020  7:30 AM   Base pink 1/3/2020  7:30 AM   Drainage Characteristics/Odor serosanguineous 1/3/2020  7:30 AM   Drainage Amount moderate 1/3/2020  7:30 AM   Care, Wound cleansed with;antimicrobial agent applied;dressing removed 1/3/2020  7:30 AM   Dressing Care, Wound dressing applied 1/3/2020  7:30 AM   Periwound Care, Wound cleansed with pH balanced cleanser 1/3/2020  7:30 AM     Rehab Goal Summary     Row Name 01/03/20 0901 01/03/20 0900          Bed Mobility Goal 1 (PT)    Activity/Assistive Device (Bed Mobility Goal 1, PT)  --  bed mobility activities, all  -TW     [place] Level/Cues Needed (Bed Mobility Goal 1, PT)  --  independent;conditional independence  -TW     Time Frame (Bed Mobility Goal 1, PT)  --  short term goal (STG);3 days  -TW     Progress/Outcomes (Bed Mobility Goal 1, PT)  --  (S) goal met  -TW        Transfer Goal 1 (PT)    Activity/Assistive Device (Transfer Goal 1, PT)  --  bed-to-chair/chair-to-bed;toilet  -TW     [place] Level/Cues Needed (Transfer Goal 1, PT)  --   conditional independence  -TW     Time Frame (Transfer Goal 1, PT)  --  long term goal (LTG);by discharge;10 days  -TW     Progress/Outcome (Transfer Goal 1, PT)  --  goal not met  -TW        Gait Training Goal 1 (PT)    Activity/Assistive Device (Gait Training Goal 1, PT)  --  gait (walking locomotion);assistive device use;backward stepping;decrease fall risk;sidestepping;turning, left;turning, right;forward stepping;improve balance and speed;walker, rolling  -TW     [place] Level (Gait Training Goal 1, PT)  --  conditional independence  -TW     Distance (Gait Goal 1, PT)  --  150 ft or more  -TW     Time Frame (Gait Training Goal 1, PT)  --  by discharge;3 weeks;long term goal (LTG)  -TW     Progress/Outcome (Gait Training Goal 1, PT)  --  goal not met  -TW        ROM Goal 1 (PT)    ROM Goal 1 (PT)  --  R knee 0-95 deg AROM  -TW     Time Frame (ROM Goal 1, PT)  --  long term goal (LTG);5 - 7 days  -TW     Progress/Outcome (ROM Goal 1, PT)  --  goal not met  -TW        Stairs Goal 1 (PT)    Activity/Assistive Device (Stairs Goal 1, PT)  --  ascending stairs;descending stairs;assistive device use  -TW     [place] Level/Cues Needed (Stairs Goal 1, PT)  --  conditional independence  -TW     Time Frame (Stairs Goal 1, PT)  --  by discharge;3 weeks  -TW     Progress/Outcome (Stairs Goal 1, PT)  --  goal not met  -TW        Occupational Therapy Goals    Transfer Goal Selection (OT)  transfer, OT goal 1  -CS  --     Dressing Goal Selection (OT)  dressing, OT goal 1  -CS  --     Balance Goal Selection (OT)  balance, OT goal 1  -CS  --     Endurance Goal Selection (OT)  endurance, OT goal 1  -CS  --        Transfer Goal 1 (OT)    Activity/Assistive Device (Transfer Goal 1, OT)  toilet;commode, bedside with drop arms;commode, bedside without drop arms  -CS  --     [place] Level/Cues Needed (Transfer Goal 1, OT)  moderate assist (50-74% patient effort)  -CS  --     Time Frame (Transfer Goal 1, OT)  long term  goal (LTG);by discharge  -CS  --     Progress/Outcome (Transfer Goal 1, OT)  goal not met  -CS  --        Dressing Goal 1 (OT)    Activity/Assistive Device (Dressing Goal 1, OT)  dressing skills, all  -CS  --     [place]/Cues Needed (Dressing Goal 1, OT)  minimum assist (75% or more patient effort)  -CS  --     Time Frame (Dressing Goal 1, OT)  long term goal (LTG);by discharge  -CS  --     Progress/Outcome (Dressing Goal 1, OT)  goal not met;good progress toward goal  -CS  --        Balance Goal 1 (OT)    Activity/Assistive Device (Balance Goal 1, OT)  sitting, dynamic 30 min no LOB and good safety  -CS  --     [place] Level/Cues Needed (Balance Goal 1, OT)  supervision required  -CS  --     Time Frame (Balance Goal 1, OT)  long term goal (LTG);by discharge  -CS  --     Progress/Outcomes (Balance Goal 1, OT)  goal met  -CS  --         Endurance Goal 1 (OT)    Activity Level (Endurance Goal 1, OT)  endurance 2 fair + 25 min functional task 3 or less rest breaks.   -CS  --     Time Frame (Endurance Goal 1, OT)  long term goal (LTG);by discharge  -CS  --     Progress/Outcome (Endurance Goal 1, OT)  goal met  -CS  --       User Key  (r) = Recorded By, (t) = Taken By, (c) = Cosigned By    Initials Name Provider Type Discipline    TW Abel Wheatley, PTA Physical Therapy Assistant PT    CS Sondra Vega COTA/LIAM Occupational Therapy Assistant OT            OT Recommendation and Plan  Outcome Summary/Treatment Plan (OT)  Daily Summary of Progress (OT): progress toward functional goals is good  Plan for Continued Treatment (OT): cont OT POC  Anticipated Discharge Disposition (OT): anticipate therapy at next level of care  Therapy Frequency (OT Eval): daily  Daily Summary of Progress (OT): progress toward functional goals is good  Outcome Summary: Pt tolerated tx well this date. Pt was cod I with bed mobility. Unable to do any transfers this tx due to knee immobilizer not fitting properly. Pt gave good  effort with UE ther ex. Continue OT POC.  Outcome Measures     Row Name 01/03/20 1100 01/03/20 0900 01/02/20 1400       How much help from another person do you currently need...    Turning from your back to your side while in flat bed without using bedrails?  --  4  -TW  --    Moving from lying on back to sitting on the side of a flat bed without bedrails?  --  4  -TW  --    Moving to and from a bed to a chair (including a wheelchair)?  --  3  -TW  --    Standing up from a chair using your arms (e.g., wheelchair, bedside chair)?  --  3  -TW  --    Climbing 3-5 steps with a railing?  --  2  -TW  --    To walk in hospital room?  --  3  -TW  --    AM-PAC 6 Clicks Score (PT)  --  19  -TW  --       How much help from another is currently needed...    Putting on and taking off regular lower body clothing?  1  -CS  --  1  -CS    Bathing (including washing, rinsing, and drying)  2  -CS  --  2  -CS    Toileting (which includes using toilet bed pan or urinal)  2  -CS  --  2  -CS    Putting on and taking off regular upper body clothing  3  -CS  --  3  -CS    Taking care of personal grooming (such as brushing teeth)  3  -CS  --  3  -CS    Eating meals  3  -CS  --  3  -CS    AM-PAC 6 Clicks Score (OT)  14  -CS  --  14  -CS       Functional Assessment    Outcome Measure Options  --  AM-PAC 6 Clicks Basic Mobility (PT)  -TW  --    Row Name 01/02/20 0950 01/01/20 1002 01/01/20 0844       How much help from another person do you currently need...    Turning from your back to your side while in flat bed without using bedrails?  4  -TW  4  -TW  --    Moving from lying on back to sitting on the side of a flat bed without bedrails?  4  -TW  4  -TW  --    Moving to and from a bed to a chair (including a wheelchair)?  3  -TW  3  -TW  --    Standing up from a chair using your arms (e.g., wheelchair, bedside chair)?  3  -TW  3  -TW  --    Climbing 3-5 steps with a railing?  2  -TW  1  -TW  --    To walk in hospital room?  3  -TW  3  -TW   --    AM-PAC 6 Clicks Score (PT)  19  -TW  18  -TW  --       How much help from another is currently needed...    Putting on and taking off regular lower body clothing?  --  --  1  -LITA    Bathing (including washing, rinsing, and drying)  --  --  2  -LITA    Toileting (which includes using toilet bed pan or urinal)  --  --  2  -LITA    Putting on and taking off regular upper body clothing  --  --  3  -LITA    Taking care of personal grooming (such as brushing teeth)  --  --  3  -LITA    Eating meals  --  --  3  -LITA    AM-PAC 6 Clicks Score (OT)  --  --  14  -LITA       Functional Assessment    Outcome Measure Options  AM-PAC 6 Clicks Basic Mobility (PT)  -TW  -[facility] 6 Clicks Basic Mobility (PT)  -TW  --    Row Name 01/01/20 0843 12/31/19 1401          How much help from another person do you currently need...    Turning from your back to your side while in flat bed without using bedrails?  --  4  -TW     Moving from lying on back to sitting on the side of a flat bed without bedrails?  --  4  -TW     Moving to and from a bed to a chair (including a wheelchair)?  --  2  -TW     Standing up from a chair using your arms (e.g., wheelchair, bedside chair)?  --  3  -TW     Climbing 3-5 steps with a railing?  --  1  -TW     To walk in hospital room?  --  2  -TW     AM-PAC 6 Clicks Score (PT)  --  16  -TW        How much help from another is currently needed...    Putting on and taking off regular lower body clothing?  2  -TO  --     Bathing (including washing, rinsing, and drying)  2  -TO  --     Toileting (which includes using toilet bed pan or urinal)  2  -TO  --     Putting on and taking off regular upper body clothing  3  -TO  --     Taking care of personal grooming (such as brushing teeth)  3  -TO  --     Eating meals  4  -TO  --     AM-PAC 6 Clicks Score (OT)  16  -TO  --        Functional Assessment    Outcome Measure Options  --  AM-PAC 6 Clicks Basic Mobility (PT)  -TW       User Key  (r) = Recorded By, (t) = Taken By, (c) =  Cosigned By    Initials Name Provider Type    TW Abel Wheatley, PTA Physical Therapy Assistant    TO Talon Cavanaugh HAILE/L Occupational Therapy Assistant    Sondra Sweeney HAILE/LIAM Occupational Therapy Assistant    Mikayla Orr OTA Occupational Therapy Assistant           Time Calculation:   Time Calculation- OT     Row Name 01/03/20 1154             Time Calculation- OT    OT Start Time  0900  -CS      OT Stop Time  0956  -CS      OT Time Calculation (min)  56 min  -CS      Total Timed Code Minutes- OT  30 minute(s)  -CS      OT Non-Billable Time (min)  26 min co-tx with PTA.  -CS        User Key  (r) = Recorded By, (t) = Taken By, (c) = Cosigned By    Initials Name Provider Type    CS Sondra Vega HAILE/L Occupational Therapy Assistant        Therapy Charges for Today     Code Description Service Date Service Provider Modifiers Qty    89607944521 HC OT SELF CARE/MGMT/TRAIN EA 15 MIN 1/2/2020 Sondra Vega, HAILE/L GO 1    83080135399 HC OT THERAPEUTIC ACT EA 15 MIN 1/2/2020 Sondra Vega HAILE/L GO 1    74712184593 HC OT THER SUPP EA 15 MIN 1/2/2020 Sondra Vega, HAILE/L GO 2    97465169455 HC OT THER PROC EA 15 MIN 1/3/2020 Sondra Vega HAILE/L GO 2    90386915769 HC OT THER SUPP EA 15 MIN 1/3/2020 Sondra Vega, HAILE/L GO 2               LAZARA Emerson/LIAM  1/3/2020

## 2020-01-03 NOTE — NURSING NOTE
When completing pt handoff pt requested nurse to come to his bedside for a hug. Nursing declined and instead offered pt her hand. Pt then grabbed nurses hand and kissed it attempting to pull nurse closer. As nurse was attempting to pull back pt attempted to pat nurse on the hip.

## 2020-01-03 NOTE — DISCHARGE PLACEMENT REQUEST
"Shirley Sorto (64 y.o. Male)     Date of Birth Social Security Number Address Home Phone MRN    1955  [address] [place] [id number] [phone] [id number]    [facility]          [facility] Single       Admission Date Admission Type Admitting Provider Attending Provider Department, Room/Bed    19 Elective Rufino Lu MD McLaughlin, Steven G, MD [address], /    Discharge Date Discharge Disposition Discharge Destination         Home or Self Care              Attending Provider:  Rufino Lu MD    Allergies:  No Known Allergies    Isolation:  None   Infection:  None   Code Status:  CPR    Ht:  165.1 cm (65\")   Wt:  138 kg (303 lb 12.7 oz)    Admission Cmt:  None   Principal Problem:  None                Active Insurance as of 2019     Primary Coverage     Payor Plan Insurance Group Employer/Plan Group    HUMANA MEDICARE REPLACEMENT HUMANA MEDICARE REPLACEMENT [id number]     Payor Plan Address Payor Plan Phone Number Payor Plan Fax Number Effective Dates    [address] [address] [phone]  2019 - None Entered    [facility] [address]       Subscriber Name Subscriber Birth Date Member ID       SHIRLEY SORTO 1955 [id number]                 Emergency Contacts      (Rel.) Home Phone Work Phone Mobile Phone    ZACH ELIZABETH (Friend) [phone] -- [phone]        [address] [address]  Phone:  [phone]  Fax:   Date: Maximiliano 3, 2020      Ambulatory Referral to Physical Therapy Evaluate and treat (We will keep the immobilizer on until January 10), POST OP; Right (Weight-bear as tolerated); Full weight bearing (In the immobilizer until January 10)     Patient:  Shirley Sorto MRN:  [id number]   [address] [facility]  [facility] [address] :  1955  SSN:    Phone: [phone] Sex:  M      INSURANCE PAYOR PLAN GROUP # SUBSCRIBER ID "   Primary:    HUMANA MEDICARE REPLACEMENT [id number]      Referring Provider Information:  ELSI LU Phone: [phone] Fax:       Referral Information:   # Visits:  1 Referral Type: Therapy [AE1]   Urgency:  Routine Referral Reason: Specialty Services Required   Start Date: Maximiliano 3, 2020 End Date:  To be determined by Insurer   Diagnosis: Painful total knee replacement, initial encounter (CMS/[facility]) (T84.84XA,Z96.659 [ICD-10-CM] 996.77,V43.65,338.18 [ICD-9-CM])      Refer to Dept:   Refer to Provider:   Refer to Facility:       Specialty needed: Evaluate and treat (We will keep the immobilizer on until January 10)  Specialty needed: POST OP  Gait Training: Right (Weight-bear as tolerated)  Weight Bearing Status: Full weight bearing (In the immobilizer until January 10)     This document serves as a request of services and does not constitute Insurance authorization or approval of services.  To determine eligibility, please contact the members Insurance carrier to verify and review coverage.     If you have medical questions regarding this request for services. Please contact [address] at [phone] during normal business hours.       Authorizing Provider:Elsi Lu MD  Authorizing Provider's NPI: [id number]  Order Entered By: Elsi Lu MD 1/3/2020  8:06 AM     Electronically signed by: Elsi Lu MD 1/3/2020  8:06 AM               Operative/Procedure Notes (all)      Elsi Lu MD at 12/26/19 1021  Version 1 of 1         Procedure(s):  RIGHT TOTAL KNEE ARTHROPLASTY REVISION  Procedure Note    Michael Sorto  12/26/2019    Pre-op Diagnosis:   Pyogenic arthritis of right knee joint, due to unspecified organism (CMS/[facility]) [M00.9]  Painful total knee replacement, initial encounter (CMS/[facility]) [T84.84XA, Z96.659]    Post-op Diagnosis:     Post-Op Diagnosis Codes:     * Pyogenic arthritis of right knee joint, due to unspecified  organism (CMS/HCC) [M00.9]     * Painful total knee replacement, initial encounter (CMS/HCC) [T84.84XA, Z96.659]    Procedure/CPT® Codes:      Procedure(s):  1.RIGHT TOTAL KNEE ARTHROPLASTY REVISION-hinged small femur/12 mm hinged insert/2 revision tibia/75 mm stems femur and tibia/antibiotic cement  2.PARTIAL PATTELLECTOMY    Surgeon(s):  Rufino Lu MD    Anesthesia: General    Staff:   Circulator: Harleen Palma, RN; Nguyen Dugan, RN; Nguyen Dugan, YAIMA  Scrub Person: Marie Aranda; Kaleigh Espana  Vendor Representative: Mauricio Alexander Hunter  Assistant: Te Ruiz    Estimated Blood Loss: 250 mL    Specimens:                ID Type Source Tests Collected by Time   A : f.s.right knee, synovium Tissue Knee, Right TISSUE PATHOLOGY EXAM Rufino Lu MD 12/26/2019 1128   B : c and s right knee tissue Tissue Knee, Right ANAEROBIC CULTURE, TISSUE / BONE CULTURE, TISSUE PATHOLOGY EXAM Rufino Lu MD 12/26/2019 1143   C : bone and soft tissue from right knee Bone Knee, Right TISSUE PATHOLOGY EXAM Rufino Lu MD 12/26/2019 1437         Drains:   Closed/Suction Drain 1 Right;Anterior Knee Accordion 10 Fr. (Active)   Site Description Unable to view 12/26/2019  8:14 PM   Dressing Status Intact;Dry;Clean 12/26/2019  8:14 PM   Drainage Appearance Bloody 12/26/2019  8:14 PM   Output (mL) 50 mL 12/27/2019  3:12 AM       [REMOVED] Urethral Catheter Latex 16 Fr. (Removed)       Findings: Broken cement spacers/marked deformity/frozen section with very few 0-1 PMN per high-powered field per Dr. Booth    Complications: None    Dictation: Indications: 84-year-old status post total knee and revision in the past.  Presented with Enterococcus faecalis joint infection was treated with two-stage revision.  We followed his C-reactive protein sed rate down to normal limits.  Repeat aspirate was negative he is for revision at this point.  Other risk and benefits explained on  several visits on multiple occasions the patient including need for above-knee amputation, bleeding, blood clot, infection, neurovascular injury, need for further surgery, failure to resolve his pain, loss of motion, alignment rotational issues, fracture, medical anesthetic complications, etc.  He understands we will proceed as planned.    Procedure: After adequate anesthesia was obtained the patient's leg was prepped draped usual sterile fashion a surgical timeout was performed.    Tourniquet was inflated a longitudinal incision made along the old incision.  Sharp dissection carried down to the retinaculum retinaculum is then developed and the skin flaps were developed sub-cutaneous tissue.  Medial parapatellar incision was opened and a culture was taken the fluid.  Of somewhat cloudy fluid.  Marked scar tissue was encountered and it was opened up.  Skeletonization of the distal femur was done as well as the tibia the cement spacers were broken these were removed in their entirety from the femur and tibia.    Patient had a very thickened patella which was noted.  It was not visible in the thickened soft tissue.  It was a malaligned, it was actually scarred down to the tibia laterally.  Quite about her to get this up in trouble everting it.  A large lateral release was done.  This still made it difficult to leave work.  We approach it subperiosteally and the scar tissue and resected a portion of patella it was stuck down.  We did encounter the plastic poly-utilizing both sides of this this was resected.  There was no way that it was going to articulate with any prosthetic.  Is been quite sometime since this had moved.  We were careful to preserve the extensor mechanism and not disrupt what was left of the patellar tendon.    Once we are able to open up the knee we first paid attention to the tibia.  There is a large defect here and curettes were used for the femur and tibia to remove all scarring cystic changes and  erosions in the bone.  Intramedullary canal was drilled reaming was taken up to a 12 mm insert.  Broaching was then taken up for a sleeve.  We just left the medial hypertrophic bone intact we did not really disturb this much.  Distal cut was made after intramedullary guide.  We used a size 2 tibia with a medium sleeve which was impacted.    Next we paid attention to the femur.  I should note that we sent a frozen section and with this point talk to Dr. Booth will read 0-1 PMNs per high-powered field.    Soft tissue was taken off the femur and a drill was used to gain access to the intramedullary canal.  All cement was removed in this area.  Reaming was taken up to 14 in the femur.  A distal cutting guide was applied followed by the anterior chamfer and block guide.  We sized it for a small hinged knee.  We built the sleeve and reamed up the sleeve followed by a 75 mm stem for both the tibia and femur.  They were put together along with a 12 mm hinged trial.  We cannot get him fully extended therefore made an additional 4 mm cut off of the distal femur and recut the chamfers and box cut.  We could then get the leg fully extended.  Tourniquet was let down after 2 hours bleeding points controlled electrocautery the wound was irrigated copious muscle saline solution.  Marked scar tissue was removed to freshen up to get his leg fully extended posteriorly.  Bleeding points controlled with electrocautery.  We assembled the components of both knees on the table, since the patient had infectious antibiotic cement was utilized.  Cemented the tibia first including the stem as well as the femur.  It was compressed with a trial in place all excess cement was removed.  Full extension with the trial tray was let down bleeding points controlled again.  The tourniquet was let down after 2 hours.  A 12 mm hinged was placed into the tibial insert and fixed with a Khoury pin.  Again full range of motion was used here.  There is  irrigated with copious amounts of saline solution the retinaculum is closed with #1 Vicryl suture.  I should note that could not completely close and more of the patella was removed for the medial aspect of the tendon again with care to preserve the patellar tendon.  The lateral leads was left open a medium Hemovac drain was placed.  Subcutaneous tissues closed with combination of 0 and 2-0 Vicryl in interrupted fashion skin was closed with staples.  Sterile bulky dressing was applied.  And knee mobilizer was applied.  He tolerated the procedure well.    Rufino Lu MD  12/27/19  7:57 AM          Electronically signed by Rufino Lu MD at 12/27/19 9225

## 2020-01-04 VITALS
RESPIRATION RATE: 18 BRPM | OXYGEN SATURATION: 97 % | TEMPERATURE: 98.1 F | DIASTOLIC BLOOD PRESSURE: 74 MMHG | BODY MASS INDEX: 50.62 KG/M2 | HEIGHT: 65 IN | WEIGHT: 303.79 LBS | SYSTOLIC BLOOD PRESSURE: 122 MMHG | HEART RATE: 76 BPM

## 2020-01-04 PROCEDURE — 97110 THERAPEUTIC EXERCISES: CPT

## 2020-01-04 PROCEDURE — 99024 POSTOP FOLLOW-UP VISIT: CPT | Performed by: ORTHOPAEDIC SURGERY

## 2020-01-04 PROCEDURE — 97530 THERAPEUTIC ACTIVITIES: CPT

## 2020-01-04 PROCEDURE — 97116 GAIT TRAINING THERAPY: CPT

## 2020-01-04 PROCEDURE — 97535 SELF CARE MNGMENT TRAINING: CPT

## 2020-01-04 RX ADMIN — SODIUM CHLORIDE, PRESERVATIVE FREE 3 ML: 5 INJECTION INTRAVENOUS at 07:50

## 2020-01-04 RX ADMIN — FERROUS SULFATE TAB EC 324 MG (65 MG FE EQUIVALENT) 324 MG: 324 (65 FE) TABLET DELAYED RESPONSE at 07:49

## 2020-01-04 RX ADMIN — FAMOTIDINE 40 MG: 40 TABLET ORAL at 07:48

## 2020-01-04 RX ADMIN — BUSPIRONE HYDROCHLORIDE 5 MG: 5 TABLET ORAL at 07:49

## 2020-01-04 RX ADMIN — CARVEDILOL 3.12 MG: 3.12 TABLET, FILM COATED ORAL at 07:48

## 2020-01-04 RX ADMIN — ACETAMINOPHEN 1000 MG: 500 TABLET ORAL at 07:48

## 2020-01-04 RX ADMIN — ASPIRIN 325 MG: 325 TABLET, DELAYED RELEASE ORAL at 07:48

## 2020-01-04 NOTE — NURSING NOTE
"When passing pt's meds pt became agitated with nursing stating \"used to you could show someone you appreciated them being a good nurse\"  Educated pt that kissing and touching staff are inappropriate forms of contact.  "

## 2020-01-04 NOTE — PROGRESS NOTES
Ortho:    No orthopedic changes.  No new complaints  Patient is ready to go home.    Vitals:    01/04/20 0746   BP: 139/78   Pulse: 75   Resp: 16   Temp: 98.1 °F (36.7 °C)   SpO2: 96%     Dressing clean and dry  Calf soft  Awake and alert  Toes up and down    S/p revision TKA  D/c home today  F/u with Dr Shah 1 week.    01/04/20 at 8:33 AM by Toribio Carrasco MD

## 2020-01-04 NOTE — PLAN OF CARE
Problem: Patient Care Overview  Goal: Plan of Care Review  1/4/2020 1301 by Julio Guidry PTA  Outcome: Ongoing (interventions implemented as appropriate)  Flowsheets (Taken 1/4/2020 1301)  Progress: improving  Outcome Summary: Pt adwoa tx well with good participation and efforts. Pt t/f sup-sit-sup with SBAx1 with HOB down with bedrail use. Pt t/f sit-stand-sit with CGAx1. PT amb 72' with FWRW with CGAx1 with immoblizer on. Pt R knee ROM: 0-72 AAROM (heelprop with ext). Pt was edu on home safety with pt verbalizing good understanding. No goals met this tx, but progress is noted.

## 2020-01-04 NOTE — THERAPY TREATMENT NOTE
Acute Care - Occupational Therapy Treatment Note  [facility]     Patient Name: Michael Sorto  : 1955  MRN: [id number]  Today's Date: 2020  Onset of Illness/Injury or Date of Surgery: 19  Date of Referral to OT: 19  Referring Physician: Dr. Lu    Admit Date: 2019       ICD-10-CM ICD-9-CM   1. Pyogenic arthritis of right knee joint, due to unspecified organism (CMS/HCC) M00.9 711.06   2. Painful total knee replacement, initial encounter (CMS/HCC) T84.84XA 996.77    Z96.659 V43.65     338.18   3. Impaired functional mobility, balance, gait, and endurance Z74.09 V49.89   4. Impaired mobility and ADLs Z74.09 799.89     Patient Active Problem List   Diagnosis   • Headache   • Nausea   • Nontraumatic intracerebral hemorrhage (CMS/HCC)   • Hypertension   • Morbid obesity (CMS/HCC)   • Cellulitis of left lower extremity   • Intracerebral hemorrhage (CMS/HCC)   • Chronic pain of right knee   • Painful total knee replacement, initial encounter (CMS/HCC)   • Pyogenic arthritis of right knee joint (CMS/HCC)   • Painful total knee replacement (CMS/HCC)     Past Medical History:   Diagnosis Date   • Aortic valvar stenosis    • Cellulitis of right lower extremity    • Chronic pain syndrome    • CVA (cerebral vascular accident) (CMS/HCC)    • Depressive disorder    • GERD (gastroesophageal reflux disease)    • Hypertension    • Injury of lower leg    • Kidney stone    • Morbid obesity (CMS/HCC)    • Painful total knee replacement, initial encounter (CMS/HCC) 3/6/2019   • Pyogenic arthritis of right knee joint (CMS/HCC) 3/6/2019   • Right knee pain      Past Surgical History:   Procedure Laterality Date   • AMPUTATION Left     BKA   • AORTIC VALVE REPAIR/REPLACEMENT     • APPENDECTOMY     • CARDIAC CATHETERIZATION     • REPLACEMENT TOTAL KNEE Right    • TONSILLECTOMY     • TOTAL KNEE  PROSTHESIS REMOVAL W/ SPACER INSERTION Right 2019    Procedure: REMOVAL OF TOTAL KNEE  COMPONENTS RIGHT KNEE WITH INSERTION OF CEMENT ANTIBIOTIC SPACER;  Surgeon: Rufino Lu MD;  Location: [facility];  Service: Orthopedics   • TOTAL KNEE ARTHROPLASTY REVISION Right 12/26/2019    Procedure: RIGHT TOTAL KNEE ARTHROPLASTY REVISION;  Surgeon: Rufino Lu MD;  Location: [facility];  Service: Orthopedics       Therapy Treatment    Rehabilitation Treatment Summary     Row Name 01/04/20 0933 01/04/20 0720          Treatment Time/Intention    Discipline  physical therapy assistant  -EM  occupational therapy assistant  -LW     Document Type  therapy note (daily note)  -EM  therapy note (daily note)  -LW     Subjective Information  no complaints  -EM  no complaints  -LW     Mode of Treatment  physical therapy;individual therapy  -EM  occupational therapy  -LW     Patient/Family Observations  --  Pt supine in bed. No family present.   -LW     Care Plan Review  --  care plan/treatment goals reviewed  -LW     Total Minutes, Occupational Therapy Treatment  --  40  -LW     Therapy Frequency (OT Eval)  --  daily  -LW     Patient Effort  --  adequate  -LW     Existing Precautions/Restrictions  --  fall  -LW     Equipment Issued to Patient  --  gait belt  -LW     Recorded by [EM] Julio Guidry, WADE 01/04/20 1300 [LW] Deloris Hdez, HAILE/L 01/04/20 1310     Row Name 01/04/20 0933 01/04/20 0720          Vital Signs    Pre Systolic BP Rehab  128  -EM  132  -LW     Pre Treatment Diastolic BP  82  -EM  77  -LW     Post Systolic BP Rehab  122  -EM  --     Post Treatment Diastolic BP  74  -EM  --     Pretreatment Heart Rate (beats/min)  75  -EM  71  -LW     Posttreatment Heart Rate (beats/min)  76  -EM  73  -LW     Pre SpO2 (%)  98  -EM  99  -LW     O2 Delivery Pre Treatment  room air  -EM  room air  -LW     Post SpO2 (%)  97  -EM  98  -LW     O2 Delivery Post Treatment  room air  -EM  room air  -LW     Pre Patient Position  --  Supine  -LW     Post Patient Position  --  Sitting long sitting.  -LW      Recorded by [EM] Julio Guidry PTA 01/04/20 1300 [LW] Deloris Hdez COTA/L 01/04/20 1310     Row Name 01/04/20 0933 01/04/20 0720          Cognitive Assessment/Intervention- PT/OT    Affect/Mental Status (Cognitive)  WFL  -EM  WFL  -LW     Orientation Status (Cognition)  oriented x 4  -EM  oriented x 4  -LW     Follows Commands (Cognition)  WFL  -EM  WFL  -LW     Safety Deficit (Cognitive)  --  mild deficit  -LW     Personal Safety Interventions  --  fall prevention program maintained;gait belt;nonskid shoes/slippers when out of bed  -LW     Recorded by [EM] Julio Guidry PTA 01/04/20 1300 [LW] Deloris Hdez COTA/L 01/04/20 1310     Row Name 01/04/20 0720             Safety Issues, Functional Mobility    Safety Issues Affecting Function (Mobility)  ability to follow commands;insight into deficits/self awareness  -LW      Impairments Affecting Function (Mobility)  balance;coordination;endurance/activity tolerance;strength;pain  -LW      Comment, Safety Issues/Impairments (Mobility)  Home safety  -LW2      Recorded by [LW] Deloris Hdez COTA/L 01/04/20 1310  [LW2] Deloris Hdez COTA/L 01/04/20 1317      Row Name 01/04/20 0933 01/04/20 0720          Bed Mobility Assessment/Treatment    Bed Mobility Assessment/Treatment  --  supine-sit;sit-supine  -LW     Supine-Sit [place] (Bed Mobility)  supervision  -EM  supervision  -LW     Sit-Supine [place] (Bed Mobility)  supervision  -EM  supervision  -LW     Assistive Device (Bed Mobility)  bed rails HOB down  -EM  bed rails;head of bed elevated  -LW     Recorded by [EM] Julio Guidry PTA 01/04/20 1300 [LW] Deloris Hdez COTA/L 01/04/20 1317     Row Name 01/04/20 0933             Sit-Stand Transfer    Sit-Stand [place] (Transfers)  contact guard  -EM      Assistive Device (Sit-Stand Transfers)  walker, front-wheeled  -EM      Recorded by [EM] Julio Guidry PTA 01/04/20 1300      Row Name 01/04/20 0933             Stand-Sit Transfer     Stand-Sit [place] (Transfers)  contact guard  -EM      Assistive Device (Stand-Sit Transfers)  walker, front-wheeled  -EM      Recorded by [EM] Julio Guidry PTA 01/04/20 1300      Row Name 01/04/20 0933             Gait/Stairs Assessment/Training    [place] Level (Gait)  contact guard followed with chair  -EM      Assistive Device (Gait)  walker, front-wheeled  -EM      Distance in Feet (Gait)  72'  -EM      Pattern (Gait)  step-to  -EM      Comment (Gait/Stairs)  Immoblizer doffed for ROM then donned for gt/OOB.  -EM      Recorded by [EM] Julio Guidry PTA 01/04/20 1300      Row Name 01/04/20 0720             ADL Assessment/Intervention    BADL Assessment/Intervention  upper body dressing;grooming;toileting  -LW      Recorded by [LW] Deloris Hdez COTA/L 01/04/20 1317      Row Name 01/04/20 0720             Upper Body Dressing Assessment/Training    Upper Body Dressing [place] Level  doff;don;other (see comments) Hospital gown  -LW      Upper Body Dressing Position  edge of bed sitting  -LW      Recorded by [LW] Deloris Hdez COTA/L 01/04/20 1317      Row Name 01/04/20 0720             Grooming Assessment/Training    [place] Level (Grooming)  grooming skills;hair care, combing/brushing;wash face, hands;conditional independence;set up  -LW      Grooming Position  edge of bed sitting  -LW      Recorded by [LW] Deloris Hdez COTA/L 01/04/20 1317      Row Name 01/04/20 0720             Toileting Assessment/Training    [place] Level (Toileting)  adjust/manage clothing;supervision  -LW      Assistive Devices (Toileting)  urinal  -LW      Toileting Position  edge of bed sitting  -LW      Recorded by [LW] Deloris Hdez COTA/L 01/04/20 1317      Row Name 01/04/20 0933             Right Lower Ext    Rt Knee Extension/Flexion AROM  0-72 AAROM (heelprop with ext)  -EM      Recorded by [EM] Julio Guidry PTA 01/04/20 1300      Row Name 01/04/20 0933             Lower Extremity  Seated Therapeutic Exercise    Performed, Seated Lower Extremity (Therapeutic Exercise)  -- Seated HS  -EM      Exercise Type, Seated Lower Extremity (Therapeutic Exercise)  AROM (active range of motion)  -EM      Sets/Reps Detail, Seated Lower Extremity (Therapeutic Exercise)  1x20  -EM      Recorded by [EM] Julio Guidry, WADE 01/04/20 1300      Row Name 01/04/20 0720             Therapeutic Exercise    Upper Extremity Range of Motion (Therapeutic Exercise)  shoulder flexion/extension, bilateral;shoulder internal/external rotation, bilateral;elbow flexion/extension, bilateral  -LW      Weight/Resistance (Therapeutic Exercise)  3 pounds  -LW      Exercise Type (Therapeutic Exercise)  AROM (active range of motion)  -LW      Position (Therapeutic Exercise)  seated  -LW      Sets/Reps (Therapeutic Exercise)  20  -LW      Equipment (Therapeutic Exercise)  free weight, barbell  -LW      Expected Outcome (Therapeutic Exercise)  improve functional tolerance, self-care activity  -LW      Recorded by [LW] Deloris Hdez COTA/L 01/04/20 1317      Row Name 01/04/20 0933 01/04/20 0720          Positioning and Restraints    Pre-Treatment Position  in bed  -EM  in bed  -LW     Post Treatment Position  chair  -EM  bed  -LW     In Bed  --  supine;call light within reach;encouraged to call for assist;exit alarm on  -LW     In Chair  notified nsg;reclined;call light within reach;encouraged to call for assist;exit alarm on;with nsg immoblizer on  -EM  --     Recorded by [EM] Julio Guidry, WADE 01/04/20 1300 [LW] Deloris Hdez COTA/L 01/04/20 1317     Row Name 01/04/20 0933 01/04/20 0720          Pain Scale: Numbers Pre/Post-Treatment    Pain Scale: Numbers, Pretreatment  4/10  -EM  0/10 - no pain  -LW     Pain Scale: Numbers, Post-Treatment  2/10  -EM  0/10 - no pain  -LW     Pain Location - Side  Right  -EM  --     Pain Location  knee  -EM  --     Pain Intervention(s)  Repositioned  -EM  --     Recorded by [EM] Julio Guidry  FLACO, PTA 01/04/20 1300 [LW] Deloris Hdez COTA/L 01/04/20 1317     Row Name                Wound 12/26/19 1246 Right anterior knee Incision    Wound - Properties Group Date first assessed: 12/26/19 [AQ] Time first assessed: 1246 [AQ] Side: Right [AQ] Orientation: anterior [AQ] Location: knee [AQ] Primary Wound Type: Incision [AQ] Recorded by:  [AQ] Nguyen Dugan RN 12/26/19 1246    Row Name 01/04/20 0720             Coping    Observed Emotional State  accepting;cooperative  -LW      Verbalized Emotional State  acceptance  -LW      Recorded by [LW] Deloris Hdez COTA/L 01/04/20 1317      Row Name 01/04/20 0720             Plan of Care Review    Plan of Care Reviewed With  patient  -LW      Progress  improving  -LW      Recorded by [LW] Deloris Hdez COTA/L 01/04/20 1317      Row Name 01/04/20 0720             Outcome Summary/Treatment Plan (OT)    Daily Summary of Progress (OT)  progress toward functional goals as expected  -LW      Plan for Continued Treatment (OT)  Continue POC  -LW      Anticipated Discharge Disposition (OT)  anticipate therapy at next level of care  -LW      Recorded by [LW] Deloris Hdez COTA/L 01/04/20 1317      Row Name 01/04/20 0933             Outcome Summary/Treatment Plan (PT)    Daily Summary of Progress (PT)  progress toward functional goals as expected  -EM      Barriers to Overall Progress (PT)  L BKA  -EM      Plan for Continued Treatment (PT)  Continue  -EM      Anticipated Discharge Disposition (PT)  anticipate therapy at next level of care  -EM      Recorded by [EM] Julio Guidry, WADE 01/04/20 1300        User Key  (r) = Recorded By, (t) = Taken By, (c) = Cosigned By    Initials Name Effective Dates Discipline    EM Julio Guidry, PTA 08/11/15 -  PT    AQ Nguyen Dugan, YAIMA 10/17/16 -  Nurse    LW Deloris Hdez COTA/L 03/07/18 -  OT        Wound 12/26/19 1246 Right anterior knee Incision (Active)   Dressing Appearance moist drainage 1/4/2020 12:00 PM    Closure [place] 1/4/2020 12:00 PM   [place] other (see comments) 1/4/2020  7:51 AM   [place] Temperature warm 1/4/2020 12:00 PM   Drainage Characteristics/Odor serosanguineous 1/4/2020 12:00 PM   Drainage Amount none 1/4/2020 12:00 PM   Dressing Care, Wound gauze, dry;abdominal pad 1/4/2020 12:00 PM     Rehab Goal Summary     Row Name 01/04/20 1300 01/04/20 0720          Bed Mobility Goal 1 (PT)    Activity/Assistive Device (Bed Mobility Goal 1, PT)  bed mobility activities, all  -EM  --     [place] Level/Cues Needed (Bed Mobility Goal 1, PT)  independent;conditional independence  -EM  --     Time Frame (Bed Mobility Goal 1, PT)  short term goal (STG);3 days  -EM  --     Progress/Outcomes (Bed Mobility Goal 1, PT)  (S) goal met  -EM  --        Transfer Goal 1 (PT)    Activity/Assistive Device (Transfer Goal 1, PT)  bed-to-chair/chair-to-bed;toilet  -EM  --     [place] Level/Cues Needed (Transfer Goal 1, PT)  conditional independence  -EM  --     Time Frame (Transfer Goal 1, PT)  long term goal (LTG);by discharge;10 days  -EM  --     Progress/Outcome (Transfer Goal 1, PT)  goal not met  -EM  --        Gait Training Goal 1 (PT)    Activity/Assistive Device (Gait Training Goal 1, PT)  gait (walking locomotion);assistive device use;backward stepping;decrease fall risk;sidestepping;turning, left;turning, right;forward stepping;improve balance and speed;walker, rolling  -EM  --     [place] Level (Gait Training Goal 1, PT)  conditional independence  -EM  --     Distance (Gait Goal 1, PT)  150 ft or more  -EM  --     Time Frame (Gait Training Goal 1, PT)  by discharge;3 weeks;long term goal (LTG)  -EM  --     Progress/Outcome (Gait Training Goal 1, PT)  goal not met  -EM  --        ROM Goal 1 (PT)    ROM Goal 1 (PT)  R knee 0-95 deg AROM  -EM  --     Time Frame (ROM Goal 1, PT)  long term goal (LTG);5 - 7 days  -EM  --     Progress/Outcome (ROM Goal 1, PT)  goal not met  -EM  --        Stairs Goal 1  (PT)    Activity/Assistive Device (Stairs Goal 1, PT)  ascending stairs;descending stairs;assistive device use  -EM  --     [place] Level/Cues Needed (Stairs Goal 1, PT)  conditional independence  -EM  --     Time Frame (Stairs Goal 1, PT)  by discharge;3 weeks  -EM  --     Progress/Outcome (Stairs Goal 1, PT)  goal not met  -EM  --        Occupational Therapy Goals    Transfer Goal Selection (OT)  --  transfer, OT goal 1  -LW     Dressing Goal Selection (OT)  --  dressing, OT goal 1  -LW     Balance Goal Selection (OT)  --  balance, OT goal 1  -LW     Endurance Goal Selection (OT)  --  endurance, OT goal 1  -LW        Transfer Goal 1 (OT)    Activity/Assistive Device (Transfer Goal 1, OT)  --  toilet;commode, bedside with drop arms;commode, bedside without drop arms  -LW     [place] Level/Cues Needed (Transfer Goal 1, OT)  --  moderate assist (50-74% patient effort)  -LW     Time Frame (Transfer Goal 1, OT)  --  long term goal (LTG);by discharge  -LW     Progress/Outcome (Transfer Goal 1, OT)  --  goal not met  -LW        Dressing Goal 1 (OT)    Activity/Assistive Device (Dressing Goal 1, OT)  --  dressing skills, all  -LW     [place]/Cues Needed (Dressing Goal 1, OT)  --  minimum assist (75% or more patient effort)  -LW     Time Frame (Dressing Goal 1, OT)  --  long term goal (LTG);by discharge  -LW     Progress/Outcome (Dressing Goal 1, OT)  --  goal not met;good progress toward goal  -LW        Balance Goal 1 (OT)    Activity/Assistive Device (Balance Goal 1, OT)  --  sitting, dynamic 30 min no LOB and good safety  -LW     [place] Level/Cues Needed (Balance Goal 1, OT)  --  supervision required  -LW     Time Frame (Balance Goal 1, OT)  --  long term goal (LTG);by discharge  -LW     Progress/Outcomes (Balance Goal 1, OT)  --  goal met  -LW         Endurance Goal 1 (OT)    Activity Level (Endurance Goal 1, OT)  --  endurance 2 fair + 25 min functional task 3 or less rest breaks.   -LW     Time  Frame (Endurance Goal 1, OT)  --  long term goal (LTG)  -LW     Progress/Outcome (Endurance Goal 1, OT)  --  goal met  -LW       User Key  (r) = Recorded By, (t) = Taken By, (c) = Cosigned By    Initials Name Provider Type Discipline    EM Julio Guidry, PTA Physical Therapy Assistant PT    Deloris Pop COTA/L Occupational Therapy Assistant OT            OT Recommendation and Plan  Outcome Summary/Treatment Plan (OT)  Daily Summary of Progress (OT): progress toward functional goals as expected  Plan for Continued Treatment (OT): Continue POC  Anticipated Discharge Disposition (OT): anticipate therapy at next level of care  Therapy Frequency (OT Eval): daily  Daily Summary of Progress (OT): progress toward functional goals as expected  Plan of Care Review  Plan of Care Reviewed With: patient  Plan of Care Reviewed With: patient  Outcome Measures     Row Name 01/04/20 0933 01/04/20 0720 01/03/20 1405       How much help from another person do you currently need...    Turning from your back to your side while in flat bed without using bedrails?  4  -EM  --  4  -TW    Moving from lying on back to sitting on the side of a flat bed without bedrails?  4  -EM  --  4  -TW    Moving to and from a bed to a chair (including a wheelchair)?  4  -EM  --  3  -TW    Standing up from a chair using your arms (e.g., wheelchair, bedside chair)?  4  -EM  --  3  -TW    Climbing 3-5 steps with a railing?  2  -EM  --  2  -TW    To walk in hospital room?  3  -EM  --  3  -TW    AM-PAC 6 Clicks Score (PT)  21  -EM  --  19  -TW       How much help from another is currently needed...    Putting on and taking off regular lower body clothing?  --  1  -LW  --    Bathing (including washing, rinsing, and drying)  --  2  -LW  --    Toileting (which includes using toilet bed pan or urinal)  --  2  -LW  --    Putting on and taking off regular upper body clothing  --  3  -LW  --    Taking care of personal grooming (such as brushing teeth)  --  3   -LW  --    Eating meals  --  3  -LW  --    AM-PAC 6 Clicks Score (OT)  --  14  -LW  --       Functional Assessment    Outcome Measure Options  AM-PAC 6 Clicks Basic Mobility (PT)  -EM  --  AM-PAC 6 Clicks Basic Mobility (PT)  -TW    Row Name 01/03/20 1100 01/03/20 0900 01/02/20 1400       How much help from another person do you currently need...    Turning from your back to your side while in flat bed without using bedrails?  --  4  -TW  --    Moving from lying on back to sitting on the side of a flat bed without bedrails?  --  4  -TW  --    Moving to and from a bed to a chair (including a wheelchair)?  --  3  -TW  --    Standing up from a chair using your arms (e.g., wheelchair, bedside chair)?  --  3  -TW  --    Climbing 3-5 steps with a railing?  --  2  -TW  --    To walk in hospital room?  --  3  -TW  --    AM-PAC 6 Clicks Score (PT)  --  19  -TW  --       How much help from another is currently needed...    Putting on and taking off regular lower body clothing?  1  -CS  --  1  -CS    Bathing (including washing, rinsing, and drying)  2  -CS  --  2  -CS    Toileting (which includes using toilet bed pan or urinal)  2  -CS  --  2  -CS    Putting on and taking off regular upper body clothing  3  -CS  --  3  -CS    Taking care of personal grooming (such as brushing teeth)  3  -CS  --  3  -CS    Eating meals  3  -CS  --  3  -CS    AM-PAC 6 Clicks Score (OT)  14  -CS  --  14  -CS       Functional Assessment    Outcome Measure Options  --  AM-PAC 6 Clicks Basic Mobility (PT)  -TW  --    Row Name 01/02/20 0950             How much help from another person do you currently need...    Turning from your back to your side while in flat bed without using bedrails?  4  -TW      Moving from lying on back to sitting on the side of a flat bed without bedrails?  4  -TW      Moving to and from a bed to a chair (including a wheelchair)?  3  -TW      Standing up from a chair using your arms (e.g., wheelchair, bedside chair)?  3   -TW      Climbing 3-5 steps with a railing?  2  -TW      To walk in hospital room?  3  -TW      AM-PAC 6 Clicks Score (PT)  19  -TW         Functional Assessment    Outcome Measure Options  AM-PAC 6 Clicks Basic Mobility (PT)  -TW        User Key  (r) = Recorded By, (t) = Taken By, (c) = Cosigned By    Initials Name Provider Type    EM Julio Guidry, PTA Physical Therapy Assistant    TW Abel Wheatley, PTA Physical Therapy Assistant     Sondra Vega, LAZARA/LIAM Occupational Therapy Assistant    Deloris Pop COTA/L Occupational Therapy Assistant           Time Calculation:   Time Calculation- OT     Row Name 01/04/20 1318             Time Calculation- OT    OT Start Time  0720  -LW      OT Stop Time  0800  -LW      OT Time Calculation (min)  40 min  -LW      Total Timed Code Minutes- OT  40 minute(s)  -LW      OT Received On  01/04/20  -LW        User Key  (r) = Recorded By, (t) = Taken By, (c) = Cosigned By    Initials Name Provider Type    Deloris Pop COTA/L Occupational Therapy Assistant        Therapy Charges for Today     Code Description Service Date Service Provider Modifiers Qty    36882284627 HC OT THER PROC EA 15 MIN 1/4/2020 Deloris Hdez COTA/L GO 2    38025140716 HC OT SELF CARE/MGMT/TRAIN EA 15 MIN 1/4/2020 Deloris Hdez COTA/L GO 1        Non-skid socks and gait belt in place. Toileting offered. Call light and needs within reach. Pt advised to not get up alone and call the nurse for assistance.  Bed alarm on.          MARY Barillas  1/4/2020

## 2020-01-04 NOTE — THERAPY TREATMENT NOTE
Acute Care - Physical Therapy Treatment Note  [facility]     Patient Name: Michael Sorto  : 1955  MRN: [id number]  Today's Date: 2020  Onset of Illness/Injury or Date of Surgery: 19     Referring Physician: Dr. Lu    Admit Date: 2019    Visit Dx:    ICD-10-CM ICD-9-CM   1. Pyogenic arthritis of right knee joint, due to unspecified organism (CMS/[facility]) M00.9 711.06   2. Painful total knee replacement, initial encounter (CMS/[facility]) T84.84XA 996.77    Z96.659 V43.65     338.18   3. Impaired functional mobility, balance, gait, and endurance Z74.09 V49.89   4. Impaired mobility and ADLs Z74.09 799.89     Patient Active Problem List   Diagnosis   • Headache   • Nausea   • Nontraumatic intracerebral hemorrhage (CMS/[facility])   • Hypertension   • Morbid obesity (CMS/[facility])   • Cellulitis of left lower extremity   • Intracerebral hemorrhage (CMS/[facility])   • Chronic pain of right knee   • Painful total knee replacement, initial encounter (CMS/[facility])   • Pyogenic arthritis of right knee joint (CMS/[facility])   • Painful total knee replacement (CMS/[facility])       Therapy Treatment    Rehabilitation Treatment Summary     Row Name 20 0933             Treatment Time/Intention    Discipline  physical therapy assistant  -EM      Document Type  therapy note (daily note)  -EM      Subjective Information  no complaints  -EM      Mode of Treatment  physical therapy;individual therapy  -EM      Recorded by [EM] Julio Guidry, WADE 20 1300      Row Name 20 0933             Vital Signs    Pre Systolic BP Rehab  128  -EM      Pre Treatment Diastolic BP  82  -EM      Post Systolic BP Rehab  122  -EM      Post Treatment Diastolic BP  74  -EM      Pretreatment Heart Rate (beats/min)  75  -EM      Posttreatment Heart Rate (beats/min)  76  -EM      Pre SpO2 (%)  98  -EM      O2 Delivery Pre Treatment  room air  -EM      Post SpO2 (%)  97  -EM      O2 Delivery Post Treatment  room air  -EM      Recorded by [EM]  Julio Guidry, PTA 01/04/20 1300      Row Name 01/04/20 0933             Cognitive Assessment/Intervention- PT/OT    Affect/Mental Status (Cognitive)  WFL  -EM      Orientation Status (Cognition)  oriented x 4  -EM      Follows Commands (Cognition)  WFL  -EM      Recorded by [EM] Julio Guidry, PTA 01/04/20 1300      Row Name 01/04/20 0933             Bed Mobility Assessment/Treatment    Supine-Sit [place] (Bed Mobility)  supervision  -EM      Sit-Supine [place] (Bed Mobility)  supervision  -EM      Assistive Device (Bed Mobility)  bed rails HOB down  -EM      Recorded by [EM] Julio Guidry, PTA 01/04/20 1300      Row Name 01/04/20 0933             Sit-Stand Transfer    Sit-Stand [place] (Transfers)  contact guard  -EM      Assistive Device (Sit-Stand Transfers)  walker, front-wheeled  -EM      Recorded by [EM] Julio Guidry, PTA 01/04/20 1300      Row Name 01/04/20 0933             Stand-Sit Transfer    Stand-Sit [place] (Transfers)  contact guard  -EM      Assistive Device (Stand-Sit Transfers)  walker, front-wheeled  -EM      Recorded by [EM] Julio Guidry, PTA 01/04/20 1300      Row Name 01/04/20 0933             Gait/Stairs Assessment/Training    [place] Level (Gait)  contact guard followed with chair  -EM      Assistive Device (Gait)  walker, front-wheeled  -EM      Distance in Feet (Gait)  72'  -EM      Pattern (Gait)  step-to  -EM      Comment (Gait/Stairs)  Immoblizer doffed for ROM then donned for gt/OOB.  -EM      Recorded by [EM] Julio Guidry, PTA 01/04/20 1300      Row Name 01/04/20 0933             Right Lower Ext    Rt Knee Extension/Flexion AROM  0-72 AAROM (heelprop with ext)  -EM      Recorded by [EM] Julio Guidry, PTA 01/04/20 1300      Row Name 01/04/20 0933             Lower Extremity Seated Therapeutic Exercise    Performed, Seated Lower Extremity (Therapeutic Exercise)  -- Seated HS  -EM      Exercise Type, Seated Lower Extremity (Therapeutic Exercise)  AROM  (active range of motion)  -EM      Sets/Reps Detail, Seated Lower Extremity (Therapeutic Exercise)  1x20  -EM      Recorded by [EM] Julio Guidry PTA 01/04/20 1300      Row Name 01/04/20 0933             Positioning and Restraints    Pre-Treatment Position  in bed  -EM      Post Treatment Position  chair  -EM      In Chair  notified nsg;reclined;call light within reach;encouraged to call for assist;exit alarm on;with nsg immoblizer on  -EM      Recorded by [EM] Julio Guidry PTA 01/04/20 1300      Row Name 01/04/20 0933             Pain Scale: Numbers Pre/Post-Treatment    Pain Scale: Numbers, Pretreatment  4/10  -EM      Pain Scale: Numbers, Post-Treatment  2/10  -EM      Pain Location - Side  Right  -EM      Pain Location  knee  -EM      Pain Intervention(s)  Repositioned  -EM      Recorded by [EM] Julio Guidry PTA 01/04/20 1300      Row Name                Wound 12/26/19 1246 Right anterior knee Incision    Wound - Properties Group Date first assessed: 12/26/19 [AQ] Time first assessed: 1246 [AQ] Side: Right [AQ] Orientation: anterior [AQ] Location: knee [AQ] Primary Wound Type: Incision [AQ] Recorded by:  [AQ] Nguyen Dugan RN 12/26/19 1246    Row Name 01/04/20 0933             Outcome Summary/Treatment Plan (PT)    Daily Summary of Progress (PT)  progress toward functional goals as expected  -EM      Barriers to Overall Progress (PT)  L BKA  -EM      Plan for Continued Treatment (PT)  Continue  -EM      Anticipated Discharge Disposition (PT)  anticipate therapy at next level of care  -EM      Recorded by [EM] Julio Guidry PTA 01/04/20 1300        User Key  (r) = Recorded By, (t) = Taken By, (c) = Cosigned By    Initials Name Effective Dates Discipline    EM Julio Guidry PTA 08/11/15 -  PT    AQ Nguyen Dugan RN 10/17/16 -  Nurse          Wound 12/26/19 1246 Right anterior knee Incision (Active)   Dressing Appearance moist drainage 1/4/2020 12:00 PM   Closure Staples 1/4/2020 12:00 PM    Periwound other (see comments) 1/4/2020  7:51 AM   [place] Temperature warm 1/4/2020 12:00 PM   Drainage Characteristics/Odor serosanguineous 1/4/2020 12:00 PM   Drainage Amount none 1/4/2020 12:00 PM   Dressing Care, Wound gauze, dry;abdominal pad 1/4/2020 12:00 PM       Rehab Goal Summary     Row Name 01/04/20 1300             Bed Mobility Goal 1 (PT)    Activity/Assistive Device (Bed Mobility Goal 1, PT)  bed mobility activities, all  -EM      [place] Level/Cues Needed (Bed Mobility Goal 1, PT)  independent;conditional independence  -EM      Time Frame (Bed Mobility Goal 1, PT)  short term goal (STG);3 days  -EM      Progress/Outcomes (Bed Mobility Goal 1, PT)  (S) goal met  -EM         Transfer Goal 1 (PT)    Activity/Assistive Device (Transfer Goal 1, PT)  bed-to-chair/chair-to-bed;toilet  -EM      [place] Level/Cues Needed (Transfer Goal 1, PT)  conditional independence  -EM      Time Frame (Transfer Goal 1, PT)  long term goal (LTG);by discharge;10 days  -EM      Progress/Outcome (Transfer Goal 1, PT)  goal not met  -EM         Gait Training Goal 1 (PT)    Activity/Assistive Device (Gait Training Goal 1, PT)  gait (walking locomotion);assistive device use;backward stepping;decrease fall risk;sidestepping;turning, left;turning, right;forward stepping;improve balance and speed;walker, rolling  -EM      [place] Level (Gait Training Goal 1, PT)  conditional independence  -EM      Distance (Gait Goal 1, PT)  150 ft or more  -EM      Time Frame (Gait Training Goal 1, PT)  by discharge;3 weeks;long term goal (LTG)  -EM      Progress/Outcome (Gait Training Goal 1, PT)  goal not met  -EM         ROM Goal 1 (PT)    ROM Goal 1 (PT)  R knee 0-95 deg AROM  -EM      Time Frame (ROM Goal 1, PT)  long term goal (LTG);5 - 7 days  -EM      Progress/Outcome (ROM Goal 1, PT)  goal not met  -EM         Stairs Goal 1 (PT)    Activity/Assistive Device (Stairs Goal 1, PT)  ascending stairs;descending  stairs;assistive device use  -EM      [place] Level/Cues Needed (Stairs Goal 1, PT)  conditional independence  -EM      Time Frame (Stairs Goal 1, PT)  by discharge;3 weeks  -EM      Progress/Outcome (Stairs Goal 1, PT)  goal not met  -EM        User Key  (r) = Recorded By, (t) = Taken By, (c) = Cosigned By    Initials Name Provider Type Discipline    EM Julio Guidry, PTA Physical Therapy Assistant PT              PT Recommendation and Plan  Anticipated Discharge Disposition (PT): anticipate therapy at next level of care  Outcome Summary/Treatment Plan (PT)  Daily Summary of Progress (PT): progress toward functional goals as expected  Barriers to Overall Progress (PT): L BKA  Plan for Continued Treatment (PT): Continue  Anticipated Discharge Disposition (PT): anticipate therapy at next level of care  Progress: improving  Outcome Summary: Pt adwoa tx well with good participation and efforts. Pt t/f sup-sit-sup with SBAx1 with HOB down with bedrail use. Pt t/f sit-stand-sit with CGAx1. PT amb 72' with FWRW with CGAx1 with immoblizer on. Pt R knee ROM: 0-72 AAROM (heelprop with ext). Pt was edu on home safety with pt verbalizing good understanding. No goals met this tx, but progress is noted.  Outcome Measures     Row Name 01/04/20 0933 01/03/20 1405 01/03/20 1100       How much help from another person do you currently need...    Turning from your back to your side while in flat bed without using bedrails?  4  -EM  4  -TW  --    Moving from lying on back to sitting on the side of a flat bed without bedrails?  4  -EM  4  -TW  --    Moving to and from a bed to a chair (including a wheelchair)?  4  -EM  3  -TW  --    Standing up from a chair using your arms (e.g., wheelchair, bedside chair)?  4  -EM  3  -TW  --    Climbing 3-5 steps with a railing?  2  -EM  2  -TW  --    To walk in hospital room?  3  -EM  3  -TW  --    AM-PAC 6 Clicks Score (PT)  21  -EM  19  -TW  --       How much help from another is currently  needed...    Putting on and taking off regular lower body clothing?  --  --  1  -CS    Bathing (including washing, rinsing, and drying)  --  --  2  -CS    Toileting (which includes using toilet bed pan or urinal)  --  --  2  -CS    Putting on and taking off regular upper body clothing  --  --  3  -CS    Taking care of personal grooming (such as brushing teeth)  --  --  3  -CS    Eating meals  --  --  3  -CS    AM-PAC 6 Clicks Score (OT)  --  --  14  -CS       Functional Assessment    Outcome Measure Options  AM-PAC 6 Clicks Basic Mobility (PT)  -EM  AM-PAC 6 Clicks Basic Mobility (PT)  -TW  --    Row Name 01/03/20 0900 01/02/20 1400 01/02/20 0950       How much help from another person do you currently need...    Turning from your back to your side while in flat bed without using bedrails?  4  -TW  --  4  -TW    Moving from lying on back to sitting on the side of a flat bed without bedrails?  4  -TW  --  4  -TW    Moving to and from a bed to a chair (including a wheelchair)?  3  -TW  --  3  -TW    Standing up from a chair using your arms (e.g., wheelchair, bedside chair)?  3  -TW  --  3  -TW    Climbing 3-5 steps with a railing?  2  -TW  --  2  -TW    To walk in hospital room?  3  -TW  --  3  -TW    AM-PAC 6 Clicks Score (PT)  19  -TW  --  19  -TW       How much help from another is currently needed...    Putting on and taking off regular lower body clothing?  --  1  -CS  --    Bathing (including washing, rinsing, and drying)  --  2  -CS  --    Toileting (which includes using toilet bed pan or urinal)  --  2  -CS  --    Putting on and taking off regular upper body clothing  --  3  -CS  --    Taking care of personal grooming (such as brushing teeth)  --  3  -CS  --    Eating meals  --  3  -CS  --    AM-PAC 6 Clicks Score (OT)  --  14  -CS  --       Functional Assessment    Outcome Measure Options  AM-PAC 6 Clicks Basic Mobility (PT)  -TW  --  AM-PAC 6 Clicks Basic Mobility (PT)  -TW      User Key  (r) = Recorded By,  (t) = Taken By, (c) = Cosigned By    Initials Name Provider Type    EM Julio Guidry PTA Physical Therapy Assistant    TW Abel Wheatley, WADE Physical Therapy Assistant    CS Sondra Vega, LAZARA/LIAM Occupational Therapy Assistant         Time Calculation:   PT Charges     Row Name 01/04/20 1304             Time Calculation    Start Time  0933  -EM      Stop Time  1026  -EM      Time Calculation (min)  53 min  -EM         Time Calculation- PT    Total Timed Code Minutes- PT  53 minute(s)  -EM        User Key  (r) = Recorded By, (t) = Taken By, (c) = Cosigned By    Initials Name Provider Type    EM Julio Guidry PTA Physical Therapy Assistant        Therapy Charges for Today     Code Description Service Date Service Provider Modifiers Qty    55648171958 HC PT THER PROC EA 15 MIN 1/4/2020 Julio Guidry, PTA GP 1    95032623665 HC PT THERAPEUTIC ACT EA 15 MIN 1/4/2020 Julio Guidry PTA GP 2    50810319829 HC GAIT TRAINING EA 15 MIN 1/4/2020 Julio Guidry, PTA GP 1          PT G-Codes  Outcome Measure Options: AM-PAC 6 Clicks Basic Mobility (PT)  AM-PAC 6 Clicks Score (PT): 21  AM-PAC 6 Clicks Score (OT): 14    Julio Guidry PTA  1/4/2020

## 2020-01-05 ENCOUNTER — READMISSION MANAGEMENT (OUTPATIENT)
Dept: CALL CENTER | Facility: HOSPITAL | Age: 65
End: 2020-01-05

## 2020-01-05 NOTE — OUTREACH NOTE
Prep Survey      Responses   Facility patient discharged from?  [place]   Is patient eligible?  Yes   Discharge diagnosis  RIGHT TOTAL KNEE ARTHROPLASTY REVISION   Does the patient have one of the following disease processes/diagnoses(primary or secondary)?  Total Joint Replacement   Does the patient have Home health ordered?  No   Is there a DME ordered?  No   Prep survey completed?  Yes          Alina Rabago RN

## 2020-01-06 ENCOUNTER — READMISSION MANAGEMENT (OUTPATIENT)
Dept: CALL CENTER | Facility: HOSPITAL | Age: 65
End: 2020-01-06

## 2020-01-06 NOTE — OUTREACH NOTE
Total Joint Week 1 Survey      Responses   Facility patient discharged from?  [place]   Does the patient have one of the following disease processes/diagnoses(primary or secondary)?  Total Joint Replacement   Is there a successful TCM telephone encounter documented?  No   Joint surgery performed?  Knee   Week 1 attempt successful?  No   Unsuccessful attempts  Attempt 1          Susan Guillory RN

## 2020-01-06 NOTE — PAYOR COMM NOTE
"Ankita Davis   [facility]  phone [phone]  fax [phone]    Auth# [id number]    Shirley Sorto (64 y.o. 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 Marital Status          [facility] Single       Admission Date Admission Type Admitting Provider Attending Provider Department, Room/Bed    12/26/19 Elective Rufino Lu MD  [facility] 3 EAST, 361/1    Discharge Date Discharge Disposition Discharge Destination        1/4/2020 Home or Self Care              Attending Provider:  (none)   Allergies:  No Known Allergies    Isolation:  None   Infection:  None   Code Status:  Prior    Ht:  165.1 cm (65\")   Wt:  138 kg (303 lb 12.7 oz)    Admission Cmt:  None   Principal Problem:  None                Active Insurance as of 12/26/2019     Primary Coverage     Payor Plan Insurance Group Employer/Plan Group    HUMANA MEDICARE REPLACEMENT HUMANA MEDICARE REPLACEMENT [id number]     Payor Plan Address Payor Plan Phone Number Payor Plan Fax Number Effective Dates    [address] [address] [phone]  8/1/2019 - None Entered    [facility] [address]       Subscriber Name Subscriber Birth Date Member ID       SHIRLEY SORTO 1955 [id number]                 Emergency Contacts      (Rel.) Home Phone Work Phone Mobile Phone    ZACH ELIZABETH (Friend) [phone] -- [phone]            Discharge Summary    No notes of this type exist for this encounter.         Discharge Order (From admission, onward)     Start     Ordered    01/03/20 0802  Discharge patient  Once     Comments:  Only discharge after seen by Dr. Carrasco on Saturday, January 4   Expected Discharge Date:  01/04/20    Discharge Disposition:  Home or Self Care    Physician of Record for Attribution - Please select from Treatment Team:  RUFINO LU [[id number]]    Review needed by CMO to determine " Physician of Record:  No       Question Answer Comment   Physician of Record for Attribution - Please select from Treatment Team ELSI BARBA    Review needed by CMO to determine Physician of Record No        01/03/20 0806

## 2020-01-06 NOTE — DISCHARGE SUMMARY
ORTHOPEDIC DISCHARGE SUMMARY    NAME: Michael Sorto   PHYSICIAN: Rufino Lu MD  : 1955  MRN: [id number]    ADMITTED: 2019   DISCHARGED: 2020    ADMISSION DIAGNOSES:    Painful total knee replacement, initial encounter (CMS/[facility])    Pyogenic arthritis of right knee joint (CMS/[facility])    Painful total knee replacement (CMS/[facility])      DISCHARGE DIAGNOSES:     Painful total knee replacement, initial encounter (CMS/[facility])    Pyogenic arthritis of right knee joint (CMS/[facility])    Painful total knee replacement (CMS/[facility])      SERVICE: ORTHOPEDIC: Attending, Rufino Lu MD    CONSULTS:     PROCEDURES: Procedure(s):  RIGHT TOTAL KNEE ARTHROPLASTY REVISION    LABS:   Lab Results (last 24 hours)     ** No results found for the last 24 hours. **            SUMMARY: 64-year-old admitted for stage II of a knee revision for chronic infection with Enterococcus faecalis.  He been treated with long-term IV antibiotics.  Cultures were negative.  The significant risk and benefits of been discussed with him thoroughly.  He was admitted he did well with a hinged arthroplasty.  He had a difficult time mobilizing because of swelling in the leg and actually his other leg was swollen cannot fit it into his BKA stump.  He was treated with small amount of diuretic this seemed to help he had persistent drainage from his wound even after having a drain placed.  This required aspiration large amount of old blood x2.  He was placed in a knee immobilizer holding still after the last aspiration and was checked by Dr. Carracso was noted drainage noted in the dressing he was discharged to follow-up in 1 week and call with any problems we will check the wound at that point probably take out the staples and start range of motion is anticipated plan will call sooner with any problems      DISPOSITION: Home      DISCHARGE MEDICATIONS     Discharge Medications      New Medications      Instructions Start Date   ferrous sulfate 324  (65 Fe) MG tablet delayed-release EC tablet   324 mg, Oral, Daily With Breakfast         Changes to Medications      Instructions Start Date   aspirin 325 MG tablet  What changed:  Another medication with the same name was added. Make sure you understand how and when to take each.   325 mg, Oral, 2 Times Daily      aspirin 325 MG EC tablet  What changed:  You were already taking a medication with the same name, and this prescription was added. Make sure you understand how and when to take each.   325 mg, Oral, Every 12 Hours Scheduled         Continue These Medications      Instructions Start Date   amLODIPine 5 MG tablet  Commonly known as:  NORVASC   5 mg, Oral, Daily      busPIRone 5 MG tablet  Commonly known as:  BUSPAR   5 mg, Oral, 2 Times Daily      carvedilol 3.125 MG tablet  Commonly known as:  COREG   3.125 mg, Oral, 2 Times Daily With Meals      cyclobenzaprine 10 MG tablet  Commonly known as:  FLEXERIL   10 mg, Oral, 3 Times Daily PRN      diclofenac 75 MG EC tablet  Commonly known as:  VOLTAREN   75 mg, Oral, 2 Times Daily      docusate sodium 100 MG capsule   100 mg, Oral, 2 Times Daily PRN      lisinopril 10 MG tablet  Commonly known as:  PRINIVIL,ZESTRIL   10 mg, Oral, Daily      ondansetron 4 MG tablet  Commonly known as:  ZOFRAN   4 mg, Oral, Every 6 Hours PRN      terazosin 5 MG capsule  Commonly known as:  HYTRIN   5 mg, Oral, Nightly      traMADol 50 MG tablet  Commonly known as:  ULTRAM   50 mg, Oral, Every 6 Hours PRN         ASK your doctor about these medications      Instructions Start Date   oxyCODONE 5 MG immediate release tablet  Commonly known as:  ROXICODONE  Ask about: Should I take this medication?   5 mg, Oral, Every 4 Hours PRN             INSTRUCTIONS:  Activity: As tolerated  Diet: Regular  Condition: Stable  Special instructions: Patient instructed to call office or call physician through hospital  [phone].    FOLLOW UP:   Additional Instructions for the Follow-ups  that You Need to Schedule     Ambulatory Referral to Physical Therapy Evaluate and treat (S/P RIGHT TOTAL KNEE); Strengthening   As directed      Specialty needed:  Evaluate and treat (S/P RIGHT TOTAL KNEE)    Exercises:  Strengthening         Ambulatory Referral to Physical Therapy Evaluate and treat (We will keep the immobilizer on until January 10), POST OP; Right (Weight-bear as tolerated); Full weight bearing (In the immobilizer until January 10)   As directed      Specialty needed:  Evaluate and treat (We will keep the immobilizer on until January 10) POST OP    Gait Training:  Right (Weight-bear as tolerated)    Weight Bearing Status:  Full weight bearing (In the immobilizer until January 10)         Apply Ice to Affected Knee Every 2 Hours   As directed      Discharge Follow-up with Specified Provider: Aly; 1 Week   As directed      To:  Aly    Follow Up:  1 Week           Follow-up Information     Rossy Arias APRN .    Specialty:  Certified Clinical Nurse Specialist  Contact information:  [address] [facility] [address]  [phone]             Jeny Morataya APRN Follow up.    Specialty:  Family Medicine  Contact information:  [address] [facility] [address]  [phone]                       Rufino Lu MD  01/06/20  2:07 PM    Time: 15 minutes

## 2020-01-08 ENCOUNTER — READMISSION MANAGEMENT (OUTPATIENT)
Dept: CALL CENTER | Facility: HOSPITAL | Age: 65
End: 2020-01-08

## 2020-01-08 NOTE — OUTREACH NOTE
Total Joint Week 1 Survey      Responses   Facility patient discharged from?  [place]   Does the patient have one of the following disease processes/diagnoses(primary or secondary)?  Total Joint Replacement   Is there a successful TCM telephone encounter documented?  No   Joint surgery performed?  Knee   Week 1 attempt successful?  No   Unsuccessful attempts  Attempt 2          Cassandra Fagan RN

## 2020-01-09 DIAGNOSIS — M25.561 CHRONIC PAIN OF RIGHT KNEE: Primary | ICD-10-CM

## 2020-01-09 DIAGNOSIS — Z96.659 PAINFUL TOTAL KNEE REPLACEMENT, INITIAL ENCOUNTER (HCC): ICD-10-CM

## 2020-01-09 DIAGNOSIS — T84.84XA PAINFUL TOTAL KNEE REPLACEMENT, INITIAL ENCOUNTER (HCC): ICD-10-CM

## 2020-01-09 DIAGNOSIS — G89.29 CHRONIC PAIN OF RIGHT KNEE: Primary | ICD-10-CM

## 2020-01-10 ENCOUNTER — OFFICE VISIT (OUTPATIENT)
Dept: ORTHOPEDIC SURGERY | Facility: CLINIC | Age: 65
End: 2020-01-10

## 2020-01-10 VITALS — HEIGHT: 65 IN | WEIGHT: 303 LBS | BODY MASS INDEX: 50.48 KG/M2

## 2020-01-10 DIAGNOSIS — Z96.651 S/P REVISION OF TOTAL KNEE, RIGHT: ICD-10-CM

## 2020-01-10 DIAGNOSIS — M25.561 CHRONIC PAIN OF RIGHT KNEE: Primary | ICD-10-CM

## 2020-01-10 DIAGNOSIS — G89.29 CHRONIC PAIN OF RIGHT KNEE: Primary | ICD-10-CM

## 2020-01-10 PROCEDURE — 20610 DRAIN/INJ JOINT/BURSA W/O US: CPT | Performed by: ORTHOPAEDIC SURGERY

## 2020-01-10 PROCEDURE — 99024 POSTOP FOLLOW-UP VISIT: CPT | Performed by: ORTHOPAEDIC SURGERY

## 2020-01-10 NOTE — PROGRESS NOTES
Michael Sorto is a 64 y.o. male is s/p       Chief Complaint   Patient presents with   • Right Knee - Post-op       HISTORY OF PRESENT ILLNESS:   12/26/19 (15d) Rufino Lu MD     Right Total Knee Arthroplasty Revision - Right     Patient being seen for right knee post-op. X-rays done today. Patient went for therapy evaluation yesterday at [facility]. Will begin therapy soon.  He is a week out.  He is not wearing his immobilizer today as instructed.  Says he has still some drainage from superior portion of the wound.       No Known Allergies      Current Outpatient Medications:   •  amLODIPine (NORVASC) 5 MG tablet, Take 5 mg by mouth Daily., Disp: , Rfl:   •  aspirin 325 MG tablet, Take 325 mg by mouth 2 (Two) Times a Day., Disp: , Rfl:   •  aspirin  MG EC tablet, Take 1 tablet by mouth Every 12 (Twelve) Hours., Disp: 60 tablet, Rfl: 0  •  busPIRone (BUSPAR) 5 MG tablet, Take 5 mg by mouth 2 (Two) Times a Day., Disp: , Rfl:   •  carvedilol (COREG) 3.125 MG tablet, Take 3.125 mg by mouth 2 (Two) Times a Day With Meals., Disp: , Rfl:   •  cyclobenzaprine (FLEXERIL) 10 MG tablet, Take 10 mg by mouth 3 (Three) Times a Day As Needed., Disp: , Rfl:   •  diclofenac (VOLTAREN) 75 MG EC tablet, Take 75 mg by mouth 2 (Two) Times a Day., Disp: , Rfl:   •  docusate sodium 100 MG capsule, Take 100 mg by mouth 2 (Two) Times a Day As Needed for Constipation., Disp: 60 each, Rfl: 0  •  ferrous sulfate 324 (65 Fe) MG tablet delayed-release EC tablet, Take 1 tablet by mouth Daily With Breakfast., Disp: 30 tablet, Rfl: 0  •  lisinopril (PRINIVIL,ZESTRIL) 10 MG tablet, Take 10 mg by mouth Daily., Disp: , Rfl:   •  ondansetron (ZOFRAN) 4 MG tablet, Take 1 tablet by mouth Every 6 (Six) Hours As Needed for Nausea or Vomiting., Disp: 30 tablet, Rfl: 0  •  terazosin (HYTRIN) 5 MG capsule, Take 5 mg by mouth Every Night., Disp: , Rfl:   •  traMADol (ULTRAM) 50 MG tablet, Take 50 mg by mouth Every 6  (Six) Hours As Needed for Moderate Pain ., Disp: , Rfl:         PHYSICAL EXAMINATION:       Michael Sorto is a 64 y.o. male    Patient is awake and alert, answers questions appropriately and is in no apparent distress.    GAIT:     []  Normal  []  Antalgic    Assistive device: []  None  []  Walker     []  Crutches  []  Cane     [x]  Wheelchair  []  Stretcher    Ortho Exam  Wound itself looks good.  Neurologically unchanged.  Swelling is less in the lower leg.  Pretty good effusion about the knee 2-3+.  Does not have full extension.  Xr Knee 1 Or 2 View Right    Result Date: 12/26/2019  Narrative: EXAM:  Radiograph(s), Osseous REGION:   Knee SIDE:     Right VIEWS:   2 INDICATION:    Post-Op Knee Arthoplasty, M00.9 Pyogenic arthritis, unspecified T84.84XA Pain due to internal orthopedic prosthetic devices, implants and grafts, initial encounter Z96.659 Presence of unspecified artificial knee joint COMPARISON:    8/28/19 FINDINGS: Status post total knee arthroplasty with femoral and tibial intramedullary rods. There is a depressed tibial plateau fracture, stabilized with the orthopedic appliance. Attenuated patella, radiographically unchanged.     Impression: CONCLUSION: 1. Postoperative examination, radiographically in standard position. Electronically signed by:  YONI Houston MD  12/26/2019 4:11 PM CST Workstation: [id number]    Us Venous Doppler Lower Extremity Right (duplex)    Result Date: 1/2/2020  Narrative: EXAM: US LOWER EXTREMITY VEINS LIMITED/UNILATERAL/FOLLOW UP HISTORY: RLE swelling, M00.9 Pyogenic arthritis, unspecified T84.84XA Pain due to internal orthopedic prosthetic devices, implants and grafts, initial encounter Z96.659 Presence of unspecified artificial knee joint Z74.09 Other reduced mobility Z74.09 Other reduced mobility COMPARISON STUDY: TECHNIQUE: Grayscale and color Doppler and ultrasound of the right lower extremity was obtained. FINDINGS:  There is complete coaptation with graded  compression at all visualized levels from the common femoral vein to the calf veins. There is augmentation of the venous waveform with calf compression and respiratory variation seen at appropriate levels.  No filling defect is seen.   No Baker's cyst seen. Unremarkable right greater saphenous veins.     Impression: IMPRESSION 1.  No evidence of deep venous thrombosis of the right lower extremity. Electronically signed by:  Saeid Castillo MD  1/2/2020 4:25 PM CST Workstation: [id number]    Large Joint Arthrocentesis: R knee  Date/Time: 1/10/2020 11:40 AM  Consent given by: patient  Site marked: site marked  Timeout: Immediately prior to procedure a time out was called to verify the correct patient, procedure, equipment, support staff and site/side marked as required   Supporting Documentation  Indications: pain   Procedure Details  Location: knee - R knee  Preparation: Patient was prepped and draped in the usual sterile fashion  Needle size: 22 G  Approach: anteromedial  Medication group details: aspiration only   Aspirate amount: 90 mL  Aspirate: bloody  Patient tolerance: patient tolerated the procedure well with no immediate complications      X-ray 2 view show no change from postoperative x-rays      ASSESSMENT:    Diagnoses and all orders for this visit:    Chronic pain of right knee    S/P revision of total knee, right          PLAN I stressed the importance of using his knee immobilizer this drainage stops.  He did not bring it in today.  I re-aspirating the fluid will take his stitches out and Steri-Stripped him.  We will see him in a week coming back sooner with any problems.  We will start physical therapy at that point but did not want him bending it until then.  The knee is not as straight as it was was in the hospital I have encouraged him to get the leg straight and got over x-rays with him here today do show head against extended period with extension I have reassured him that this will cause increased  pressure on the knee and because the fluid to come out it is bending that we will do that and therefore want to keep it straight.  We will see him in 1 week.    Patient's Body mass index is 50.42 kg/m². BMI is above normal parameters. Recommendations include: exercise counseling and nutrition counseling.                  This document has been electronically signed by Natividad Mcconnell MA on January 10, 2020 10:38 AM

## 2020-01-14 ENCOUNTER — READMISSION MANAGEMENT (OUTPATIENT)
Dept: CALL CENTER | Facility: HOSPITAL | Age: 65
End: 2020-01-14

## 2020-01-14 NOTE — OUTREACH NOTE
Total Joint Week 2 Survey      Responses   Facility patient discharged from?  [place]   Does the patient have one of the following disease processes/diagnoses(primary or secondary)?  Total Joint Replacement   Joint surgery performed?  Knee   Week 2 attempt successful?  No   Unsuccessful attempts  Attempt 1          Toy Martinez RN

## 2020-01-16 ENCOUNTER — READMISSION MANAGEMENT (OUTPATIENT)
Dept: CALL CENTER | Facility: HOSPITAL | Age: 65
End: 2020-01-16

## 2020-01-16 NOTE — OUTREACH NOTE
Total Joint Week 2 Survey      Responses   Facility patient discharged from?  [place]   Does the patient have one of the following disease processes/diagnoses(primary or secondary)?  Total Joint Replacement   Joint surgery performed?  Knee   Week 2 attempt successful?  Yes   Call start time  0906   Call end time  0911   Has the patient been back in either the hospital or Emergency Department since discharge?  No   Discharge diagnosis  RIGHT TOTAL KNEE ARTHROPLASTY REVISION   Is patient permission given to speak with other caregiver?  No   Does the patient have all medications related to this admission filled (includes all antibiotics, pain medications, etc.)  Yes   Is the patient taking all medications as directed (includes completed medication regime)?  Yes   Does the patient have a follow up appointment with their surgeon?  Yes   Has the patient kept scheduled appointments due by today?  Yes   Comments  Patient has seen Dr Lu since surgery and seeing again on Friday.    Has home health visited the patient within 72 hours of discharge?  N/A   Psychosocial issues?  No   Has the patient began therapy sessions (either in the home or as an out patient)?  No [Patient states that he has not been given OK from Dr yet to start PT. ]   Does the patient have a wound vac in place?  N/A   Has the patient fallen since discharge?  No   Did the patient receive a copy of their discharge instructions?  Yes   Nursing interventions  Reviewed instructions with patient   What is the patient's perception of their functional status since discharge?  Improving   Is the patient able to teach back signs and symptoms of infection?  Temp >100.4 for 24h or longer, Increased swelling or redness around incision (not associated with surgical edema), Incisional drainage [Patient denies any signs of infection.]   Did the patient implement home safety suggestions from pre-surgery classes if attended?  N/A   Is the patient/caregiver able  to teach back the hierarchy of who to call/visit for symptoms/problems? PCP, Specialist, Home health nurse, Urgent Care, ED, 911  Yes   Week 2 call completed?  Yes          Cassandra Cuevas RN

## 2020-01-17 ENCOUNTER — OFFICE VISIT (OUTPATIENT)
Dept: ORTHOPEDIC SURGERY | Facility: CLINIC | Age: 65
End: 2020-01-17

## 2020-01-17 VITALS — HEIGHT: 65 IN | BODY MASS INDEX: 50.48 KG/M2 | WEIGHT: 303 LBS

## 2020-01-17 DIAGNOSIS — Z89.529 ACQUIRED ABSENCE OF KNEE JOINT FOLLOWING REMOVAL OF JOINT PROSTHESIS WITH PRESENCE OF ANTIBIOTIC-IMPREGNATED CEMENT SPACER: Primary | ICD-10-CM

## 2020-01-17 DIAGNOSIS — Z96.651 S/P REVISION OF TOTAL KNEE, RIGHT: ICD-10-CM

## 2020-01-17 DIAGNOSIS — M25.561 RIGHT KNEE PAIN, UNSPECIFIED CHRONICITY: ICD-10-CM

## 2020-01-17 DIAGNOSIS — M00.9 PYOGENIC ARTHRITIS OF RIGHT KNEE JOINT, DUE TO UNSPECIFIED ORGANISM (HCC): ICD-10-CM

## 2020-01-17 PROCEDURE — 99024 POSTOP FOLLOW-UP VISIT: CPT | Performed by: ORTHOPAEDIC SURGERY

## 2020-01-17 NOTE — PROGRESS NOTES
Michael Sorto is a 64 y.o. male is s/p  Right Total Knee Arthroplasty Revision - Right     Chief Complaint   Patient presents with   • Right Knee - Follow-up       HISTORY OF PRESENT ILLNESS: Patient is here today for follow up of right total knee revision. He states that his pain is 1/10.  No more drainage.       No Known Allergies      Current Outpatient Medications:   •  amLODIPine (NORVASC) 5 MG tablet, Take 5 mg by mouth Daily., Disp: , Rfl:   •  aspirin 325 MG tablet, Take 325 mg by mouth 2 (Two) Times a Day., Disp: , Rfl:   •  aspirin  MG EC tablet, Take 1 tablet by mouth Every 12 (Twelve) Hours., Disp: 60 tablet, Rfl: 0  •  busPIRone (BUSPAR) 5 MG tablet, Take 5 mg by mouth 2 (Two) Times a Day., Disp: , Rfl:   •  carvedilol (COREG) 3.125 MG tablet, Take 3.125 mg by mouth 2 (Two) Times a Day With Meals., Disp: , Rfl:   •  cyclobenzaprine (FLEXERIL) 10 MG tablet, Take 10 mg by mouth 3 (Three) Times a Day As Needed., Disp: , Rfl:   •  diclofenac (VOLTAREN) 75 MG EC tablet, Take 75 mg by mouth 2 (Two) Times a Day., Disp: , Rfl:   •  docusate sodium 100 MG capsule, Take 100 mg by mouth 2 (Two) Times a Day As Needed for Constipation., Disp: 60 each, Rfl: 0  •  ferrous sulfate 324 (65 Fe) MG tablet delayed-release EC tablet, Take 1 tablet by mouth Daily With Breakfast., Disp: 30 tablet, Rfl: 0  •  lisinopril (PRINIVIL,ZESTRIL) 10 MG tablet, Take 10 mg by mouth Daily., Disp: , Rfl:   •  ondansetron (ZOFRAN) 4 MG tablet, Take 1 tablet by mouth Every 6 (Six) Hours As Needed for Nausea or Vomiting., Disp: 30 tablet, Rfl: 0  •  terazosin (HYTRIN) 5 MG capsule, Take 5 mg by mouth Every Night., Disp: , Rfl:   •  traMADol (ULTRAM) 50 MG tablet, Take 50 mg by mouth Every 6 (Six) Hours As Needed for Moderate Pain ., Disp: , Rfl:         PHYSICAL EXAMINATION:       Michael Sorto is a 64 y.o. male    Patient is awake and alert, answers questions appropriately and is in no apparent distress.    GAIT:     []  Normal  []   Antalgic    Assistive device: []  None  [x]  Walker     []  Crutches  []  Cane     []  Wheelchair  []  Stretcher    Ortho Exam  Wound looks good.  Motion is better I can get him actually extended.  He does have about a 10 degree extensor lag.  Flexion to probably 90 degrees today.  Xr Knee 1 Or 2 View Right    Result Date: 1/14/2020  Narrative: 2 views right knee with comparison to hospital film No change in position of hinged knee arthroplasty cemented in place.  Chronic changes seen about patella no definite patella seen without change. Impression: No change seen status post knee revision right    Xr Knee 1 Or 2 View Right    Result Date: 12/26/2019  Narrative: EXAM:  Radiograph(s), Osseous REGION:   Knee SIDE:     Right VIEWS:   2 INDICATION:    Post-Op Knee Arthoplasty, M00.9 Pyogenic arthritis, unspecified T84.84XA Pain due to internal orthopedic prosthetic devices, implants and grafts, initial encounter Z96.659 Presence of unspecified artificial knee joint COMPARISON:    8/28/19 FINDINGS: Status post total knee arthroplasty with femoral and tibial intramedullary rods. There is a depressed tibial plateau fracture, stabilized with the orthopedic appliance. Attenuated patella, radiographically unchanged.     Impression: CONCLUSION: 1. Postoperative examination, radiographically in standard position. Electronically signed by:  YONI Houston MD  12/26/2019 4:11 PM CST Workstation: [id number]    Us Venous Doppler Lower Extremity Right (duplex)    Result Date: 1/2/2020  Narrative: EXAM: US LOWER EXTREMITY VEINS LIMITED/UNILATERAL/FOLLOW UP HISTORY: RLE swelling, M00.9 Pyogenic arthritis, unspecified T84.84XA Pain due to internal orthopedic prosthetic devices, implants and grafts, initial encounter Z96.659 Presence of unspecified artificial knee joint Z74.09 Other reduced mobility Z74.09 Other reduced mobility COMPARISON STUDY: TECHNIQUE: Grayscale and color Doppler and ultrasound of the right lower extremity was  obtained. FINDINGS:  There is complete coaptation with graded compression at all visualized levels from the common femoral vein to the calf veins. There is augmentation of the venous waveform with calf compression and respiratory variation seen at appropriate levels.  No filling defect is seen.   No Baker's cyst seen. Unremarkable right greater saphenous veins.     Impression: IMPRESSION 1.  No evidence of deep venous thrombosis of the right lower extremity. 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  I will check him back in a month.  He can take the Steri-Strips off continue to wash the wound with bacteriostatic soap.  Call me sooner with any problems.                This document has been electronically signed by Rufino Lu MD on January 17, 2020 11:08 AM

## 2020-01-30 ENCOUNTER — READMISSION MANAGEMENT (OUTPATIENT)
Dept: CALL CENTER | Facility: HOSPITAL | Age: 65
End: 2020-01-30

## 2020-01-30 NOTE — OUTREACH NOTE
Total Joint Month 1 Survey      Responses   Facility patient discharged from?  [place]   Does the patient have one of the following disease processes/diagnoses(primary or secondary)?  Total Joint Replacement   Joint surgery performed?  Knee   Month 1 attempt successful?  Yes   Call start time  1656   Call end time  1658   Has the patient been back in either the hospital or Emergency Department since discharge?  No   Discharge diagnosis  RIGHT TOTAL KNEE ARTHROPLASTY REVISION   Is the patient taking all medications as directed (includes completed medication regime)?  Yes   Has the patient kept scheduled appointments due by today?  Yes   Has the patient fallen since discharge?  No   What is the patient's perception of their functional status since discharge?  Improving   Is the patient able to teach back signs and symptoms of infection?  Temp >100.4 for 24h or longer, Increased swelling or redness around incision (not associated with surgical edema), Incisional drainage   Month 1 call completed?  Yes          Julissa Cobian, RN

## 2020-03-02 ENCOUNTER — READMISSION MANAGEMENT (OUTPATIENT)
Dept: CALL CENTER | Facility: HOSPITAL | Age: 65
End: 2020-03-02

## 2020-03-02 NOTE — OUTREACH NOTE
Total Joint Month 2 Survey      Responses   Facility patient discharged from?  [place]   Does the patient have one of the following disease processes/diagnoses(primary or secondary)?  Total Joint Replacement   Joint surgery performed?  Knee   Month 2 attempt successful?  No   Unsuccessful attempts  Attempt 1          Unique Parra RN

## 2020-03-04 ENCOUNTER — READMISSION MANAGEMENT (OUTPATIENT)
Dept: CALL CENTER | Facility: HOSPITAL | Age: 65
End: 2020-03-04

## 2020-03-04 NOTE — OUTREACH NOTE
Total Joint Month 2 Survey      Responses   [facility] patient discharged from?  [place]   Does the patient have one of the following disease processes/diagnoses(primary or secondary)?  Total Joint Replacement   Joint surgery performed?  Knee   Month 2 attempt successful?  No   Unsuccessful attempts  Attempt 2          Ramesh Browne RN

## 2020-04-03 ENCOUNTER — READMISSION MANAGEMENT (OUTPATIENT)
Dept: CALL CENTER | Facility: HOSPITAL | Age: 65
End: 2020-04-03

## 2020-04-03 NOTE — OUTREACH NOTE
Total Joint Month 3 Survey      Responses   [facility] patient discharged from?  [place]   Does the patient have one of the following disease processes/diagnoses(primary or secondary)?  Total Joint Replacement   Joint surgery performed?  Knee   Month 3 attempt successful?  No   Unsuccessful attempts  Attempt 1          Toy Martinez RN

## 2020-04-07 ENCOUNTER — READMISSION MANAGEMENT (OUTPATIENT)
Dept: CALL CENTER | Facility: HOSPITAL | Age: 65
End: 2020-04-07

## 2020-04-07 NOTE — OUTREACH NOTE
Total Joint Month 3 Survey      Responses   [facility] patient discharged from?  [place]   Does the patient have one of the following disease processes/diagnoses(primary or secondary)?  Total Joint Replacement   Joint surgery performed?  Knee   Month 3 attempt successful?  No   Unsuccessful attempts  Attempt 2   Rescheduled  Revoked   Revoke  Decline to participate          Mckayla Oconnor, RN

## 2021-03-19 NOTE — THERAPY EVALUATION
Acute Care - Occupational Therapy Initial Evaluation  [facility]     Patient Name: Michael Sorto  : 1955  MRN: [id number]  Today's Date: 2019  Onset of Illness/Injury or Date of Surgery: (P) 19  Date of Referral to OT: 19  Referring Physician: (P) Dr. Littlejohn     Admit Date: 2019       ICD-10-CM ICD-9-CM   1. Impaired cognition R41.89 294.9   2. Decreased functional mobility and endurance Z74.09 780.99   3. Decreased activities of daily living (ADL) R68.89 780.99     Patient Active Problem List   Diagnosis   • Headache   • Nausea   • Nontraumatic intracerebral hemorrhage (CMS/[facility])   • Hypertension   • Morbid obesity (CMS/[facility])   • Cellulitis of left lower extremity   • Intracerebral hemorrhage (CMS/[facility])     Past Medical History:   Diagnosis Date   • Cellulitis of right lower extremity    • Chronic pain syndrome    • CVA (cerebral vascular accident) (CMS/[facility])    • Depressive disorder    • GERD (gastroesophageal reflux disease)    • Hypertension    • Injury of lower leg    • Kidney stone    • Morbid obesity (CMS/[facility])    • Painful total knee replacement, initial encounter (CMS/[facility]) 3/6/2019   • Pyogenic arthritis of right knee joint (CMS/[facility]) 3/6/2019   • Right knee pain      Past Surgical History:   Procedure Laterality Date   • AMPUTATION Left    • AORTIC VALVE REPAIR/REPLACEMENT     • APPENDECTOMY     • CARDIAC CATHETERIZATION     • REPLACEMENT TOTAL KNEE Right    • TONSILLECTOMY     • TOTAL KNEE  PROSTHESIS REMOVAL W/ SPACER INSERTION Right 2019    Procedure: REMOVAL OF TOTAL KNEE COMPONENTS RIGHT KNEE WITH INSERTION OF CEMENT ANTIBIOTIC SPACER;  Surgeon: Rufino Lu MD;  Location: [facility];  Service: Orthopedics          OT ASSESSMENT FLOWSHEET (last 12 hours)      Occupational Therapy Evaluation     Row Name 19 0718                   OT Evaluation Time/Intention    Subjective Information  complains of;pain  -JJ        Document Type  evaluation see  MAR  -JJ        Mode of Treatment  occupational therapy;concurrent therapy  -JJ        Patient Effort  good  -JJ        Symptoms Noted During/After Treatment  none  -JJ           General Information    Patient Profile Reviewed?  yes  -JJ        Onset of Illness/Injury or Date of Surgery  08/07/19  -JJ        Referring Physician  Dr. Littlejohn   -JJ        Patient Observations  alert;cooperative;agree to therapy  -JJ        Patient/Family Observations  family present in room   -JJ        General Observations of Patient  sitting up in bed, IV infusing, alert   -JJ        Prior Level of Function  transfer;independent:;all household mobility;bed mobility;grooming;dressing;bathing w/c for mobility.  -JJ        Equipment Currently Used at Home  walker, rolling;rollator;wheelchair;bath bench;grab bar  -JJ        Pertinent History of Current Functional Problem  Pt t/f from [facility] d/t nontraumatic ICH. Pt presents with worsening headache, nausea/vomiting, blurred vision and difficulty with depth preception. PMH significant for CVA in 2011, HTN, BKA of LLE in 2010, cellulitis of the R LE with recently on IV antibiotics, depression and morbid obesity. Imaging shows an acute parafalcine, L tenetorial and L convexity subdural hemorrhage, L occipital intraparenchymal hemorrhage with surrounding edema, L subdural hemorrhage with a 4 mm left to right shift and an area in the R frontal parietal region which appears to reflect chronic ischemia. Pt underwent R TKA, in June prothesis was removed and antibotic spacer was placed. Pt placed in knee immobilizer and NWB RLE. Dx: nontraumatic ICH, cellulitis of RLE, HTN.   -JJ        Existing Precautions/Restrictions  fall;non-weight bearing  (Significant)  NWB RLE; BKA LLE.   -JJ        Risks Reviewed  patient:;LOB;nausea/vomiting;dizziness;increased discomfort;change in vital signs;increased drainage;lines disloged  -JJ        Benefits Reviewed  patient:;improve function;increase  independence;increase strength;increase balance;decrease pain;decrease risk of DVT;improve skin integrity;increase knowledge  -        Barriers to Rehab  previous functional deficit  -J           Relationship/Environment    Primary Source of Support/Comfort  friend  -J        Lives With  alone  -        Primary Roles/Responsibilities  disabled  -JJ           Resource/Environmental Concerns    Current Living Arrangements  home/apartment/condo  -J        Resource/Environmental Concerns  none  -J           Cognitive Assessment/Interventions    Additional Documentation  Cognitive Assessment/Intervention (Group)  -           Cognitive Assessment/Intervention- PT/OT    Affect/Mental Status (Cognitive)  WFL  -        Orientation Status (Cognition)  oriented x 4  -J        Follows Commands (Cognition)  [facility]  -        Cognitive Function (Cognitive)  [facility]  -        Personal Safety Interventions  fall prevention program maintained;gait belt;muscle strengthening facilitated;nonskid shoes/slippers when out of bed;supervised activity  -           Safety Issues, Functional Mobility    Safety Issues Affecting Function (Mobility)  insight into deficits/self awareness;awareness of need for assistance;safety precaution awareness;safety precautions follow-through/compliance  -        Impairments Affecting Function (Mobility)  balance;coordination;endurance/activity tolerance;pain;strength;shortness of breath  -J           Mobility Assessment/Treatment    Extremity Weight-bearing Status  right lower extremity  (Significant)   -        Right Lower Extremity (Weight-bearing Status)  non weight-bearing (NWB)  (Significant)   -           Bed Mobility Assessment/Treatment    Bed Mobility Assessment/Treatment  supine-sit  -J        Supine-Sit [place] (Bed Mobility)  supervision  -        Bed Mobility, Safety Issues  decreased use of legs for bridging/pushing  -        Assistive Device (Bed Mobility)  head of  bed elevated;bed rails  -           Functional Mobility    Functional Mobility- Ind. Level  minimum assist (75% patient effort);2 person assist required  -        Functional Mobility- Device  rolling walker  -        Functional Mobility-Maintain WBing  cues to maintain weight bearing status  -        Functional Mobility- Comment  took steps from bed to chair  -           Transfer Assessment/Treatment    Transfer Assessment/Treatment  sit-stand transfer;stand-sit transfer  -        Maintains Weight-bearing Status (Transfers)  physical assist to maintain  -           Sit-Stand Transfer    Sit-Stand [place] (Transfers)  minimum assist (75% patient effort);2 person assist  -        Assistive Device (Sit-Stand Transfers)  walker, front-wheeled  -           Stand-Sit Transfer    Stand-Sit [place] (Transfers)  minimum assist (75% patient effort);2 person assist  -        Assistive Device (Stand-Sit Transfers)  walker, front-wheeled  -           ADL Assessment/Intervention    BADL Assessment/Intervention  lower body dressing  -           Lower Body Dressing Assessment/Training    Lower Body Dressing [place] Level  don;minimum assist (75% patient effort);independent Min A for knee immobilizer; Independent for prothesis.   -        Lower Body Dressing Position  edge of bed sitting;long sitting  -           BADL Safety/Performance    Impairments, BADL Safety/Performance  balance;endurance/activity tolerance;coordination;pain;strength;shortness of breath  -        Skilled BADL Treatment/Intervention  BADL process/adaptation training  -           General ROM    GENERAL ROM COMMENTS  BUE AROM WFL   -           MMT (Manual Muscle Testing)    General MMT Comments  BUE strength 4/5  -           Motor Assessment/Interventions    Additional Documentation  Balance (Group)  -           Balance    Balance  static sitting balance;static standing balance  -           Static Sitting  Balance    Level of [place] (Unsupported Sitting, Static Balance)  supervision  -JJ        Sitting Position (Unsupported Sitting, Static Balance)  sitting on edge of bed  -JJ        Time Able to Maintain Position (Unsupported Sitting, Static Balance)  more than 5 minutes  -JJ           Static Standing Balance    Level of [place] (Supported Standing, Static Balance)  minimal assist, 75% patient effort;2 person assist  -JJ        Time Able to Maintain Position (Supported Standing, Static Balance)  45 to 60 seconds  -JJ        Assistive Device Utilized (Supported Standing, Static Balance)  walker, rolling;prosthesis  -JJ           Sensory Assessment/Intervention    Sensory General Assessment  no sensation deficits identified  -JJ           Positioning and Restraints    Pre-Treatment Position  in bed  -JJ        Post Treatment Position  chair  -JJ        In Chair  reclined;notified nsg;call light within reach;encouraged to call for assist  -JJ           Pain Assessment    Additional Documentation  Pain Scale: Numbers Pre/Post-Treatment (Group)  -JJ           Pain Scale: Numbers Pre/Post-Treatment    Pain Scale: Numbers, Pretreatment  5/10  -JJ        Pain Scale: Numbers, Post-Treatment  5/10  -JJ        Pain Location - Orientation  posterior  -JJ        Pain Location  head  -JJ        Pain Intervention(s)  Repositioned;Ambulation/increased activity  -JJ           Plan of Care Review    Plan of Care Reviewed With  patient  -JJ           Clinical Impression (OT)    Date of Referral to OT  08/07/19  -JJ        OT Diagnosis  decreased adls.   -JJ        Prognosis (OT Eval)  good  -JJ        Patient/Family Goals Statement (OT Eval)  return home  -JJ        Criteria for Skilled Therapeutic Interventions Met (OT Eval)  yes;treatment indicated  -JJ        Rehab Potential (OT Eval)  good, to achieve stated therapy goals  -JJ        Therapy Frequency (OT Eval)  5 times/wk  -JJ        Predicted Duration of Therapy  Intervention (Therapy Eval)  until d/c from facility  -JJ        Care Plan Review (OT)  evaluation/treatment results reviewed;care plan/treatment goals reviewed;risks/benefits reviewed;current/potential barriers reviewed;patient/other agree to care plan  -JJ        Anticipated Discharge Disposition (OT)  skilled nursing facility  -JJ           Vital Signs    Pre Systolic BP Rehab  113  -JJ        Pre Treatment Diastolic BP  73  -JJ        Post Systolic BP Rehab  123  -JJ        Post Treatment Diastolic BP  69  -JJ        Pretreatment Heart Rate (beats/min)  63  -JJ        Posttreatment Heart Rate (beats/min)  59  -JJ        Pretreatment Resp Rate (breaths/min)  16  -JJ        Posttreatment Resp Rate (breaths/min)  19  -JJ        Pre SpO2 (%)  97  -JJ        O2 Delivery Pre Treatment  room air  -JJ        Post SpO2 (%)  97  -JJ        O2 Delivery Post Treatment  room air  -JJ        Pre Patient Position  Supine  -JJ        Intra Patient Position  Standing  -JJ        Post Patient Position  Sitting  -JJ           Planned OT Interventions    Planned Therapy Interventions (OT Eval)  activity tolerance training;BADL retraining;functional balance retraining;neuromuscular control/coordination retraining;occupation/activity based interventions;orthotic fabrication/fitting/training;patient/caregiver education/training;transfer/mobility retraining;strengthening exercise  -JJ           OT Goals    Transfer Goal Selection (OT)  transfer, OT goal 1  -JJ        Dressing Goal Selection (OT)  dressing, OT goal 1  -JJ        Strength Goal Selection (OT)  strength, OT goal 1  -JJ        Additional Documentation  Strength Goal Selection (OT) (Row)  -JJ           Transfer Goal 1 (OT)    Activity/Assistive Device (Transfer Goal 1, OT)  transfers, all  -JJ        [place] Level/Cues Needed (Transfer Goal 1, OT)  standby assist  -JJ        Time Frame (Transfer Goal 1, OT)  long term goal (LTG);by discharge  -JJ        Progress/Outcome  (Transfer Goal 1, OT)  goal ongoing  -           Dressing Goal 1 (OT)    Activity/Assistive Device (Dressing Goal 1, OT)  dressing skills, all  -J        [place]/Cues Needed (Dressing Goal 1, OT)  supervision required  -JJ        Time Frame (Dressing Goal 1, OT)  long term goal (LTG);by discharge  -JJ        Progress/Outcome (Dressing Goal 1, OT)  goal ongoing  -           Strength Goal 1 (OT)    Strength Goal 1 (OT)  Pt will increase BUE strength to 4+/5 for increased independence with adls, t/fs, and mobility.   -JJ        Time Frame (Strength Goal 1, OT)  long term goal (LTG);by discharge  -J        Progress/Outcome (Strength Goal 1, OT)  goal ongoing  -           Living Environment    Home Accessibility  wheelchair accessible;tub/shower is not walk in  -          User Key  (r) = Recorded By, (t) = Taken By, (c) = Cosigned By    Initials Name Effective Dates    KAREN Susan Snyder OTR/L 10/12/18 -          Occupational Therapy Education     Title: PT OT SLP Therapies (In Progress)     Topic: Occupational Therapy (In Progress)     Point: ADL training (Done)     Description: Instruct learner(s) on proper safety adaptation and remediation techniques during self care or transfers.   Instruct in proper use of assistive devices.    Learning Progress Summary           Patient Acceptance, E, VU by ARCHIE at 8/8/2019 12:03 PM    Comment:  OT POC, NWB RLE, assistive device use, t/fs, bed mobility, d/c planning.                   Point: Precautions (Done)     Description: Instruct learner(s) on prescribed precautions during self-care and functional transfers.    Learning Progress Summary           Patient Acceptance, E, VU by ARCHIE at 8/8/2019 12:03 PM    Comment:  OT POC, NWB RLE, assistive device use, t/fs, bed mobility, d/c planning.                   Point: Body mechanics (Done)     Description: Instruct learner(s) on proper positioning and spine alignment during self-care, functional mobility activities  and/or exercises.    Learning Progress Summary           Patient Acceptance, E, VU by ARCHIE at 8/8/2019 12:03 PM    Comment:  OT POC, NWB RLE, assistive device use, t/fs, bed mobility, d/c planning.                               User Key     Initials Effective Dates Name Provider Type Discipline    ARCHIE 10/12/18 -  Susan Snyder, OTR/L Occupational Therapist OT                  OT Recommendation and Plan  Outcome Summary/Treatment Plan (OT)  Anticipated Discharge Disposition (OT): skilled nursing facility  Planned Therapy Interventions (OT Eval): activity tolerance training, BADL retraining, functional balance retraining, neuromuscular control/coordination retraining, occupation/activity based interventions, orthotic fabrication/fitting/training, patient/caregiver education/training, transfer/mobility retraining, strengthening exercise  Therapy Frequency (OT Eval): 5 times/wk  Plan of Care Review  Plan of Care Reviewed With: patient  Plan of Care Reviewed With: patient  Outcome Summary: OT eval completed. Pt is A&Ox4. Pt required supervision/CGA for supine to sit at EOB. Min A for donning knee immobilizer and Independently able to don LLE prothesis. Pt Min/Mod Ax2 for sit <> stand t/f from EOB with rwx. Required verbal/tactile cues to maintain NWB LLE. Able to take steps from bed to chair with Min/Mod Ax2 and rwx. Pt would benefit from skilled OT services to address decreased strength, balance, activity tolerance, SOB and increased pain. Recommend d/c to SNF for continued rehab.     Outcome Measures     Row Name 08/08/19 1200 08/08/19 0756          How much help from another person do you currently need...    Turning from your back to your side while in flat bed without using bedrails?  --  4  (Pended)   -SC     Moving from lying on back to sitting on the side of a flat bed without bedrails?  --  3  (Pended)   -SC     Moving to and from a bed to a chair (including a wheelchair)?  --  2  (Pended)   -SC     Standing  up from a chair using your arms (e.g., wheelchair, bedside chair)?  --  3  (Pended)   -SC     Climbing 3-5 steps with a railing?  --  1  (Pended)   -SC     To walk in hospital room?  --  2  (Pended)   -SC     AM-PAC 6 Clicks Score (PT)  --  15  -WG (r) SC (t)        How much help from another is currently needed...    Putting on and taking off regular lower body clothing?  3  -JJ  --     Bathing (including washing, rinsing, and drying)  3  -JJ  --     Toileting (which includes using toilet bed pan or urinal)  3  -JJ  --     Putting on and taking off regular upper body clothing  4  -JJ  --     Taking care of personal grooming (such as brushing teeth)  4  -JJ  --     Eating meals  4  -JJ  --     AM-PAC 6 Clicks Score (OT)  21  -JJ  --        Functional Assessment    Outcome Measure Options  AM-PAC 6 Clicks Daily Activity (OT)  -  AM-PAC 6 Clicks Basic Mobility (PT)  (Pended)   -SC       User Key  (r) = Recorded By, (t) = Taken By, (c) = Cosigned By    Initials Name Provider Type    Devora Bermudez RN Registered Nurse    Susan Kumar, OTR/L Occupational Therapist    SC Kayden, PT Student PT Student          Time Calculation:   Time Calculation- OT     Row Name 08/08/19 1200             Time Calculation- OT    OT Start Time  0750 add 20 minutes for chart review   -      OT Stop Time  0858  -      OT Time Calculation (min)  68 min  -      Total Timed Code Minutes- OT  28 minute(s)  -      OT Received On  08/08/19  -      OT Goal Re-Cert Due Date  08/18/19  -         Timed Charges    76451 - OT Therapeutic Activity Minutes  14  -      77659 - OT Self Care/Mgmt Minutes  14  -        User Key  (r) = Recorded By, (t) = Taken By, (c) = Cosigned By    Initials Name Provider Type    Susan Kumar, OTR/L Occupational Therapist        Therapy Charges for Today     Code Description Service Date Service Provider Modifiers Qty    90602546140  OT EVAL MOD COMPLEXITY 4 8/8/2019  Susan Snyder OTR/L GO 1    29163913498 HC OT THERAPEUTIC ACT EA 15 MIN 8/8/2019 Susan Snyder OTR/L GO 1    [id number] HC OT SELF CARE/MGMT/TRAIN EA 15 MIN 8/8/2019 Susan Snyder OTR/L GO 1               RONN Schultz/LIAM  8/8/2019   Never smoker

## 2022-01-01 NOTE — PROGRESS NOTES
Neurosurgery Daily Progress Note    Assessment:   Michael Sorto is a 64 y.o. 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  He presents with a new problem of worsening headaches and nausea.  Physical exam findings of alert and oriented, no pronator drift or dysmetria, follow simple commands, GCS 15; edema, erythema, and warmth to right lower extremity.  Their imaging shows an acute parafalcine, left tentorial and left convexity subdural hemorrhage, left occipital intraparenchymal hemorrhage with surrounding edema, left subdural hemorrhage with a 4 mm left to right midline shift, and an area in the right frontal parietal region which appears to reflect chronic ischemia.    DDX:     Nontraumatic intracerebral hemorrhage (CMS/HCC)    Headache    Nausea    Hypertension    Morbid obesity (CMS/HCC)    Cellulitis of left lower extremity    Intracerebral hemorrhage (CMS/HCC)    Patient Active Problem List   Diagnosis   • Headache   • Nausea   • Nontraumatic intracerebral hemorrhage (CMS/HCC)   • Hypertension   • Morbid obesity (CMS/HCC)   • Cellulitis of left lower extremity   • Intracerebral hemorrhage (CMS/HCC)     Plan:   Neuro: Stable.  Neuro intact/unchanged   CTA head stable   Neurochecks every hour   Prophylactic Keppra   Hypertonic 3% saline: 25 ML/HR   Na goal 145-150, currently 141    Osmo < 320, currently to 87     CV: Titrate Cardene to keep SBP less than 140; PRN labetalol    SBP   Pulm: Maintaining O2 sat.  Continuous pulse oximetry.  Incentive spirometry  : Voiding spontaneously  FEN: Tolerating oral diet.  GI: DAVID.  ID: Amoxicillin for treatment of RLE cellulitis   Appreciate hospitalist  Heme:  DVT prophylaxis held for brain bleed; SCDs; BMP today  Pain: Tolerable at present  Dispo: PT/OT    Chief complaint:  "  Worsening headache and nausea    Subjective  Symptoms stable.  Doing well    Temp:  [97.9 °F (36.6 °C)-98.7 °F (37.1 °C)] 97.9 °F (36.6 °C)  Heart Rate:  [55-83] 64  Resp:  [13-18] 16  BP: ()/() 115/87    Objective:  General Appearance:  Comfortable, well-appearing, in no acute distress and not in pain.    Vital signs: (most recent): Blood pressure 115/87, pulse 64, temperature 97.9 °F (36.6 °C), temperature source Oral, resp. rate 16, height 165.1 cm (65\"), weight (!) 138 kg (303 lb 6.4 oz), SpO2 100 %.      Neurologic Exam     Mental Status   Oriented to person, place, and time.   Attention: normal. Concentration: normal.   Speech: speech is normal   Level of consciousness: alert  Follow simple commands     Cranial Nerves     CN II   Visual fields full to confrontation.     CN III, IV, VI   Pupils are equal, round, and reactive to light.  Extraocular motions are normal.     CN V   Facial sensation intact.     CN VII   Facial expression full, symmetric.     CN VIII   CN VIII normal.     CN IX, X   CN IX normal.     CN XI   CN XI normal.     Motor Exam   Muscle bulk: normal  Overall muscle tone: normal  Right arm tone: normal  Left arm tone: normal  Right arm pronator drift: absent  Left arm pronator drift: absent  Right leg tone: normal  Left leg tone: normal    Strength   Right deltoid: 5/5  Left deltoid: 5/5  Right biceps: 5/5  Left biceps: 5/5  Right triceps: 5/5  Left triceps: 5/5  Right wrist extension: 5/5  Left wrist extension: 5/5  Right iliopsoas: 5/5  Left iliopsoas: 4/5  Right quadriceps: 5/5  Right anterior tibial: 5/5  Right posterior tibial: 5/5  No dysmetria noted     Sensory Exam   Left leg light touch: decreased from knee    Gait, Coordination, and Reflexes     Tremor   Resting tremor: absent  Intention tremor: absent  Action tremor: absent    Reflexes   Right plantar: normal  Right Mcbride: absent  Left Mcbride: absent  Right ankle clonus: absent  Right pendular knee jerk: " absent  Left pendular knee jerk: absent    Drains: NA    Imaging Results (last 24 hours)     Procedure Component Value Units Date/Time    CT Angiogram Head With & Without Contrast [[id number]] Collected:  08/07/19 2235     Updated:  08/07/19 2247    Narrative:       EXAMINATION: CT angiogram of the head with contrast 08/07/2019     DOSE: 1052 mGycm. Automated exposure control was utilized to diminish  patient radiation dose..     HISTORY: Intracranial hemorrhage     FINDINGS: Multiple contiguous axial images are obtained through the  [place] at 1 mm intervals following intravenous contrast  administration with reformatted images obtained in the sagittal and  coronal projections from the original dataset as well as MIPS.     Within the left occipital lobe there is a intraparenchymal hemorrhage  measuring approximate 3.2 x 2.9 cm in size. There is also some subdural  hemorrhage layering along the left tentorium cerebelli and extending  into the posterior interhemispheric fissure     The distal vertebral arteries are widely patent. The basilar artery is  normal in caliber. The superior cerebellar and posterior cerebral  arteries are also normal in caliber without evidence of focal  steno-occlusive disease or discrete berry aneurysm.     There is mild calcific plaquing involving the cavernous and supraclinoid  aspect of both ICAs without evidence of high-grade stenosis. The  anterior and middle cerebral arteries are normal in caliber without  evidence of discrete intraluminal thrombus, focal steno-occlusive  disease or discrete berry aneurysm. No discrete vascular malformations  are demonstrated. No evidence of dural venous sinus thrombosis. There is  some asymmetry within the left transverse sinus which could be in part  related to some mass effect related to the rind of edema surrounding the  intraparenchymal hemorrhage. There is also hypodensity within the right  posterior parietal and occipital lobe which  has the appearance of a  subacute infarct.       Impression:       1.. Essentially normal posterior circulation without evidence of focal  steno-occlusive disease or discrete berry aneurysm.  2. Mild calcific plaquing involving the cavernous and supraclinoid  aspect of both ICAs with the anterior circulation otherwise  unremarkable.  3. Left occipital intraparenchymal hematoma with surrounding vasogenic  edema. There is also a subdural component which is parafalcine and left  tentorial in location measuring less than a centimeter in thickness.  This report was finalized on 08/07/2019 22:44 by Dr. Caleb Moreno MD.    CT Head Without Contrast [[id number]] Collected:  08/07/19 1441     Updated:  08/07/19 1455    Narrative:       CT HEAD WO CONTRAST- 8/7/2019 1:48 PM CDT     HISTORY: Intracranial hemorrhage       DOSE LENGTH PRODUCT: 542 mGy cm. Automated exposure control was also  utilized to decrease patient radiation dose.     Technique:   Axial CT of the brain without IV contrast. Sagittal and coronal  reformations are also provided for review. Soft tissue and bone kernels  are available for interpretation.     Comparison: None.     Findings:      Approximately 3.6 x 4.3 x 2.5 cm left occipital intraparenchymal  hematoma (series 7 image image 18 and series 3-image 17). There is a  collar of surrounding mild to moderate vasogenic edema. There is an area  of low-attenuation in the right frontoparietal region which appears to  reflect an area of subacute to chronic ischemia. Acute parafalcine, left  tentorial and left convexity subdural hemorrhage is also identified. The  left cerebral convexity components of the subdural hemorrhage measures  up to 0.6 cm in greatest thickness. Mass effect from this hemorrhage  effaces the left lateral ventricle and there is an approximately 4 mm  left-to-right midline shift. The third ventricle is also effaced. No  trapping of the right lateral ventricle. The basal cisterns  are  symmetric. No subarachnoid hemorrhage identified. No calvarial fracture.     Small retention cyst in the left sphenoid sinus. The paranasal sinuses  are otherwise clear. The mastoids are clear.       Impression:       Impression:    1. Left occipital intraparenchymal hematoma measuring up to 4.3 cm.  2. Acute left cerebral convexity, parafalcine and left tentorial  subdural hemorrhage. This measures up to 0.6 cm in thickness along the  left frontal convexity.  3. Associated 4 mm left-to-right midline shift. No trapping of the right  lateral ventricle.  This report was finalized on 08/07/2019 14:52 by Dr Clayton Samayoa, .        Lab Results (last 24 hours)     Procedure Component Value Units Date/Time    Osmolality, Serum [[id number]]  (Normal) Collected:  08/08/19 0615    Specimen:  Blood Updated:  08/08/19 0724     Osmolality 290 mOsm/kg     Sodium [[id number]]  (Normal) Collected:  08/08/19 0615    Specimen:  Blood Updated:  08/08/19 0642     Sodium 140 mmol/L     POC Glucose Once [[id number]]  (Normal) Collected:  08/08/19 0525    Specimen:  Blood Updated:  08/08/19 0537     Glucose 95 mg/dL      Comment: : [id number] Samir  [facility] ID: [id number]       Osmolality, Serum [[id number]]  (Abnormal) Collected:  08/08/19 0317    Specimen:  Blood Updated:  08/08/19 0418     Osmolality 287 mOsm/kg     Potassium [[id number]]  (Normal) Collected:  08/08/19 0317    Specimen:  Blood Updated:  08/08/19 0347     Potassium 3.8 mmol/L     Sodium [[id number]]  (Normal) Collected:  08/08/19 0317    Specimen:  Blood Updated:  08/08/19 0347     Sodium 141 mmol/L     Osmolality, Serum [[id number]]  (Abnormal) Collected:  08/08/19 0016    Specimen:  Blood Updated:  08/08/19 0223     Osmolality 285 mOsm/kg     Potassium [[id number]]  (Normal) Collected:  08/08/19 0016    Specimen:  Blood Updated:  08/08/19 0046     Potassium 4.2 mmol/L      Comment: Specimen hemolyzed.  Results may be affected.       Sodium [[id number]]  (Normal)  Collected:  08/08/19 0016    Specimen:  Blood Updated:  08/08/19 0044     Sodium 139 mmol/L     POC Glucose Once [[id number]]  (Normal) Collected:  08/07/19 2339    Specimen:  Blood Updated:  08/08/19 0000     Glucose 106 mg/dL      Comment: : [id number] Samir Miller ID: [id number]       Osmolality, Serum [[id number]]  (Normal) Collected:  08/07/19 1629    Specimen:  Blood Updated:  08/07/19 1716     Osmolality 289 mOsm/kg     aPTT [[id number]]  (Normal) Collected:  08/07/19 1629    Specimen:  Blood Updated:  08/07/19 1653     PTT 31.6 seconds     Protime-INR [[id number]]  (Normal) Collected:  08/07/19 1629    Specimen:  Blood Updated:  08/07/19 1653     Protime 14.3 Seconds      INR 1.08    Comprehensive Metabolic Panel [[id number]]  (Abnormal) Collected:  08/07/19 1629    Specimen:  Blood Updated:  08/07/19 1652     Glucose 112 mg/dL      BUN 13 mg/dL      Creatinine 0.79 mg/dL      Sodium 139 mmol/L      Potassium 4.0 mmol/L      Chloride 103 mmol/L      CO2 29.0 mmol/L      Calcium 9.4 mg/dL      Total Protein 7.3 g/dL      Albumin 4.40 g/dL      ALT (SGPT) 23 U/L      AST (SGOT) 21 U/L      Alkaline Phosphatase 160 U/L      Total Bilirubin 1.3 mg/dL      eGFR Non African Amer 99 mL/min/1.73      Globulin 2.9 gm/dL      A/G Ratio 1.5 g/dL      BUN/Creatinine Ratio 16.5     Anion Gap 7.0 mmol/L     Narrative:       GFR Normal >60  Chronic Kidney Disease <60  Kidney Failure <15    Potassium [[id number]]  (Normal) Collected:  08/07/19 1629    Specimen:  Blood Updated:  08/07/19 1652     Potassium 4.0 mmol/L     CBC Auto Differential [[id number]]  (Abnormal) Collected:  08/07/19 1629    Specimen:  Blood Updated:  08/07/19 1649     WBC 6.48 10*3/mm3      RBC 4.56 10*6/mm3      Hemoglobin 13.5 g/dL      Hematocrit 41.0 %      MCV 89.9 fL      MCH 29.6 pg      MCHC 32.9 g/dL      RDW 15.0 %      RDW-SD 49.2 fl      MPV 10.0 fL      Platelets 131 10*3/mm3      Neutrophil % 83.3 %      Lymphocyte % 10.0 %       Monocyte % 5.9 %      Eosinophil % 0.3 %      Basophil % 0.2 %      Neutrophils, Absolute 5.40 10*3/mm3      Lymphocytes, Absolute 0.65 10*3/mm3      Monocytes, Absolute 0.38 10*3/mm3      Eosinophils, Absolute 0.02 10*3/mm3      Basophils, Absolute 0.01 10*3/mm3         [id number]  Sabino De León, APRN       Chest tube in place Central venous catheter in place

## 2022-08-23 NOTE — TELEPHONE ENCOUNTER
Called patient to schedule surgery. Left josh   What Type Of Note Output Would You Prefer (Optional)?: Bullet Format How Severe Is Your Acne?: moderate Is This A New Presentation, Or A Follow-Up?: Acne Additional Comments (Use Complete Sentences): Acne face many years. Treating at [facility] with multiple topicals. Has tried [facility] in the past. Currently using a combo of tret cream, clinda solution and gel and Gomez cream

## 2023-10-21 NOTE — DISCHARGE INSTRUCTIONS
PRIMARY DIAGNOSIS: GENERALIZED WEAKNESS    OUTPATIENT/OBSERVATION GOALS TO BE MET BEFORE DISCHARGE  1. Orthostatic performed: N/A    2. Tolerating PO medications: Yes    3. Return to near baseline physical activity: No    4. Cleared for discharge by consultants (if involved): No    Discharge Planner Nurse   Safe discharge environment identified: Yes  Barriers to discharge: Yes       Entered by: Tammy Iraheta RN 10/20/2023 11:16 PM     Please review provider order for any additional goals.   Nurse to notify provider when observation goals have been met and patient is ready for discharge.   [facility]    Dear Patient,  You recently were admitted to the hospital for nontraumatic intracranial hemorrhage and are now ready to go home. These written instructions are intended to help you to recover quickly.  • If you have ANY QUESTIONS about your condition prior to discharge please ask Dr. Littlejohn. In particular, if you have concerns about going home discuss them now. We do not want you to go until you are completely comfortable leaving the hospital.   • If you have ANY QUESTIONS about your condition after you go home call your doctor. The number is [phone] which is answered 24 hours a day. During regular working hours a  will connect you to your doctor, one of his partners, or one of our nurses. At night or on weekends the answering service will connect you with the physicianon call. DO NOT HESITATE to call. We want to help you with any problems.     Seizures  Anyone who has brain injury has a small risk of seizures. Seizures may involve involuntary shaking of the arms and legs, loss of consciousness, loss of urinary or bowel control. They typically last a few minutes, then the patient returns to normal. Seizures are frightening to watch, but usually resolve without long-term problems. Your doctor will often attempt to prevent seizures after surgery by putting you on anti-seizure medicine like Dilantin or Keppra. Sometimes seizures occur even when these medicines are used  What to do if a seizure occurs:  • If a seizure occurs and the patient does not return to normal in 10 minutes, call your Dr. Littlejohn or go to the local emergency room.   • If multiple seizures occur, call 911.     Neurological Deficit  Neurological deficits are problems with brain function like speech difficulty, weakness, numbness, imbalance, etc. These deficits may be present before or after brain injury. Prior to discharge Dr. Littlejohn will make sure that all treatment needed to help you recover  from such deficits has been instituted. He will also make sure that these deficits are stable or improving. After you go home, if you think any of your brain problems are getting worse, not better, it may be a sign of bleeding, infection, or other problems. Call Dr. Littlejohn. He will order tests and prescribe treatment as needed.    Deep vein thrombosis/ pulmonary embolus  Some patients who are admitted to the hospital develop blood clots in the veins of the legs. These are caused by decreased walking and laying in bed.  These clots can cause pain or swelling in the legs, or may cause no obvious problem. They can break free from the legs and travel to the lungs causing shortness of breath and/or chest pain. If you develop pain or swelling in your legs after surgery, call your doctor. If you develop breathing problems or chest pain after surgery, call 911.    Medication  It is important to take your medication EXACTLY as prescribed. Some patients are reluctant to take pain medication. It is perfectly fine to take pain medication for several weeks after injury. We want to eliminate pain whenever possible. Many pain medications can cause nausea (sick to your stomach), constipation (inability to poop), or itching. Nausea may be minimized by taking the medication with food. Constipation can be relieved by taking stool softeners and/ or laxatives that you can purchase over the counter as needed. Some medications, like steroids or seizure medications, should not be stopped abruptly. They need to be weaned off over time. For instructions on weaning schedules call your doctor. Sometimes patients develop an allergy to a medication that was started during hospitalization. Most frequently an allergy will cause an itchy rash. Call your doctor if you think you might be having an allergic reaction.    Hydrocephalus  Your brain manufactures about one quart of clear fluid (called cerebrospinal fluid or CSF) every day. Normally this  fluid is reabsorbed into the blood stream. After brain injury the flow of fluid and the reabsorption process may be impaired. This leads to a buildup of water on the brain that is called hydrocephalus. Hydrocephalus can cause headache, somnolence, difficulty thinking, imbalance and loss of urinary control. If you think that the patient may have hydrocephalus, call your doctor. She/He will order a brain scan. If it shows water buildup a simple operation called a shunt may be needed to fix the problem.    How to contact your doctor  The neurosurgeon, Dr. Littlejohn, and her/his team did your surgery and, therefore, are likely to know more about your condition than any other physicians. We are immediately available to help you with any problems after surgery. Please call us for any concerns at the following numbers:   • Doctor’s office [phone] (answered 24 hours a day)   • [facility]'s  [phone] (alternative emergency number for on-call neurosurgeon)     Specific instructions related to your surgery  Diet: no restrictions, eat a heart healthy diet.   Activity: as tolerated, no heavy lifting or strenuous exercise for at least 2 weeks.  ?  Keppra (levetiracetam): Keppra is an antiepileptic drug, also called an anticonvulsant. It is given to you because you either had a seizure or your condition makes you prone to have seizures. Do not stop this drug suddenly. You will need to be tapered off this medication. Report any new mood or behavior problems to your physician. These include depression, anxiety, agitation, irritability, hyperactivity, or suicidal thoughts.     Follow up:   Follow up with Sabino Littlejohn in the neurosurgery clinic ([phone]) in 6 weeks.  CT of the head prior to appointment.

## 2024-05-20 NOTE — THERAPY TREATMENT NOTE
Acute Care - Physical Therapy Treatment Note  [facility]     Patient Name: Michael Sorto  : 1955  MRN: [id number]  Today's Date: 1/3/2020  Onset of Illness/Injury or Date of Surgery: 19     Referring Physician: Dr. Lu    Admit Date: 2019    Visit Dx:    ICD-10-CM ICD-9-CM   1. Pyogenic arthritis of right knee joint, due to unspecified organism (CMS/[facility]) M00.9 711.06   2. Painful total knee replacement, initial encounter (CMS/[facility]) T84.84XA 996.77    Z96.659 V43.65     338.18   3. Impaired functional mobility, balance, gait, and endurance Z74.09 V49.89   4. Impaired mobility and ADLs Z74.09 799.89     Patient Active Problem List   Diagnosis   • Headache   • Nausea   • Nontraumatic intracerebral hemorrhage (CMS/[facility])   • Hypertension   • Morbid obesity (CMS/[facility])   • Cellulitis of left lower extremity   • Intracerebral hemorrhage (CMS/[facility])   • Chronic pain of right knee   • Painful total knee replacement, initial encounter (CMS/[facility])   • Pyogenic arthritis of right knee joint (CMS/[facility])   • Painful total knee replacement (CMS/[facility])       Therapy Treatment    Rehabilitation Treatment Summary     Row Name 20 1405 20 0901 20 0900       Treatment Time/Intention    Discipline  physical therapy assistant  -TW  occupational therapy assistant  -CS  physical therapy assistant  -TW    Document Type  therapy note (daily note)  -TW  therapy note (daily note)  -CS  therapy note (daily note)  -TW    Subjective Information  --  no complaints  -CS  no complaints  -TW    Mode of Treatment  physical therapy;individual therapy  -TW  occupational therapy;co-treatment  -CS  physical therapy;co-treatment;occupational therapy  -TW    Patient/Family Observations  Pt recieved another knee immobilizer and was agreeable to veena and attempt standing this pm.  -TW  --  Pt agreeable to tx at this time.  -TW    Therapy Frequency (OT Eval)  --  daily  -CS  --    Patient Effort  adequate  -TW  adequate   Vascular Access Team    Ultrasound-Guided PIV Insertion    A 20g X 1.75 inch peripheral IV was inserted into the MAGDALENO in one needle pass using ultrasound guidance. Insertion performed using aseptic non-touch technique. Brisk blood return noted and IV flushed easily with normal saline. Dressing dated and intact. The RENZO Mcdonnell RN was notified.    Labs drawn off start and sent to lab   -CS  adequate  -TW    Comment  After several attempts and adjustments were made, pt was able to satisfactorily obtain a good fit of R knee immobilizer to his liking.   -TW2  --  --    Existing Precautions/Restrictions  fall  -TW  fall  -CS  fall  -TW    Recorded by [TW] Abel Wheatley, PTA 01/03/20 1651  [TW2] Abel Wheatley, PTA 01/03/20 1701 [CS] Sondra Vega COTA/LIAM 01/03/20 1149 [TW] Abel Wheatley, PTA 01/03/20 1115    Row Name 01/03/20 1405 01/03/20 0901 01/03/20 0900       Vital Signs    Pre Systolic BP Rehab  148  -TW  140  -CS  140  -TW    Pre Treatment Diastolic BP  111 Nsg made aware and okayed pt to stand to allow for bed linen  -TW  78  -CS  78  -TW    Intra Systolic BP Rehab  -- to be changed.  -TW  --  --    Post Systolic BP Rehab  165  -TW  --  --    Post Treatment Diastolic BP  99  -TW  --  --    Pretreatment Heart Rate (beats/min)  70  -TW  72  -CS  72  -TW    Posttreatment Heart Rate (beats/min)  75  -TW  80  -CS  80  -TW    Pre SpO2 (%)  99  -TW  99  -CS  99  -TW    O2 Delivery Pre Treatment  room air  -TW  room air  -CS  room air  -TW    Post SpO2 (%)  98  -TW  98  -CS  98  -TW    O2 Delivery Post Treatment  --  room air  -CS  --    Pre Patient Position  Supine  -TW  Supine  -CS  Supine  -TW    Intra Patient Position  Standing  -TW  Supine  -CS  Supine  -TW    Post Patient Position  Supine  -TW  Supine  -CS  Supine  -TW    Recorded by [TW] Abel Wheatley, PTA 01/03/20 1701 [CS] Sondra Vega COTA/LIAM 01/03/20 1149 [TW] Abel Wheatley, PTA 01/03/20 1115    Row Name 01/03/20 1405 01/03/20 0901 01/03/20 0900       Cognitive Assessment/Intervention- PT/OT    Affect/Mental Status (Cognitive)  WFL  -TW  WFL  -CS  WFL  -TW    Orientation Status (Cognition)  oriented x 4  -TW  oriented x 4  -CS  oriented x 4  -TW    Follows Commands (Cognition)  WFL  -TW  WFL  -CS  WFL  -TW    Personal Safety Interventions  fall prevention program maintained;gait belt;nonskid  shoes/slippers when out of bed  -TW  --  muscle strengthening facilitated  -TW    Recorded by [TW] Abel Wheatley, PTA 01/03/20 1701 [CS] Sondra Vega COTA/LIAM 01/03/20 1149 [TW] Abel Wheatley, PTA 01/03/20 1115    Row Name 01/03/20 1405 01/03/20 0900          Safety Issues, Functional Mobility    Impairments Affecting Function (Mobility)  balance;coordination;endurance/activity tolerance;pain;range of motion (ROM);strength  -TW  balance;endurance/activity tolerance;pain;strength  -TW     Recorded by [TW] Abel Wheatley, PTA 01/03/20 1701 [TW] Abel Wheatley, PTA 01/03/20 1115     Row Name 01/03/20 1405 01/03/20 0901 01/03/20 0900       Mobility Assessment/Intervention    Right Lower Extremity (Weight-bearing Status)  (S) weight-bearing as tolerated (WBAT)  -TW  weight-bearing as tolerated (WBAT)  -CS  (S) weight-bearing as tolerated (WBAT)  -TW    Recorded by [TW] Abel Wheatley, PTA 01/03/20 1701 [CS] Sondra Vega COTA/LIAM 01/03/20 1149 [TW] Abel Wheatley, PTA 01/03/20 1115    Row Name 01/03/20 1405 01/03/20 0901 01/03/20 0900       Bed Mobility Assessment/Treatment    Rolling Left [place] (Bed Mobility)  conditional independence  -TW  conditional independence  -CS  conditional independence  -TW    Rolling Right [place] (Bed Mobility)  conditional independence  -TW  conditional independence  -CS  conditional independence  -TW    Scooting/Bridging [place] (Bed Mobility)  conditional independence  -TW  conditional independence  -CS  conditional independence  -TW    Supine-Sit [place] (Bed Mobility)  conditional independence  -TW  not tested  -CS  not tested  -TW    Sit-Supine [place] (Bed Mobility)  conditional independence  -TW  not tested  -CS  not tested  -TW    Bed Mobility, Safety Issues  --  --  decreased use of legs for bridging/pushing  -TW    Assistive Device (Bed Mobility)  --  bed rails  -CS  bed rails  -TW    Comment (Bed Mobility)  Pt sat  EOB and was able to veena L LE prosthetic with SBA but cont to require assistance to veena R LE shoe.   -TW  --  Pt able to demonstrate Cond ind bed mobility this tx.  -TW    Recorded by [TW] Abel Wheatley, PTA 01/03/20 1701 [CS] Sondra Vega, HAILE/L 01/03/20 1149 [TW] Abel Wheatley, PTA 01/03/20 1115    Row Name 01/03/20 1405 01/03/20 0900          Sit-Stand Transfer    Sit-Stand [place] (Transfers)  contact guard;minimum assist (75% patient effort)  -TW  not tested  -TW     Assistive Device (Sit-Stand Transfers)  (S) walker, front-wheeled with R knee immobilizer donned.  -TW  --     Recorded by [TW] Abel Wheatley, PTA 01/03/20 1701 [TW] Abel Wheatley, PTA 01/03/20 1115     Row Name 01/03/20 1405 01/03/20 0900          Stand-Sit Transfer    Stand-Sit [place] (Transfers)  contact guard;minimum assist (75% patient effort)  -TW  not tested  -TW     Assistive Device (Stand-Sit Transfers)  walker, front-wheeled  -TW  --     Recorded by [TW] Abel Wheatley, PTA 01/03/20 1701 [TW] Abel Wheatley, PTA 01/03/20 1115     Row Name 01/03/20 1405 01/03/20 0901 01/03/20 0900       Gait/Stairs Assessment/Training    Gait/Stairs Assessment/Training  gait/ambulation assistive device  -TW  --  --    [place] Level (Gait)  contact guard  -TW  --  --    Assistive Device (Gait)  walker, front-wheeled  -TW  --  --    Distance in Feet (Gait)  5ft x2 to w/c and back to bed.  -TW  --  --    Pattern (Gait)  step-to  -TW  --  --    Deviations/Abnormal Patterns (Gait)  antalgic;base of support, wide;stride length decreased  -TW  --  --    Bilateral Gait Deviations  forward flexed posture  -TW  --  --    Comment (Gait/Stairs)  Pt amb with R knee immobilizer donned.  -TW  Pt not able to perform due to new immobilizer not able to be applied due to girth limit of upper thigh strap not long enough on new brace. Pt needs 21-22 inch immobilizer to support thigh girth.   -CS  Pt not able to perform  due to new immobilizer not able to be applied due to girth limit of upper thigh strap not long enough on new brace. Pt needs 21-22 inch immobilizer to support thigh girth.   -TW    Recorded by [TW] Abel Wheatley PTA 01/03/20 1701 [CS] Sondra Vega COTA/LIAM 01/03/20 1149 [TW] Abel Wheatley, WADE 01/03/20 1115    Row Name 01/03/20 0900             Lower Extremity Supine Therapeutic Exercise    Performed, Supine Lower Extremity (Therapeutic Exercise)  gluteal sets;quadriceps sets;ankle pumps;heel slides;hip abduction/adduction;hip external/internal rotation  -TW      Exercise Type, Supine Lower Extremity (Therapeutic Exercise)  AROM (active range of motion)  -TW      Sets/Reps Detail, Supine Lower Extremity (Therapeutic Exercise)  1/15  -TW      Recorded by [TW] Abel Wheatley PTA 01/03/20 1115      Row Name 01/03/20 0901             Therapeutic Exercise    Upper Extremity (Therapeutic Exercise)  bicep curl, bilateral  -CS      Upper Extremity Range of Motion (Therapeutic Exercise)  shoulder flexion/extension, bilateral;shoulder internal/external rotation, bilateral;elbow flexion/extension, bilateral;forearm supination/pronation, bilateral;wrist flexion/extension, bilateral  -CS      Hand (Therapeutic Exercise)  finger flexion/extension, bilateral  -CS      Weight/Resistance (Therapeutic Exercise)  3 pounds  -CS      Exercise Type (Therapeutic Exercise)  AROM (active range of motion)  -CS      Position (Therapeutic Exercise)  other (see comments) long sitting  -CS      Sets/Reps (Therapeutic Exercise)  1/25  -CS      Equipment (Therapeutic Exercise)  free weight, barbell  -CS      Expected Outcome (Therapeutic Exercise)  improve functional tolerance, self-care activity;improve performance, transfer skills  -CS      Recorded by [CS] Sondra Vega COTA/L 01/03/20 1149      Row Name 01/03/20 1405 01/03/20 0901 01/03/20 0900       Positioning and Restraints    Pre-Treatment Position  in bed  -TW   in bed  -CS  in bed  -TW    Post Treatment Position  bed  -TW  bed  -CS  bed  -TW    In Bed  supine;call light within reach;encouraged to call for assist;exit alarm on  -TW  supine;call light within reach;encouraged to call for assist;exit alarm on;with nsg  -CS  supine;call light within reach;exit alarm on;encouraged to call for assist  -TW    Recorded by [TW] Abel Wheatley, WADE 01/03/20 1701 [CS] Sondra Vega COTA/LIAM 01/03/20 1149 [TW] Abel Wheatley, PTA 01/03/20 1115    Row Name 01/03/20 1405 01/03/20 0901 01/03/20 0900       Pain Scale: Numbers Pre/Post-Treatment    Pain Scale: Numbers, Pretreatment  0/10 - no pain  -TW  1/10  -CS  1/10  -TW    Pain Scale: Numbers, Post-Treatment  0/10 - no pain  -TW  1/10  -CS  1/10  -TW    Pain Location - Side  --  Right  -CS  Right  -TW    Pain Location  --  knee  -CS  knee  -TW    Recorded by [TW] Abel Wheatley, WADE 01/03/20 1701 [CS] Sondra Vega HAILE/LIAM 01/03/20 1149 [TW] Abel Wheatley, PTA 01/03/20 1115    Row Name                Wound 12/26/19 1246 Right anterior knee Incision    Wound - Properties Group Date first assessed: 12/26/19 [AQ] Time first assessed: 1246 [AQ] Side: Right [AQ] Orientation: anterior [AQ] Location: knee [AQ] Primary Wound Type: Incision [AQ] Recorded by:  [AQ] Nguyen Dugan RN 12/26/19 1246    Row Name 01/03/20 0901             Outcome Summary/Treatment Plan (OT)    Daily Summary of Progress (OT)  progress toward functional goals is good  -CS      Plan for Continued Treatment (OT)  cont OT POC  -CS      Anticipated Discharge Disposition (OT)  anticipate therapy at next level of care  -CS      Recorded by [CS] Sondra Vega COTA/L 01/03/20 1149      Row Name 01/03/20 1405 01/03/20 0900          Outcome Summary/Treatment Plan (PT)    Daily Summary of Progress (PT)  progress toward functional goals is good  -TW  progress toward functional goals is good  -TW     Barriers to Overall Progress (PT)  --  R knee  immobilizer not able to fit pt's upper thigh.  -TW     Plan for Continued Treatment (PT)  cont as pt able.  -TW  Cont to attempt tx as pt able.  -TW     Anticipated Discharge Disposition (PT)  anticipate therapy at next level of care  -TW  anticipate therapy at next level of care  -TW     Recorded by [TW] Abel Whetaley, PTA 01/03/20 1701 [TW] Abel Wheatley, PTA 01/03/20 1115       User Key  (r) = Recorded By, (t) = Taken By, (c) = Cosigned By    Initials Name Effective Dates Discipline    AQ Nguyen Dugan RN 10/17/16 -  Nurse    TW Abel Wheatley, PTA 03/07/18 -  PT    CS Sondra Vega COTA/LIAM 03/07/18 -  OT          Wound 12/26/19 1246 Right anterior knee Incision (Active)   Dressing Appearance moist drainage 1/3/2020  7:30 AM   Closure Staples 1/3/2020  7:30 AM   Base pink 1/3/2020  7:30 AM   Drainage Characteristics/Odor serosanguineous 1/3/2020  7:30 AM   Drainage Amount moderate 1/3/2020  7:30 AM   Care, Wound cleansed with;antimicrobial agent applied;dressing removed 1/3/2020  7:30 AM   Dressing Care, Wound dressing applied 1/3/2020  7:30 AM   Periwound Care, Wound cleansed with pH balanced cleanser 1/3/2020  7:30 AM       Rehab Goal Summary     Row Name 01/03/20 1405 01/03/20 0901 01/03/20 0900       Bed Mobility Goal 1 (PT)    Activity/Assistive Device (Bed Mobility Goal 1, PT)  bed mobility activities, all  -TW  --  bed mobility activities, all  -TW    [place] Level/Cues Needed (Bed Mobility Goal 1, PT)  independent;conditional independence  -TW  --  independent;conditional independence  -TW    Time Frame (Bed Mobility Goal 1, PT)  short term goal (STG);3 days  -TW  --  short term goal (STG);3 days  -TW    Progress/Outcomes (Bed Mobility Goal 1, PT)  (S) goal met  -TW  --  (S) goal met  -TW       Transfer Goal 1 (PT)    Activity/Assistive Device (Transfer Goal 1, PT)  bed-to-chair/chair-to-bed;toilet  -TW  --  bed-to-chair/chair-to-bed;toilet  -TW    [place] Level/Cues  Needed (Transfer Goal 1, PT)  conditional independence  -TW  --  conditional independence  -TW    Time Frame (Transfer Goal 1, PT)  long term goal (LTG);by discharge;10 days  -TW  --  long term goal (LTG);by discharge;10 days  -TW    Progress/Outcome (Transfer Goal 1, PT)  goal not met  -TW  --  goal not met  -TW       Gait Training Goal 1 (PT)    Activity/Assistive Device (Gait Training Goal 1, PT)  gait (walking locomotion);assistive device use;backward stepping;decrease fall risk;sidestepping;turning, left;turning, right;forward stepping;improve balance and speed;walker, rolling  -TW  --  gait (walking locomotion);assistive device use;backward stepping;decrease fall risk;sidestepping;turning, left;turning, right;forward stepping;improve balance and speed;walker, rolling  -TW    [place] Level (Gait Training Goal 1, PT)  conditional independence  -TW  --  conditional independence  -TW    Distance (Gait Goal 1, PT)  150 ft or more  -TW  --  150 ft or more  -TW    Time Frame (Gait Training Goal 1, PT)  by discharge;3 weeks;long term goal (LTG)  -TW  --  by discharge;3 weeks;long term goal (LTG)  -TW    Progress/Outcome (Gait Training Goal 1, PT)  goal not met  -TW  --  goal not met  -TW       ROM Goal 1 (PT)    ROM Goal 1 (PT)  R knee 0-95 deg AROM  -TW  --  R knee 0-95 deg AROM  -TW    Time Frame (ROM Goal 1, PT)  long term goal (LTG);5 - 7 days  -TW  --  long term goal (LTG);5 - 7 days  -TW    Progress/Outcome (ROM Goal 1, PT)  goal not met  -TW  --  goal not met  -TW       Stairs Goal 1 (PT)    Activity/Assistive Device (Stairs Goal 1, PT)  ascending stairs;descending stairs;assistive device use  -TW  --  ascending stairs;descending stairs;assistive device use  -TW    [place] Level/Cues Needed (Stairs Goal 1, PT)  conditional independence  -TW  --  conditional independence  -TW    Time Frame (Stairs Goal 1, PT)  by discharge;3 weeks  -TW  --  by discharge;3 weeks  -TW    Progress/Outcome (Stairs Goal 1,  PT)  goal not met  -TW  --  goal not met  -TW       Occupational Therapy Goals    Transfer Goal Selection (OT)  --  transfer, OT goal 1  -CS  --    Dressing Goal Selection (OT)  --  dressing, OT goal 1  -CS  --    Balance Goal Selection (OT)  --  balance, OT goal 1  -CS  --    Endurance Goal Selection (OT)  --  endurance, OT goal 1  -CS  --       Transfer Goal 1 (OT)    Activity/Assistive Device (Transfer Goal 1, OT)  --  toilet;commode, bedside with drop arms;commode, bedside without drop arms  -CS  --    [place] Level/Cues Needed (Transfer Goal 1, OT)  --  moderate assist (50-74% patient effort)  -CS  --    Time Frame (Transfer Goal 1, OT)  --  long term goal (LTG);by discharge  -CS  --    Progress/Outcome (Transfer Goal 1, OT)  --  goal not met  -CS  --       Dressing Goal 1 (OT)    Activity/Assistive Device (Dressing Goal 1, OT)  --  dressing skills, all  -CS  --    [place]/Cues Needed (Dressing Goal 1, OT)  --  minimum assist (75% or more patient effort)  -CS  --    Time Frame (Dressing Goal 1, OT)  --  long term goal (LTG);by discharge  -CS  --    Progress/Outcome (Dressing Goal 1, OT)  --  goal not met;good progress toward goal  -CS  --       Balance Goal 1 (OT)    Activity/Assistive Device (Balance Goal 1, OT)  --  sitting, dynamic 30 min no LOB and good safety  -CS  --    [place] Level/Cues Needed (Balance Goal 1, OT)  --  supervision required  -CS  --    Time Frame (Balance Goal 1, OT)  --  long term goal (LTG);by discharge  -CS  --    Progress/Outcomes (Balance Goal 1, OT)  --  goal met  -CS  --        Endurance Goal 1 (OT)    Activity Level (Endurance Goal 1, OT)  --  endurance 2 fair + 25 min functional task 3 or less rest breaks.   -CS  --    Time Frame (Endurance Goal 1, OT)  --  long term goal (LTG);by discharge  -CS  --    Progress/Outcome (Endurance Goal 1, OT)  --  goal met  -CS  --      User Key  (r) = Recorded By, (t) = Taken By, (c) = Cosigned By    Initials Name Provider Type  "Discipline    TW Abel Wheatley, PTA Physical Therapy Assistant PT    CS Sondra Vega, HAILE/LIAM Occupational Therapy Assistant OT              PT Recommendation and Plan  Anticipated Discharge Disposition (PT): anticipate therapy at next level of care  Outcome Summary/Treatment Plan (PT)  Daily Summary of Progress (PT): progress toward functional goals is good  Barriers to Overall Progress (PT): R knee immobilizer not able to fit pt's upper thigh.  Plan for Continued Treatment (PT): cont as pt able.  Anticipated Discharge Disposition (PT): anticipate therapy at next level of care  Plan of Care Reviewed With: patient  Progress: improving  Outcome Summary: PTA was able to obtain good fit of new R knee immobilizer after several adjustments were made and reapplied. Pt was then able to t/f to EOB and stood to amb to w/c to allow nsg to change out bed linens and pt stood from w/c with CGA/min of 1 and amb back to bed with RW and CGA/min of 1 for safety. Pt with lots of gait issues but when educated on proper tech pt declines to attempt corrections suggestions. Pt was addressed for short time periiod during tx from security on inappropriate behavior toward nsg staff. Pt was less cooperative after interaction with security and eventually told PTA to \"Get the hell out of here and yall just leave me alone.\" Pt would cont to benefit from therapy upon DC.  Outcome Measures     Row Name 01/03/20 1405 01/03/20 1100 01/03/20 0900       How much help from another person do you currently need...    Turning from your back to your side while in flat bed without using bedrails?  4  -TW  --  4  -TW    Moving from lying on back to sitting on the side of a flat bed without bedrails?  4  -TW  --  4  -TW    Moving to and from a bed to a chair (including a wheelchair)?  3  -TW  --  3  -TW    Standing up from a chair using your arms (e.g., wheelchair, bedside chair)?  3  -TW  --  3  -TW    Climbing 3-5 steps with a railing?  2  -TW  " --  2  -TW    To walk in hospital room?  3  -TW  --  3  -TW    AM-PAC 6 Clicks Score (PT)  19  -TW  --  19  -TW       How much help from another is currently needed...    Putting on and taking off regular lower body clothing?  --  1  -CS  --    Bathing (including washing, rinsing, and drying)  --  2  -CS  --    Toileting (which includes using toilet bed pan or urinal)  --  2  -CS  --    Putting on and taking off regular upper body clothing  --  3  -CS  --    Taking care of personal grooming (such as brushing teeth)  --  3  -CS  --    Eating meals  --  3  -CS  --    AM-PAC 6 Clicks Score (OT)  --  14  -CS  --       Functional Assessment    Outcome Measure Options  AM-PAC 6 Clicks Basic Mobility (PT)  -TW  --  AM-PAC 6 Clicks Basic Mobility (PT)  -TW    Row Name 01/02/20 1400 01/02/20 0950 01/01/20 1002       How much help from another person do you currently need...    Turning from your back to your side while in flat bed without using bedrails?  --  4  -TW  4  -TW    Moving from lying on back to sitting on the side of a flat bed without bedrails?  --  4  -TW  4  -TW    Moving to and from a bed to a chair (including a wheelchair)?  --  3  -TW  3  -TW    Standing up from a chair using your arms (e.g., wheelchair, bedside chair)?  --  3  -TW  3  -TW    Climbing 3-5 steps with a railing?  --  2  -TW  1  -TW    To walk in hospital room?  --  3  -TW  3  -TW    AM-PAC 6 Clicks Score (PT)  --  19  -TW  18  -TW       How much help from another is currently needed...    Putting on and taking off regular lower body clothing?  1  -CS  --  --    Bathing (including washing, rinsing, and drying)  2  -CS  --  --    Toileting (which includes using toilet bed pan or urinal)  2  -CS  --  --    Putting on and taking off regular upper body clothing  3  -CS  --  --    Taking care of personal grooming (such as brushing teeth)  3  -CS  --  --    Eating meals  3  -CS  --  --    AM-PAC 6 Clicks Score (OT)  14  -CS  --  --       Functional  Assessment    Outcome Measure Options  --  AM-PAC 6 Clicks Basic Mobility (PT)  -TW  AM-PAC 6 Clicks Basic Mobility (PT)  -TW    Row Name 01/01/20 0844 01/01/20 0843          How much help from another is currently needed...    Putting on and taking off regular lower body clothing?  1  -LITA  2  -TO     Bathing (including washing, rinsing, and drying)  2  -LITA  2  -TO     Toileting (which includes using toilet bed pan or urinal)  2  -LITA  2  -TO     Putting on and taking off regular upper body clothing  3  -LITA  3  -TO     Taking care of personal grooming (such as brushing teeth)  3  -LITA  3  -TO     Eating meals  3  -LITA  4  -TO     AM-PAC 6 Clicks Score (OT)  14  -LITA  16  -TO       User Key  (r) = Recorded By, (t) = Taken By, (c) = Cosigned By    Initials Name Provider Type    TW Abel Wheatley PTA Physical Therapy Assistant    TO Talon Cavanaugh COTA/L Occupational Therapy Assistant    Sondra Sweeney COTA/LIAM Occupational Therapy Assistant    Mikayla Orr OTA Occupational Therapy Assistant         Time Calculation:   PT Charges     Row Name 01/03/20 1707 01/03/20 1119          Time Calculation    Start Time  1405  -TW  0900  -TW     Stop Time  1500  -TW  0956  -TW     Time Calculation (min)  55 min  -TW  56 min  -TW     PT Non-Billable Time (min)  10 min  -TW  30 min  -TW     PT Received On  01/03/20  -TW  01/03/20  -TW     PT Goal Re-Cert Due Date  01/08/20  -TW  01/08/20  -TW        Time Calculation- PT    Total Timed Code Minutes- PT  45 minute(s)  -TW  26 minute(s)  -TW       User Key  (r) = Recorded By, (t) = Taken By, (c) = Cosigned By    Initials Name Provider Type    TW Abel Wheatley PTA Physical Therapy Assistant        Therapy Charges for Today     Code Description Service Date Service Provider Modifiers Qty    53560783159 HC GAIT TRAINING EA 15 MIN 1/2/2020 Abel Wheatley, WADE GP 1    93886927940 HC PT THERAPEUTIC ACT EA 15 MIN 1/2/2020 Abel Wheatley PTA GP 2     70262706532 HC PT THER SUPP EA 15 MIN 1/2/2020 Abel Wheatley, PTA GP 2    30574497412 HC PT THERAPEUTIC ACT EA 15 MIN 1/3/2020 Abel Wheatley, PTA GP 1    31409347223 HC PT THER PROC EA 15 MIN 1/3/2020 Abel Wheatley, PTA GP 1    96648440548 HC PT THER SUPP EA 15 MIN 1/3/2020 Abel Wheatley, PTA GP 2    80845679340 HC PT THER SUPP EA 15 MIN 1/3/2020 [employer] Wheatley, PTA GP 1    [id number] HC GAIT TRAINING EA 15 MIN 1/3/2020 Abel Wheatley, PTA GP 1    [id number] HC PT THERAPEUTIC ACT EA 15 MIN 1/3/2020 Abel Wheatley, PTA GP 2          PT G-Codes  Outcome Measure Options: AM-PAC 6 Clicks Basic Mobility (PT)  AM-PAC 6 Clicks Score (PT): 19  AM-PAC 6 Clicks Score (OT): 14    Abel Wheatley PTA  1/3/2020

## (undated) DEVICE — PAD,ABDOMINAL,8"X10",ST,LF: Brand: MEDLINE

## (undated) DEVICE — STERILE POLYISOPRENE POWDER-FREE SURGICAL GLOVES WITH EMOLLIENT COATING: Brand: PROTEXIS

## (undated) DEVICE — SOL IRR NACL 0.9PCT BT 1000ML

## (undated) DEVICE — UNDYED BRAIDED (POLYGLACTIN 910), SYNTHETIC ABSORBABLE SUTURE: Brand: COATED VICRYL

## (undated) DEVICE — SUT PDS 0 CT-1 Z340H

## (undated) DEVICE — TB CULTURETT2 LIQ STUARTS RED

## (undated) DEVICE — DISPOSABLE TOURNIQUET CUFF SINGLE BLADDER, DUAL PORT AND QUICK CONNECT CONNECTOR: Brand: COLOR CUFF

## (undated) DEVICE — NDL HYPO PRECISIONGLIDE/REG 18G 1IN PNK

## (undated) DEVICE — CATHETER,URETHRAL,REDRUBBER,STRL,16FR: Brand: MEDLINE

## (undated) DEVICE — IMMOB KN 3PNL DLX CANVS 22IN BLU

## (undated) DEVICE — UNDRPD BREATH 23X36 BG/10

## (undated) DEVICE — PEEL-AWAY TOGA, 2X LARGE: Brand: FLYTE

## (undated) DEVICE — STRYKER PERFORMANCE SERIES SAGITTAL BLADE: Brand: STRYKER PERFORMANCE SERIES

## (undated) DEVICE — GLV SURG ORTHO BIOGEL OPTIFIT PF SZ9

## (undated) DEVICE — BNDG ELAS CO-FLEX SLF ADHR 4IN5YD LF STRL

## (undated) DEVICE — DRSNG GZ CURAD XEROFORM NONADHS 5X9IN STRL

## (undated) DEVICE — TRAY,CATHETER,URETHRAL,RED-RUBBER,15FR: Brand: MEDLINE

## (undated) DEVICE — GLV SURG TRIUMPH LT PF LTX 8 STRL

## (undated) DEVICE — PK KN TOTL LF 60

## (undated) DEVICE — GLV SURG TRIUMPH ORTHO W/ALOE PF LTX 8 STRL

## (undated) DEVICE — SUT VIC 0 CT1 CR8 27IN JJ41G

## (undated) DEVICE — NDL HYPO SFTY GLD 22G 1 1/2IN

## (undated) DEVICE — SOL IRR NACL 0.9PCT 3000ML

## (undated) DEVICE — SOL ANTISEP SCRB PVPI 7.5PCT 4OZ

## (undated) DEVICE — KT DRN EVAC WND PVC PCH WTROC RND 10F400

## (undated) DEVICE — HOOD, PEEL-AWAY: Brand: FLYTE

## (undated) DEVICE — PROXIMATE PLUS MD MULTI-DIRECTIONAL RELEASE SKIN STAPLERS CONTAINS 35 STAINLESS STEEL STAPLES APPROXIMATE CLOSED DIMENSIONS: 6.9MM X 3.9MM WIDE: Brand: PROXIMATE

## (undated) DEVICE — GLV SURG SENSICARE POLYISPRN W/ALOE PF LF 6.5 GRN STRL

## (undated) DEVICE — GLV SURG SENSICARE PI PF LF 8.5 GRN STRL

## (undated) DEVICE — SYR LL TP 10ML STRL

## (undated) DEVICE — ANTIBACTERIAL UNDYED BRAIDED (POLYGLACTIN 910), SYNTHETIC ABSORBABLE SUTURE: Brand: COATED VICRYL

## (undated) DEVICE — 3 BONE CEMENT MIXER: Brand: MIXEVAC

## (undated) DEVICE — PREP SOL POVIDONE/IODINE BT 4OZ

## (undated) DEVICE — SUT VIC 0 CT1 36IN J946H

## (undated) DEVICE — GLV SURG SENSICARE PI PF LF 7 GRN STRL

## (undated) DEVICE — PAD UNDERCAST WYTEX 6IN 4YD LF STRL

## (undated) DEVICE — TRAY,FOLEY INSERTION,PVP,10ML SYRINGE: Brand: MEDLINE

## (undated) DEVICE — DRAPE,U/ SHT,SPLIT,PLAS,STERIL: Brand: MEDLINE

## (undated) DEVICE — MARKR SKIN W/RULR AND LBL

## (undated) DEVICE — GLV SURG TRIUMPH ORTHO W/ALOE PF LTX 7.5 STRL

## (undated) DEVICE — SPNG DRN AMD EXCILON 6PLY 4X4IN PK/2

## (undated) DEVICE — SUT PDS 2/0 SH 27IN Z317H

## (undated) DEVICE — GOWN,PREVENTION PLUS,XLONG/XLARGE,STRL: Brand: MEDLINE

## (undated) DEVICE — GOWN,AURORA,NOREINF,RAGLAN,XL,STERILE: Brand: MEDLINE

## (undated) DEVICE — SPNG GZ WOVN 4X4IN 12PLY 10/BX STRL

## (undated) DEVICE — DUAL CUT SAGITTAL BLADE

## (undated) DEVICE — DRSNG WND BORDR/ADHS NONADHR/GZ LF 4X14IN STRL

## (undated) DEVICE — SUT VIC 2/0 CT1 CR8 18IN J839D

## (undated) DEVICE — GLV SURG TRIUMPH LT PF LTX 6 STRL

## (undated) DEVICE — GLV SURG TRIUMPH LT PF LTX 6.5 STRL

## (undated) DEVICE — LIGHT HANDLE: Brand: DEVON

## (undated) DEVICE — HANDPIECE SET WITH COAXIAL HIGH FLOW TIP AND SUCTION TUBE: Brand: INTERPULSE

## (undated) DEVICE — 3M™ DURAPORE™ SURGICAL TAPE 1538-3, 3 INCH X 10 YARD (7,5CM X 9,1M), 4 ROLLS/BOX: Brand: 3M™ DURAPORE™

## (undated) DEVICE — STPLR SKIN VISISTAT WD 35CT

## (undated) DEVICE — 4.0MM OVAL CARBIDE BUR

## (undated) DEVICE — SHEET,DRAPE,53X77,STERILE: Brand: MEDLINE

## (undated) DEVICE — GLV SURG SENSICARE PI LF PF 7.5 GRN STRL

## (undated) DEVICE — SUT PDS CLOSURE CT1 1/0 27IN Z341H

## (undated) DEVICE — SPNG LAP 18X18IN LF STRL PK/5

## (undated) DEVICE — GAUZE,SPONGE,FLUFF,6"X6.75",STRL,5/TRAY: Brand: MEDLINE

## (undated) DEVICE — BNDG ELAS ELITE V/CLOSE 6IN 5YD LF STRL